# Patient Record
Sex: MALE | Race: WHITE | NOT HISPANIC OR LATINO | Employment: UNEMPLOYED | URBAN - METROPOLITAN AREA
[De-identification: names, ages, dates, MRNs, and addresses within clinical notes are randomized per-mention and may not be internally consistent; named-entity substitution may affect disease eponyms.]

---

## 2024-05-21 ENCOUNTER — APPOINTMENT (OUTPATIENT)
Dept: RADIOLOGY | Facility: MEDICAL CENTER | Age: 54
DRG: 219 | End: 2024-05-21
Attending: EMERGENCY MEDICINE
Payer: COMMERCIAL

## 2024-05-21 ENCOUNTER — APPOINTMENT (OUTPATIENT)
Dept: RADIOLOGY | Facility: MEDICAL CENTER | Age: 54
DRG: 219 | End: 2024-05-21
Attending: SURGERY
Payer: COMMERCIAL

## 2024-05-21 ENCOUNTER — HOSPITAL ENCOUNTER (INPATIENT)
Facility: MEDICAL CENTER | Age: 54
LOS: 5 days | DRG: 219 | End: 2024-05-26
Attending: EMERGENCY MEDICINE | Admitting: SURGERY
Payer: COMMERCIAL

## 2024-05-21 ENCOUNTER — ANESTHESIA EVENT (OUTPATIENT)
Dept: SURGERY | Facility: MEDICAL CENTER | Age: 54
DRG: 219 | End: 2024-05-21
Payer: COMMERCIAL

## 2024-05-21 ENCOUNTER — ANESTHESIA (OUTPATIENT)
Dept: SURGERY | Facility: MEDICAL CENTER | Age: 54
DRG: 219 | End: 2024-05-21
Payer: COMMERCIAL

## 2024-05-21 ENCOUNTER — APPOINTMENT (OUTPATIENT)
Dept: CARDIOLOGY | Facility: MEDICAL CENTER | Age: 54
DRG: 219 | End: 2024-05-21
Attending: SURGERY
Payer: COMMERCIAL

## 2024-05-21 DIAGNOSIS — E66.01 MORBID OBESITY DUE TO EXCESS CALORIES (HCC): ICD-10-CM

## 2024-05-21 DIAGNOSIS — I71.012 DISSECTION OF DESCENDING THORACIC AORTA (HCC): ICD-10-CM

## 2024-05-21 DIAGNOSIS — D50.8 IRON DEFICIENCY ANEMIA SECONDARY TO INADEQUATE DIETARY IRON INTAKE: ICD-10-CM

## 2024-05-21 DIAGNOSIS — I10 PRIMARY HYPERTENSION: ICD-10-CM

## 2024-05-21 DIAGNOSIS — E03.9 ACQUIRED HYPOTHYROIDISM: ICD-10-CM

## 2024-05-21 DIAGNOSIS — Z72.0 TOBACCO ABUSE: ICD-10-CM

## 2024-05-21 DIAGNOSIS — K59.01 SLOW TRANSIT CONSTIPATION: ICD-10-CM

## 2024-05-21 DIAGNOSIS — R12 HEART BURN: ICD-10-CM

## 2024-05-21 PROBLEM — J95.821 RESPIRATORY FAILURE FOLLOWING TRAUMA AND SURGERY (HCC): Status: ACTIVE | Noted: 2024-05-21

## 2024-05-21 PROBLEM — E87.5 HYPERKALEMIA: Status: ACTIVE | Noted: 2024-05-21

## 2024-05-21 PROBLEM — I71.00 AORTIC DISSECTION (HCC): Status: ACTIVE | Noted: 2024-05-21

## 2024-05-21 LAB
ABO + RH BLD: NORMAL
ABO GROUP BLD: NORMAL
ALBUMIN SERPL BCP-MCNC: 3.2 G/DL (ref 3.2–4.9)
ALBUMIN SERPL BCP-MCNC: 3.9 G/DL (ref 3.2–4.9)
ALBUMIN/GLOB SERPL: 1.1 G/DL
ALBUMIN/GLOB SERPL: 1.1 G/DL
ALP SERPL-CCNC: 70 U/L (ref 30–99)
ALP SERPL-CCNC: 82 U/L (ref 30–99)
ALT SERPL-CCNC: 48 U/L (ref 2–50)
ALT SERPL-CCNC: 60 U/L (ref 2–50)
ANION GAP SERPL CALC-SCNC: 11 MMOL/L (ref 7–16)
ANION GAP SERPL CALC-SCNC: 11 MMOL/L (ref 7–16)
ANION GAP SERPL CALC-SCNC: 16 MMOL/L (ref 7–16)
APTT PPP: 30.6 SEC (ref 24.7–36)
AST SERPL-CCNC: 43 U/L (ref 12–45)
AST SERPL-CCNC: 56 U/L (ref 12–45)
BASE EXCESS BLDA CALC-SCNC: -1 MMOL/L (ref -4–3)
BASE EXCESS BLDA CALC-SCNC: -2 MMOL/L (ref -4–3)
BASOPHILS # BLD AUTO: 0.8 % (ref 0–1.8)
BASOPHILS # BLD: 0.12 K/UL (ref 0–0.12)
BILIRUB SERPL-MCNC: 1.4 MG/DL (ref 0.1–1.5)
BILIRUB SERPL-MCNC: 1.4 MG/DL (ref 0.1–1.5)
BLD GP AB SCN SERPL QL: NORMAL
BODY TEMPERATURE: ABNORMAL DEGREES
BODY TEMPERATURE: ABNORMAL DEGREES
BREATHS SETTING VENT: 20
BREATHS SETTING VENT: 26
BUN SERPL-MCNC: 22 MG/DL (ref 8–22)
BUN SERPL-MCNC: 25 MG/DL (ref 8–22)
BUN SERPL-MCNC: 28 MG/DL (ref 8–22)
CALCIUM ALBUM COR SERPL-MCNC: 8.3 MG/DL (ref 8.5–10.5)
CALCIUM ALBUM COR SERPL-MCNC: 9.2 MG/DL (ref 8.5–10.5)
CALCIUM SERPL-MCNC: 7.7 MG/DL (ref 8.5–10.5)
CALCIUM SERPL-MCNC: 8.1 MG/DL (ref 8.5–10.5)
CALCIUM SERPL-MCNC: 9.1 MG/DL (ref 8.5–10.5)
CFT BLD TEG: 4.7 MIN (ref 4.6–9.1)
CFT P HPASE BLD TEG: 4.9 MIN (ref 4.3–8.3)
CHLORIDE SERPL-SCNC: 100 MMOL/L (ref 96–112)
CHLORIDE SERPL-SCNC: 101 MMOL/L (ref 96–112)
CHLORIDE SERPL-SCNC: 103 MMOL/L (ref 96–112)
CLOT ANGLE BLD TEG: 67.2 DEGREES (ref 63–78)
CO2 BLDA-SCNC: 25 MMOL/L (ref 20–33)
CO2 BLDA-SCNC: 29 MMOL/L (ref 20–33)
CO2 SERPL-SCNC: 19 MMOL/L (ref 20–33)
CO2 SERPL-SCNC: 20 MMOL/L (ref 20–33)
CO2 SERPL-SCNC: 20 MMOL/L (ref 20–33)
CREAT SERPL-MCNC: 1.16 MG/DL (ref 0.5–1.4)
CREAT SERPL-MCNC: 1.67 MG/DL (ref 0.5–1.4)
CREAT SERPL-MCNC: 1.74 MG/DL (ref 0.5–1.4)
CT.EXTRINSIC BLD ROTEM: 2.2 MIN (ref 0.8–2.1)
DELSYS IDSYS: ABNORMAL
DELSYS IDSYS: ABNORMAL
EKG IMPRESSION: NORMAL
END TIDAL CARBON DIOXIDE IECO2: 33 MMHG
END TIDAL CARBON DIOXIDE IECO2: 41 MMHG
EOSINOPHIL # BLD AUTO: 0.08 K/UL (ref 0–0.51)
EOSINOPHIL NFR BLD: 0.5 % (ref 0–6.9)
ERYTHROCYTE [DISTWIDTH] IN BLOOD BY AUTOMATED COUNT: 51.2 FL (ref 35.9–50)
ERYTHROCYTE [DISTWIDTH] IN BLOOD BY AUTOMATED COUNT: 51.7 FL (ref 35.9–50)
GFR SERPLBLD CREATININE-BSD FMLA CKD-EPI: 46 ML/MIN/1.73 M 2
GFR SERPLBLD CREATININE-BSD FMLA CKD-EPI: 49 ML/MIN/1.73 M 2
GFR SERPLBLD CREATININE-BSD FMLA CKD-EPI: 75 ML/MIN/1.73 M 2
GLOBULIN SER CALC-MCNC: 2.9 G/DL (ref 1.9–3.5)
GLOBULIN SER CALC-MCNC: 3.5 G/DL (ref 1.9–3.5)
GLUCOSE SERPL-MCNC: 108 MG/DL (ref 65–99)
GLUCOSE SERPL-MCNC: 120 MG/DL (ref 65–99)
GLUCOSE SERPL-MCNC: 130 MG/DL (ref 65–99)
HCO3 BLDA-SCNC: 24.1 MMOL/L (ref 17–25)
HCO3 BLDA-SCNC: 27.4 MMOL/L (ref 17–25)
HCT VFR BLD AUTO: 48 % (ref 42–52)
HCT VFR BLD AUTO: 53.7 % (ref 42–52)
HGB BLD-MCNC: 16.2 G/DL (ref 14–18)
HGB BLD-MCNC: 18.5 G/DL (ref 14–18)
HOROWITZ INDEX BLDA+IHG-RTO: 193 MM[HG]
HOROWITZ INDEX BLDA+IHG-RTO: 296 MM[HG]
IMM GRANULOCYTES # BLD AUTO: 0.1 K/UL (ref 0–0.11)
IMM GRANULOCYTES NFR BLD AUTO: 0.7 % (ref 0–0.9)
INR PPP: 1.18 (ref 0.87–1.13)
LIPASE SERPL-CCNC: 36 U/L (ref 11–82)
LYMPHOCYTES # BLD AUTO: 1.11 K/UL (ref 1–4.8)
LYMPHOCYTES NFR BLD: 7.3 % (ref 22–41)
MCF BLD TEG: 49.8 MM (ref 52–69)
MCF.PLATELET INHIB BLD ROTEM: 15 MM (ref 15–32)
MCH RBC QN AUTO: 33 PG (ref 27–33)
MCH RBC QN AUTO: 33.5 PG (ref 27–33)
MCHC RBC AUTO-ENTMCNC: 33.8 G/DL (ref 32.3–36.5)
MCHC RBC AUTO-ENTMCNC: 34.5 G/DL (ref 32.3–36.5)
MCV RBC AUTO: 97.3 FL (ref 81.4–97.8)
MCV RBC AUTO: 97.8 FL (ref 81.4–97.8)
MODE IMODE: ABNORMAL
MODE IMODE: ABNORMAL
MONOCYTES # BLD AUTO: 1.1 K/UL (ref 0–0.85)
MONOCYTES NFR BLD AUTO: 7.2 % (ref 0–13.4)
NEUTROPHILS # BLD AUTO: 12.74 K/UL (ref 1.82–7.42)
NEUTROPHILS NFR BLD: 83.5 % (ref 44–72)
NRBC # BLD AUTO: 0 K/UL
NRBC BLD-RTO: 0 /100 WBC (ref 0–0.2)
O2/TOTAL GAS SETTING VFR VENT: 100 %
O2/TOTAL GAS SETTING VFR VENT: 40 %
PA AA BLD-ACNC: 72.7 % (ref 0–11)
PA ADP BLD-ACNC: 37.1 % (ref 0–17)
PCO2 BLDA: 41.3 MMHG (ref 26–37)
PCO2 BLDA: 66.3 MMHG (ref 26–37)
PCO2 TEMP ADJ BLDA: 40.7 MMHG (ref 26–37)
PCO2 TEMP ADJ BLDA: 61.3 MMHG (ref 26–37)
PEEP END EXPIRATORY PRESSURE IPEEP: 8 CMH20
PEEP END EXPIRATORY PRESSURE IPEEP: 8 CMH20
PH BLDA: 7.22 [PH] (ref 7.4–7.5)
PH BLDA: 7.38 [PH] (ref 7.4–7.5)
PH TEMP ADJ BLDA: 7.25 [PH] (ref 7.4–7.5)
PH TEMP ADJ BLDA: 7.38 [PH] (ref 7.4–7.5)
PLATELET # BLD AUTO: 143 K/UL (ref 164–446)
PLATELET # BLD AUTO: 170 K/UL (ref 164–446)
PMV BLD AUTO: 10 FL (ref 9–12.9)
PMV BLD AUTO: 10.4 FL (ref 9–12.9)
PO2 BLDA: 296 MMHG (ref 64–87)
PO2 BLDA: 77 MMHG (ref 64–87)
PO2 TEMP ADJ BLDA: 287 MMHG (ref 64–87)
PO2 TEMP ADJ BLDA: 75 MMHG (ref 64–87)
POTASSIUM SERPL-SCNC: 4.9 MMOL/L (ref 3.6–5.5)
POTASSIUM SERPL-SCNC: 4.9 MMOL/L (ref 3.6–5.5)
POTASSIUM SERPL-SCNC: 6.3 MMOL/L (ref 3.6–5.5)
PROT SERPL-MCNC: 6.1 G/DL (ref 6–8.2)
PROT SERPL-MCNC: 7.4 G/DL (ref 6–8.2)
PROTHROMBIN TIME: 15.1 SEC (ref 12–14.6)
RBC # BLD AUTO: 4.91 M/UL (ref 4.7–6.1)
RBC # BLD AUTO: 5.52 M/UL (ref 4.7–6.1)
RH BLD: NORMAL
SAO2 % BLDA: 100 % (ref 93–99)
SAO2 % BLDA: 95 % (ref 93–99)
SODIUM SERPL-SCNC: 132 MMOL/L (ref 135–145)
SODIUM SERPL-SCNC: 133 MMOL/L (ref 135–145)
SODIUM SERPL-SCNC: 136 MMOL/L (ref 135–145)
SPECIMEN DRAWN FROM PATIENT: ABNORMAL
SPECIMEN DRAWN FROM PATIENT: ABNORMAL
TEG ALGORITHM TGALG: ABNORMAL
TIDAL VOLUME IVT: 440 ML
TIDAL VOLUME IVT: 440 ML
TRIGL SERPL-MCNC: 73 MG/DL (ref 0–149)
TROPONIN T SERPL-MCNC: 18 NG/L (ref 6–19)
WBC # BLD AUTO: 11.1 K/UL (ref 4.8–10.8)
WBC # BLD AUTO: 15.3 K/UL (ref 4.8–10.8)

## 2024-05-21 PROCEDURE — 700105 HCHG RX REV CODE 258: Performed by: SURGERY

## 2024-05-21 PROCEDURE — 770022 HCHG ROOM/CARE - ICU (200)

## 2024-05-21 PROCEDURE — 94799 UNLISTED PULMONARY SVC/PX: CPT

## 2024-05-21 PROCEDURE — 94003 VENT MGMT INPAT SUBQ DAY: CPT

## 2024-05-21 PROCEDURE — 35661 BPG FEMORAL-FEMORAL: CPT | Mod: 50 | Performed by: SURGERY

## 2024-05-21 PROCEDURE — C1889 IMPLANT/INSERT DEVICE, NOC: HCPCS | Performed by: SURGERY

## 2024-05-21 PROCEDURE — 700111 HCHG RX REV CODE 636 W/ 250 OVERRIDE (IP): Performed by: SURGERY

## 2024-05-21 PROCEDURE — 85027 COMPLETE CBC AUTOMATED: CPT

## 2024-05-21 PROCEDURE — 75630 X-RAY AORTA LEG ARTERIES: CPT | Mod: 26,AS,59 | Performed by: NURSE PRACTITIONER

## 2024-05-21 PROCEDURE — 110454 HCHG SHELL REV 250: Performed by: SURGERY

## 2024-05-21 PROCEDURE — 33880 EVASC RPR TA NDGFT COV LSA: CPT | Performed by: SURGERY

## 2024-05-21 PROCEDURE — 02VW3DZ RESTRICTION OF THORACIC AORTA, DESCENDING WITH INTRALUMINAL DEVICE, PERCUTANEOUS APPROACH: ICD-10-PCS | Performed by: SURGERY

## 2024-05-21 PROCEDURE — C1725 CATH, TRANSLUMIN NON-LASER: HCPCS | Performed by: SURGERY

## 2024-05-21 PROCEDURE — C1894 INTRO/SHEATH, NON-LASER: HCPCS | Performed by: SURGERY

## 2024-05-21 PROCEDURE — 85384 FIBRINOGEN ACTIVITY: CPT | Mod: 91

## 2024-05-21 PROCEDURE — 85025 COMPLETE CBC W/AUTO DIFF WBC: CPT

## 2024-05-21 PROCEDURE — 73701 CT LOWER EXTREMITY W/DYE: CPT

## 2024-05-21 PROCEDURE — 160031 HCHG SURGERY MINUTES - 1ST 30 MINS LEVEL 5: Performed by: SURGERY

## 2024-05-21 PROCEDURE — 85576 BLOOD PLATELET AGGREGATION: CPT | Mod: 91

## 2024-05-21 PROCEDURE — 75630 X-RAY AORTA LEG ARTERIES: CPT | Mod: 26,59 | Performed by: SURGERY

## 2024-05-21 PROCEDURE — 700101 HCHG RX REV CODE 250: Performed by: EMERGENCY MEDICINE

## 2024-05-21 PROCEDURE — 85347 COAGULATION TIME ACTIVATED: CPT

## 2024-05-21 PROCEDURE — 700101 HCHG RX REV CODE 250: Performed by: SURGERY

## 2024-05-21 PROCEDURE — 80053 COMPREHEN METABOLIC PANEL: CPT

## 2024-05-21 PROCEDURE — 37221 PR REVASCULARIZE ILIAC ARTERY,ANGIOPLASTY/ST*: CPT | Mod: AS,50 | Performed by: NURSE PRACTITIONER

## 2024-05-21 PROCEDURE — 96375 TX/PRO/DX INJ NEW DRUG ADDON: CPT

## 2024-05-21 PROCEDURE — 85730 THROMBOPLASTIN TIME PARTIAL: CPT

## 2024-05-21 PROCEDURE — 96366 THER/PROPH/DIAG IV INF ADDON: CPT

## 2024-05-21 PROCEDURE — C1769 GUIDE WIRE: HCPCS | Performed by: SURGERY

## 2024-05-21 PROCEDURE — 86900 BLOOD TYPING SEROLOGIC ABO: CPT

## 2024-05-21 PROCEDURE — C1768 GRAFT, VASCULAR: HCPCS | Performed by: SURGERY

## 2024-05-21 PROCEDURE — 99222 1ST HOSP IP/OBS MODERATE 55: CPT | Mod: 57 | Performed by: SURGERY

## 2024-05-21 PROCEDURE — 99291 CRITICAL CARE FIRST HOUR: CPT | Performed by: SURGERY

## 2024-05-21 PROCEDURE — 160048 HCHG OR STATISTICAL LEVEL 1-5: Performed by: SURGERY

## 2024-05-21 PROCEDURE — B24BZZ4 ULTRASONOGRAPHY OF HEART WITH AORTA, TRANSESOPHAGEAL: ICD-10-PCS | Performed by: SURGERY

## 2024-05-21 PROCEDURE — 94002 VENT MGMT INPAT INIT DAY: CPT

## 2024-05-21 PROCEDURE — 160042 HCHG SURGERY MINUTES - EA ADDL 1 MIN LEVEL 5: Performed by: SURGERY

## 2024-05-21 PROCEDURE — 93005 ELECTROCARDIOGRAM TRACING: CPT | Performed by: EMERGENCY MEDICINE

## 2024-05-21 PROCEDURE — 82803 BLOOD GASES ANY COMBINATION: CPT

## 2024-05-21 PROCEDURE — 041L0JH BYPASS LEFT FEMORAL ARTERY TO RIGHT FEMORAL ARTERY WITH SYNTHETIC SUBSTITUTE, OPEN APPROACH: ICD-10-PCS | Performed by: SURGERY

## 2024-05-21 PROCEDURE — 36415 COLL VENOUS BLD VENIPUNCTURE: CPT

## 2024-05-21 PROCEDURE — 85610 PROTHROMBIN TIME: CPT

## 2024-05-21 PROCEDURE — 84478 ASSAY OF TRIGLYCERIDES: CPT

## 2024-05-21 PROCEDURE — 700105 HCHG RX REV CODE 258: Performed by: EMERGENCY MEDICINE

## 2024-05-21 PROCEDURE — 71275 CT ANGIOGRAPHY CHEST: CPT

## 2024-05-21 PROCEDURE — 80048 BASIC METABOLIC PNL TOTAL CA: CPT

## 2024-05-21 PROCEDURE — C1876 STENT, NON-COA/NON-COV W/DEL: HCPCS | Performed by: SURGERY

## 2024-05-21 PROCEDURE — 37221 PR REVASCULARIZE ILIAC ARTERY,ANGIOPLASTY/ST*: CPT | Mod: 50 | Performed by: SURGERY

## 2024-05-21 PROCEDURE — C1751 CATH, INF, PER/CENT/MIDLINE: HCPCS | Performed by: SURGERY

## 2024-05-21 PROCEDURE — 83690 ASSAY OF LIPASE: CPT

## 2024-05-21 PROCEDURE — 160009 HCHG ANES TIME/MIN: Performed by: SURGERY

## 2024-05-21 PROCEDURE — 75635 CT ANGIO ABDOMINAL ARTERIES: CPT

## 2024-05-21 PROCEDURE — 99291 CRITICAL CARE FIRST HOUR: CPT

## 2024-05-21 PROCEDURE — 700117 HCHG RX CONTRAST REV CODE 255: Performed by: EMERGENCY MEDICINE

## 2024-05-21 PROCEDURE — A9270 NON-COVERED ITEM OR SERVICE: HCPCS | Performed by: SURGERY

## 2024-05-21 PROCEDURE — 700101 HCHG RX REV CODE 250: Performed by: NURSE PRACTITIONER

## 2024-05-21 PROCEDURE — 35661 BPG FEMORAL-FEMORAL: CPT | Mod: AS,50 | Performed by: NURSE PRACTITIONER

## 2024-05-21 PROCEDURE — C1887 CATHETER, GUIDING: HCPCS | Performed by: SURGERY

## 2024-05-21 PROCEDURE — 96376 TX/PRO/DX INJ SAME DRUG ADON: CPT

## 2024-05-21 PROCEDURE — 37799 UNLISTED PX VASCULAR SURGERY: CPT

## 2024-05-21 PROCEDURE — 33880 EVASC RPR TA NDGFT COV LSA: CPT | Mod: AS | Performed by: NURSE PRACTITIONER

## 2024-05-21 PROCEDURE — 047H3DZ DILATION OF RIGHT EXTERNAL ILIAC ARTERY WITH INTRALUMINAL DEVICE, PERCUTANEOUS APPROACH: ICD-10-PCS | Performed by: SURGERY

## 2024-05-21 PROCEDURE — 84484 ASSAY OF TROPONIN QUANT: CPT

## 2024-05-21 PROCEDURE — 700111 HCHG RX REV CODE 636 W/ 250 OVERRIDE (IP): Performed by: EMERGENCY MEDICINE

## 2024-05-21 PROCEDURE — 93325 DOPPLER ECHO COLOR FLOW MAPG: CPT

## 2024-05-21 PROCEDURE — 700117 HCHG RX CONTRAST REV CODE 255: Performed by: SURGERY

## 2024-05-21 PROCEDURE — 96365 THER/PROPH/DIAG IV INF INIT: CPT

## 2024-05-21 PROCEDURE — 86850 RBC ANTIBODY SCREEN: CPT

## 2024-05-21 PROCEDURE — 700102 HCHG RX REV CODE 250 W/ 637 OVERRIDE(OP): Performed by: SURGERY

## 2024-05-21 PROCEDURE — 71045 X-RAY EXAM CHEST 1 VIEW: CPT

## 2024-05-21 PROCEDURE — 047D3DZ DILATION OF LEFT COMMON ILIAC ARTERY WITH INTRALUMINAL DEVICE, PERCUTANEOUS APPROACH: ICD-10-PCS | Performed by: SURGERY

## 2024-05-21 PROCEDURE — 304538 HCHG NG TUBE

## 2024-05-21 PROCEDURE — 94640 AIRWAY INHALATION TREATMENT: CPT

## 2024-05-21 PROCEDURE — 86901 BLOOD TYPING SEROLOGIC RH(D): CPT

## 2024-05-21 DEVICE — IMPLANTABLE DEVICE: Type: IMPLANTABLE DEVICE | Site: GROIN | Status: FUNCTIONAL

## 2024-05-21 DEVICE — GRAFT HEMA BIFUR MDV 14X7: Type: IMPLANTABLE DEVICE | Site: GROIN | Status: FUNCTIONAL

## 2024-05-21 RX ORDER — SODIUM CHLORIDE, SODIUM GLUCONATE, SODIUM ACETATE, POTASSIUM CHLORIDE AND MAGNESIUM CHLORIDE 526; 502; 368; 37; 30 MG/100ML; MG/100ML; MG/100ML; MG/100ML; MG/100ML
INJECTION, SOLUTION INTRAVENOUS
Status: DISCONTINUED | OUTPATIENT
Start: 2024-05-21 | End: 2024-05-21 | Stop reason: SURG

## 2024-05-21 RX ORDER — OXYCODONE HCL 5 MG/5 ML
10 SOLUTION, ORAL ORAL
Status: DISCONTINUED | OUTPATIENT
Start: 2024-05-21 | End: 2024-05-21

## 2024-05-21 RX ORDER — OXYCODONE HYDROCHLORIDE 5 MG/1
5 TABLET ORAL
Status: DISCONTINUED | OUTPATIENT
Start: 2024-05-21 | End: 2024-05-25

## 2024-05-21 RX ORDER — HYDRALAZINE HYDROCHLORIDE 20 MG/ML
10 INJECTION INTRAMUSCULAR; INTRAVENOUS EVERY 6 HOURS PRN
Status: DISCONTINUED | OUTPATIENT
Start: 2024-05-21 | End: 2024-05-23

## 2024-05-21 RX ORDER — SCOLOPAMINE TRANSDERMAL SYSTEM 1 MG/1
1 PATCH, EXTENDED RELEASE TRANSDERMAL
Status: DISCONTINUED | OUTPATIENT
Start: 2024-05-21 | End: 2024-05-24

## 2024-05-21 RX ORDER — HYDRALAZINE HYDROCHLORIDE 20 MG/ML
10 INJECTION INTRAMUSCULAR; INTRAVENOUS
Status: DISCONTINUED | OUTPATIENT
Start: 2024-05-21 | End: 2024-05-21

## 2024-05-21 RX ORDER — ONDANSETRON 2 MG/ML
4 INJECTION INTRAMUSCULAR; INTRAVENOUS
Status: DISCONTINUED | OUTPATIENT
Start: 2024-05-21 | End: 2024-05-21

## 2024-05-21 RX ORDER — DEXAMETHASONE SODIUM PHOSPHATE 4 MG/ML
4 INJECTION, SOLUTION INTRA-ARTICULAR; INTRALESIONAL; INTRAMUSCULAR; INTRAVENOUS; SOFT TISSUE
Status: DISCONTINUED | OUTPATIENT
Start: 2024-05-21 | End: 2024-05-24

## 2024-05-21 RX ORDER — ONDANSETRON 2 MG/ML
4 INJECTION INTRAMUSCULAR; INTRAVENOUS EVERY 4 HOURS PRN
Status: DISCONTINUED | OUTPATIENT
Start: 2024-05-21 | End: 2024-05-26 | Stop reason: HOSPADM

## 2024-05-21 RX ORDER — LABETALOL HYDROCHLORIDE 5 MG/ML
10 INJECTION, SOLUTION INTRAVENOUS EVERY 6 HOURS PRN
Status: DISCONTINUED | OUTPATIENT
Start: 2024-05-21 | End: 2024-05-22

## 2024-05-21 RX ORDER — HALOPERIDOL 5 MG/ML
1 INJECTION INTRAMUSCULAR EVERY 6 HOURS PRN
Status: DISCONTINUED | OUTPATIENT
Start: 2024-05-21 | End: 2024-05-24

## 2024-05-21 RX ORDER — OXYCODONE HCL 5 MG/5 ML
5 SOLUTION, ORAL ORAL
Status: DISCONTINUED | OUTPATIENT
Start: 2024-05-21 | End: 2024-05-21

## 2024-05-21 RX ORDER — HYDRALAZINE HYDROCHLORIDE 20 MG/ML
20 INJECTION INTRAMUSCULAR; INTRAVENOUS EVERY 6 HOURS PRN
Status: DISCONTINUED | OUTPATIENT
Start: 2024-05-21 | End: 2024-05-21

## 2024-05-21 RX ORDER — OXYCODONE HYDROCHLORIDE 5 MG/1
5 TABLET ORAL
Status: DISCONTINUED | OUTPATIENT
Start: 2024-05-21 | End: 2024-05-21

## 2024-05-21 RX ORDER — FAMOTIDINE 20 MG/1
20 TABLET, FILM COATED ORAL 2 TIMES DAILY
Status: DISCONTINUED | OUTPATIENT
Start: 2024-05-21 | End: 2024-05-22

## 2024-05-21 RX ORDER — DEXTROSE MONOHYDRATE, SODIUM CHLORIDE, AND POTASSIUM CHLORIDE 50; 1.49; 4.5 G/1000ML; G/1000ML; G/1000ML
INJECTION, SOLUTION INTRAVENOUS CONTINUOUS
Status: DISPENSED | OUTPATIENT
Start: 2024-05-21 | End: 2024-05-21

## 2024-05-21 RX ORDER — CEFAZOLIN SODIUM 1 G/3ML
INJECTION, POWDER, FOR SOLUTION INTRAMUSCULAR; INTRAVENOUS PRN
Status: DISCONTINUED | OUTPATIENT
Start: 2024-05-21 | End: 2024-05-21 | Stop reason: SURG

## 2024-05-21 RX ORDER — HYDROMORPHONE HYDROCHLORIDE 1 MG/ML
0.1 INJECTION, SOLUTION INTRAMUSCULAR; INTRAVENOUS; SUBCUTANEOUS
Status: DISCONTINUED | OUTPATIENT
Start: 2024-05-21 | End: 2024-05-21

## 2024-05-21 RX ORDER — ESMOLOL HYDROCHLORIDE 20 MG/ML
0-200 INJECTION, SOLUTION INTRAVENOUS CONTINUOUS
Status: DISCONTINUED | OUTPATIENT
Start: 2024-05-21 | End: 2024-05-21

## 2024-05-21 RX ORDER — AMLODIPINE BESYLATE 10 MG/1
10 TABLET ORAL
Status: DISCONTINUED | OUTPATIENT
Start: 2024-05-21 | End: 2024-05-25

## 2024-05-21 RX ORDER — PROTAMINE SULFATE 10 MG/ML
INJECTION, SOLUTION INTRAVENOUS PRN
Status: DISCONTINUED | OUTPATIENT
Start: 2024-05-21 | End: 2024-05-21 | Stop reason: SURG

## 2024-05-21 RX ORDER — NICOTINE 21 MG/24HR
21 PATCH, TRANSDERMAL 24 HOURS TRANSDERMAL
Status: DISCONTINUED | OUTPATIENT
Start: 2024-05-21 | End: 2024-05-26 | Stop reason: HOSPADM

## 2024-05-21 RX ORDER — DEXTROSE MONOHYDRATE 25 G/50ML
25 INJECTION, SOLUTION INTRAVENOUS ONCE
Status: COMPLETED | OUTPATIENT
Start: 2024-05-21 | End: 2024-05-21

## 2024-05-21 RX ORDER — SODIUM CHLORIDE, SODIUM LACTATE, POTASSIUM CHLORIDE, CALCIUM CHLORIDE 600; 310; 30; 20 MG/100ML; MG/100ML; MG/100ML; MG/100ML
INJECTION, SOLUTION INTRAVENOUS CONTINUOUS
Status: DISCONTINUED | OUTPATIENT
Start: 2024-05-21 | End: 2024-05-21

## 2024-05-21 RX ORDER — HYDROMORPHONE HYDROCHLORIDE 1 MG/ML
1 INJECTION, SOLUTION INTRAMUSCULAR; INTRAVENOUS; SUBCUTANEOUS
Status: DISCONTINUED | OUTPATIENT
Start: 2024-05-21 | End: 2024-05-24

## 2024-05-21 RX ORDER — HYDROMORPHONE HYDROCHLORIDE 1 MG/ML
0.4 INJECTION, SOLUTION INTRAMUSCULAR; INTRAVENOUS; SUBCUTANEOUS
Status: DISCONTINUED | OUTPATIENT
Start: 2024-05-21 | End: 2024-05-21

## 2024-05-21 RX ORDER — HYDROMORPHONE HYDROCHLORIDE 1 MG/ML
0.5 INJECTION, SOLUTION INTRAMUSCULAR; INTRAVENOUS; SUBCUTANEOUS
Status: DISCONTINUED | OUTPATIENT
Start: 2024-05-21 | End: 2024-05-21

## 2024-05-21 RX ORDER — CALCIUM CHLORIDE 100 MG/ML
1 INJECTION INTRAVENOUS; INTRAVENTRICULAR ONCE
Status: COMPLETED | OUTPATIENT
Start: 2024-05-21 | End: 2024-05-21

## 2024-05-21 RX ORDER — HYDROMORPHONE HYDROCHLORIDE 1 MG/ML
1 INJECTION, SOLUTION INTRAMUSCULAR; INTRAVENOUS; SUBCUTANEOUS
Status: DISCONTINUED | OUTPATIENT
Start: 2024-05-21 | End: 2024-05-21

## 2024-05-21 RX ORDER — MEPERIDINE HYDROCHLORIDE 25 MG/ML
6.25 INJECTION INTRAMUSCULAR; INTRAVENOUS; SUBCUTANEOUS
Status: DISCONTINUED | OUTPATIENT
Start: 2024-05-21 | End: 2024-05-21

## 2024-05-21 RX ORDER — DIPHENHYDRAMINE HYDROCHLORIDE 50 MG/ML
25 INJECTION INTRAMUSCULAR; INTRAVENOUS EVERY 6 HOURS PRN
Status: DISCONTINUED | OUTPATIENT
Start: 2024-05-21 | End: 2024-05-26 | Stop reason: HOSPADM

## 2024-05-21 RX ORDER — DIPHENHYDRAMINE HYDROCHLORIDE 50 MG/ML
12.5 INJECTION INTRAMUSCULAR; INTRAVENOUS
Status: DISCONTINUED | OUTPATIENT
Start: 2024-05-21 | End: 2024-05-21

## 2024-05-21 RX ORDER — IODIXANOL 270 MG/ML
INJECTION, SOLUTION INTRAVASCULAR
Status: DISCONTINUED | OUTPATIENT
Start: 2024-05-21 | End: 2024-05-21 | Stop reason: HOSPADM

## 2024-05-21 RX ORDER — LABETALOL HYDROCHLORIDE 5 MG/ML
10 INJECTION, SOLUTION INTRAVENOUS ONCE
Status: COMPLETED | OUTPATIENT
Start: 2024-05-21 | End: 2024-05-21

## 2024-05-21 RX ORDER — HEPARIN SODIUM,PORCINE 1000/ML
VIAL (ML) INJECTION PRN
Status: DISCONTINUED | OUTPATIENT
Start: 2024-05-21 | End: 2024-05-21 | Stop reason: SURG

## 2024-05-21 RX ORDER — OXYCODONE HYDROCHLORIDE 10 MG/1
10 TABLET ORAL
Status: DISCONTINUED | OUTPATIENT
Start: 2024-05-21 | End: 2024-05-21

## 2024-05-21 RX ORDER — ESMOLOL HYDROCHLORIDE 20 MG/ML
0-300 INJECTION, SOLUTION INTRAVENOUS CONTINUOUS
Status: DISCONTINUED | OUTPATIENT
Start: 2024-05-21 | End: 2024-05-24

## 2024-05-21 RX ORDER — HALOPERIDOL 5 MG/ML
1 INJECTION INTRAMUSCULAR
Status: DISCONTINUED | OUTPATIENT
Start: 2024-05-21 | End: 2024-05-21

## 2024-05-21 RX ORDER — HYDROMORPHONE HYDROCHLORIDE 1 MG/ML
0.5 INJECTION, SOLUTION INTRAMUSCULAR; INTRAVENOUS; SUBCUTANEOUS
Status: DISCONTINUED | OUTPATIENT
Start: 2024-05-21 | End: 2024-05-24

## 2024-05-21 RX ORDER — SODIUM CHLORIDE 9 MG/ML
INJECTION, SOLUTION INTRAVENOUS CONTINUOUS
Status: DISCONTINUED | OUTPATIENT
Start: 2024-05-21 | End: 2024-05-23

## 2024-05-21 RX ORDER — LABETALOL HYDROCHLORIDE 5 MG/ML
10 INJECTION, SOLUTION INTRAVENOUS
Status: DISCONTINUED | OUTPATIENT
Start: 2024-05-21 | End: 2024-05-21

## 2024-05-21 RX ORDER — OXYCODONE HYDROCHLORIDE 10 MG/1
10 TABLET ORAL
Status: DISCONTINUED | OUTPATIENT
Start: 2024-05-21 | End: 2024-05-25

## 2024-05-21 RX ORDER — HYDROMORPHONE HYDROCHLORIDE 1 MG/ML
1 INJECTION, SOLUTION INTRAMUSCULAR; INTRAVENOUS; SUBCUTANEOUS ONCE
Status: COMPLETED | OUTPATIENT
Start: 2024-05-21 | End: 2024-05-21

## 2024-05-21 RX ORDER — CARVEDILOL 6.25 MG/1
6.25 TABLET ORAL 2 TIMES DAILY WITH MEALS
Status: DISCONTINUED | OUTPATIENT
Start: 2024-05-21 | End: 2024-05-22

## 2024-05-21 RX ORDER — HYDROMORPHONE HYDROCHLORIDE 1 MG/ML
0.2 INJECTION, SOLUTION INTRAMUSCULAR; INTRAVENOUS; SUBCUTANEOUS
Status: DISCONTINUED | OUTPATIENT
Start: 2024-05-21 | End: 2024-05-21

## 2024-05-21 RX ADMIN — HYDROMORPHONE HYDROCHLORIDE 1 MG: 1 INJECTION, SOLUTION INTRAMUSCULAR; INTRAVENOUS; SUBCUTANEOUS at 17:16

## 2024-05-21 RX ADMIN — SODIUM CHLORIDE 5 MG/HR: 9 INJECTION, SOLUTION INTRAVENOUS at 09:31

## 2024-05-21 RX ADMIN — ROCURONIUM BROMIDE 100 MG: 10 INJECTION, SOLUTION INTRAVENOUS at 11:44

## 2024-05-21 RX ADMIN — NICOTINE TRANSDERMAL SYSTEM 21 MG: 21 PATCH, EXTENDED RELEASE TRANSDERMAL at 16:45

## 2024-05-21 RX ADMIN — PROPOFOL 200 MG: 10 INJECTION, EMULSION INTRAVENOUS at 11:44

## 2024-05-21 RX ADMIN — ESMOLOL HYDROCHLORIDE IN SODIUM CHLORIDE 100 MCG/KG/MIN: 20 INJECTION INTRAVENOUS at 22:26

## 2024-05-21 RX ADMIN — PROTAMINE SULFATE 30 MG: 10 INJECTION, SOLUTION INTRAVENOUS at 13:43

## 2024-05-21 RX ADMIN — HYDROMORPHONE HYDROCHLORIDE 1 MG: 1 INJECTION, SOLUTION INTRAMUSCULAR; INTRAVENOUS; SUBCUTANEOUS at 09:20

## 2024-05-21 RX ADMIN — CARVEDILOL 6.25 MG: 6.25 TABLET, FILM COATED ORAL at 16:45

## 2024-05-21 RX ADMIN — CEFAZOLIN 2 G: 1 INJECTION, POWDER, FOR SOLUTION INTRAMUSCULAR; INTRAVENOUS at 11:59

## 2024-05-21 RX ADMIN — FENTANYL CITRATE 100 MCG: 50 INJECTION, SOLUTION INTRAMUSCULAR; INTRAVENOUS at 11:44

## 2024-05-21 RX ADMIN — SODIUM BICARBONATE 100 MEQ: 84 INJECTION INTRAVENOUS at 17:06

## 2024-05-21 RX ADMIN — ESMOLOL HYDROCHLORIDE IN SODIUM CHLORIDE 50 MCG/KG/MIN: 20 INJECTION INTRAVENOUS at 14:27

## 2024-05-21 RX ADMIN — ROCURONIUM BROMIDE 10 MG: 10 INJECTION, SOLUTION INTRAVENOUS at 13:38

## 2024-05-21 RX ADMIN — ALBUTEROL SULFATE 2.5 MG: 2.5 SOLUTION RESPIRATORY (INHALATION) at 16:38

## 2024-05-21 RX ADMIN — DEXTROSE MONOHYDRATE 25 G: 25 INJECTION, SOLUTION INTRAVENOUS at 17:06

## 2024-05-21 RX ADMIN — IOHEXOL 145 ML: 350 INJECTION, SOLUTION INTRAVENOUS at 09:45

## 2024-05-21 RX ADMIN — HYDROMORPHONE HYDROCHLORIDE 1 MG: 1 INJECTION, SOLUTION INTRAMUSCULAR; INTRAVENOUS; SUBCUTANEOUS at 19:14

## 2024-05-21 RX ADMIN — CALCIUM CHLORIDE 1 G: 100 INJECTION, SOLUTION INTRAVENOUS at 17:19

## 2024-05-21 RX ADMIN — SODIUM CHLORIDE: 9 INJECTION, SOLUTION INTRAVENOUS at 17:06

## 2024-05-21 RX ADMIN — AMLODIPINE BESYLATE 10 MG: 10 TABLET ORAL at 16:45

## 2024-05-21 RX ADMIN — ESMOLOL HYDROCHLORIDE IN SODIUM CHLORIDE 50 MCG/KG/MIN: 20 INJECTION INTRAVENOUS at 09:08

## 2024-05-21 RX ADMIN — PROPOFOL 80 MCG/KG/MIN: 10 INJECTION, EMULSION INTRAVENOUS at 21:07

## 2024-05-21 RX ADMIN — SODIUM CHLORIDE 5 MG/HR: 9 INJECTION, SOLUTION INTRAVENOUS at 18:51

## 2024-05-21 RX ADMIN — ESMOLOL HYDROCHLORIDE IN SODIUM CHLORIDE 100 MCG/KG/MIN: 20 INJECTION INTRAVENOUS at 19:57

## 2024-05-21 RX ADMIN — LABETALOL HYDROCHLORIDE 10 MG: 5 INJECTION, SOLUTION INTRAVENOUS at 08:51

## 2024-05-21 RX ADMIN — HYDROMORPHONE HYDROCHLORIDE 1 MG: 1 INJECTION, SOLUTION INTRAMUSCULAR; INTRAVENOUS; SUBCUTANEOUS at 10:27

## 2024-05-21 RX ADMIN — PROPOFOL 60 MCG/KG/MIN: 10 INJECTION, EMULSION INTRAVENOUS at 14:30

## 2024-05-21 RX ADMIN — ALBUTEROL SULFATE 2.5 MG: 2.5 SOLUTION RESPIRATORY (INHALATION) at 18:53

## 2024-05-21 RX ADMIN — ESMOLOL HYDROCHLORIDE IN SODIUM CHLORIDE 300 MCG/KG/MIN: 20 INJECTION INTRAVENOUS at 10:10

## 2024-05-21 RX ADMIN — INSULIN HUMAN 10 UNITS: 100 INJECTION, SOLUTION PARENTERAL at 17:08

## 2024-05-21 RX ADMIN — HEPARIN SODIUM 10000 UNITS: 1000 INJECTION, SOLUTION INTRAVENOUS; SUBCUTANEOUS at 12:23

## 2024-05-21 RX ADMIN — SODIUM CHLORIDE, SODIUM GLUCONATE, SODIUM ACETATE, POTASSIUM CHLORIDE AND MAGNESIUM CHLORIDE: 526; 502; 368; 37; 30 INJECTION, SOLUTION INTRAVENOUS at 11:39

## 2024-05-21 RX ADMIN — FAMOTIDINE 20 MG: 10 INJECTION, SOLUTION INTRAVENOUS at 17:06

## 2024-05-21 RX ADMIN — HYDRALAZINE HYDROCHLORIDE 10 MG: 20 INJECTION, SOLUTION INTRAMUSCULAR; INTRAVENOUS at 17:36

## 2024-05-21 RX ADMIN — PROPOFOL 30 MCG/KG/MIN: 10 INJECTION, EMULSION INTRAVENOUS at 14:00

## 2024-05-21 RX ADMIN — ALBUTEROL SULFATE 2.5 MG: 2.5 SOLUTION RESPIRATORY (INHALATION) at 21:53

## 2024-05-21 RX ADMIN — DEXTROSE MONOHYDRATE, SODIUM CHLORIDE, AND POTASSIUM CHLORIDE: 50; 4.5; 1.49 INJECTION, SOLUTION INTRAVENOUS at 15:00

## 2024-05-21 RX ADMIN — PROPOFOL 80 MCG/KG/MIN: 10 INJECTION, EMULSION INTRAVENOUS at 23:51

## 2024-05-21 RX ADMIN — PROPOFOL 80 MCG/KG/MIN: 10 INJECTION, EMULSION INTRAVENOUS at 18:29

## 2024-05-21 RX ADMIN — PROPOFOL 70 MCG/KG/MIN: 10 INJECTION, EMULSION INTRAVENOUS at 14:56

## 2024-05-21 ASSESSMENT — PAIN DESCRIPTION - PAIN TYPE: TYPE: ACUTE PAIN

## 2024-05-21 ASSESSMENT — FIBROSIS 4 INDEX: FIB4 SCORE: 2.21

## 2024-05-21 NOTE — ANESTHESIA TIME REPORT
Anesthesia Start and Stop Event Times       Date Time Event    5/21/2024 1139 Anesthesia Start     1143 Ready for Procedure     1405 Anesthesia Stop          Responsible Staff  05/21/24      Name Role Begin End    Elias Rodriguez M.D. Anesth 1139 1405          Overtime Reason:  no overtime (within assigned shift)    Comments:

## 2024-05-21 NOTE — PROGRESS NOTES
"    INTERVAL EVENTS AND INTERVENTIONS: Patient is a 53-year-old man with a type B aortic dissection.  He was taken to the operating room with the vascular service for an aortic stent graft and a femorofemoral bypass.  He is now reestablished circulation down to his legs.  Patient is obviously an aggressive cigarette smoker.  He is also morbidly obese.  He returns from the operating room intubated on the ventilator.  He is little hypercarbic and we are making appropriate adjustments.  Labs are pending.  He is on 2 drips for hypertension.  We are initiating oral antihypertensives and weaning these as possible.  Goal is to keep systolic blood pressure less than 130.    The patient is critically ill with acute respiratory failure and aortic dissection .  The patient was seen and examined on rounds and discussed with the multidisciplinary critical care team and consulting physicians. Critically evaluated laboratory tests, culture data, medications, imaging, and other diagnostic tests.    The patient has acute impairment of one or more vital organ systems and a high probability of imminent or life-threatening deterioration in condition. Provided high complexity decision making to assess, manipulate, and support vital system functions to treat vital organ system failure and/or to prevent further life-threatening deterioration of the patient's condition. Requires continued ICU management and hospital admission.    Critical care interventions include: integration of multiple data points and associated complex medical decision making, active ventilator management, management of critical electrolyte abnormalities, and management of thrombotic surveillance and risk mitigation.    PHYSICAL EXAMINATION:      Vital Signs: /66   Pulse (!) 54   Temp 36.7 °C (98 °F) (Temporal)   Resp (!) 23   Ht 1.778 m (5' 10\")   Wt (!) 148 kg (325 lb 13.4 oz)   SpO2 98%   Physical Exam  Vitals and nursing note reviewed. Exam conducted " with a chaperone present.   Constitutional:       Appearance: He is obese.   HENT:      Head: Normocephalic and atraumatic.      Right Ear: External ear normal.      Left Ear: External ear normal.      Mouth/Throat:      Comments: Endotracheal tube in place  Eyes:      Pupils: Pupils are equal, round, and reactive to light.   Cardiovascular:      Rate and Rhythm: Regular rhythm. Bradycardia present.      Comments: Pulses can be dopplered in feet  Pulmonary:      Effort: Pulmonary effort is normal.      Comments: Supported on the ventilator  Abdominal:      Palpations: Abdomen is soft.   Genitourinary:     Comments: Coker to gravity drainage  Musculoskeletal:         General: No deformity or signs of injury.   Skin:     General: Skin is dry.      Capillary Refill: Capillary refill takes 2 to 3 seconds.   Neurological:      General: No focal deficit present.       LABORATORY VALUES AND IMAGING REVIEWED  Hemoglobin 18.5 upon arrival.  Initial potassium on arrival was 4.9.  Additional labs are pending.      ASSESSMENT AND PLAN:   Status post aortic stent graft and fem-fem bypass.  Continue ventilator support postoperatively.  Initiate enteral blood pressure medication.  Continue antihypertensive drips in order to keep systolic blood pressure less than 130.  Tomorrow morning we will work on weaning to extubate.  Placing an orogastric tube in order to get controlled meds at the moment.  Monitor urine output closely.      CRITICAL CARE TIME: My full attention was spent providing medically necessary critical care to the patient, exclusive of time spent on any procedures, for 50 minutes, with details documented in my note.       ____________________________________     Celeste Lorenz M.D.    DD: 5/21/2024  3:35 PM

## 2024-05-21 NOTE — ANESTHESIA PROCEDURE NOTES
Airway    Date/Time: 5/21/2024 11:45 AM    Performed by: Elias Rodriguez M.D.  Authorized by: Elias Rodriguez M.D.    Location:  OR  Urgency:  Elective  Difficult Airway: No    Indications for Airway Management:  Anesthesia      Spontaneous Ventilation: absent    Sedation Level:  Deep  Preoxygenated: Yes    Patient Position:  Sniffing  Mask Difficulty Assessment:  0 - not attempted  Final Airway Type:  Endotracheal airway  Final Endotracheal Airway:  ETT  Cuffed: Yes    Technique Used for Successful ETT Placement:  Direct laryngoscopy    Insertion Site:  Oral  Blade Type:  Soy  Laryngoscope Blade/Videolaryngoscope Blade Size:  3  ETT Size (mm):  8.0  Measured from:  Teeth  ETT to Teeth (cm):  23  Placement Verified by: auscultation and capnometry    Cormack-Lehane Classification:  Grade I - full view of glottis  Number of Attempts at Approach:  1

## 2024-05-21 NOTE — ASSESSMENT & PLAN NOTE
Type B dissection.  5/21 Emergent endovascular repair of descending thoracic aortic type B dissection with stent graft, angioplasty and self-expanding stent placement right external iliac artery and stenting of left common iliac artery.  Gagandeep Kay MD [facility].

## 2024-05-21 NOTE — CONSULTS
VASCULAR SURGERY CONSULTATION      DATE OF CONSULTATION: 5/21/2024     REFERRING PHYSICIAN:     CONSULTING PHYSICIAN: Gagandeep Arnett M.D.    REASON FOR CONSULTATION: Evaluate patient with acute type B aortic dissection with right lower extremity associated ischemia    HISTORY OF PRESENT ILLNESS: The patient is a 53 y.o. gentleman who was in his usual state of health until this morning when he developed acute onset of severe back pain and right leg pain.  Patient was brought in for evaluation.  CT scan demonstrates a complicated type B aortic dissection with a complicated dissection flap in the descending thoracic aorta.  There appears to be an occlusion of the celiac as well as right external iliac artery and there is some also an issue with the left iliac as well.  Patient is awake and alert reporting back pain the patient is maxed out on esmolol and calcium channel backup drip.  His current blood pressure is 104.  Patient is a large man.  Patient has motor function in both lower extremities.    PAST MEDICAL HISTORY:   Patient reports a history of hypertension that he does not comply with his medical regiment    PAST SURGICAL HISTORY: patient denies any surgical history     ALLERGIES: No Known Allergies     CURRENT MEDICATIONS:   Home Medications       Reviewed by Gustavo Bae (Pharmacy Tech) on 05/21/24 at 0945  Med List Status: Complete     Medication Last Dose Status        Patient Jean Paul Taking any Medications                         Audit from Redirected Encounters    **Home medications have not yet been reviewed for this encounter**         FAMILY HISTORY: No family history on file.    History reviewed. No pertinent family history.       SOCIAL HISTORY:   Social History     Tobacco Use    Smoking status: Not on file    Smokeless tobacco: Not on file   Substance and Sexual Activity    Alcohol use: Not on file    Drug use: Not on file    Sexual activity: Not on file       ROS   Patient reports back pain  as stated above with associated lower extremity discomfort  All other systems were reviewed and are negative (AMA/CMS criteria)                Physical Exam    Patient is awake and alert  Morbidly obese  Lungs clear  Heart regular rate and rhythm  Abdomen obese  Absent femoral popliteal and pedal pulses but the patient is a very large man  Motor function present both lower extremities    LABORATORY VALUES:   Recent Labs     05/21/24  0845   WBC 15.3*   RBC 5.52   HEMOGLOBIN 18.5*   HEMATOCRIT 53.7*   MCV 97.3   MCH 33.5*   MCHC 34.5   RDW 51.2*   PLATELETCT 170   MPV 10.0     Recent Labs     05/21/24  0845   SODIUM 136   POTASSIUM 4.9   CHLORIDE 100   CO2 20   GLUCOSE 120*   BUN 22   CREATININE 1.16   CALCIUM 9.1     Recent Labs     05/21/24  0845   ASTSGOT 56*   ALTSGPT 60*   TBILIRUBIN 1.4   ALKPHOSPHAT 82   GLOBULIN 3.5   INR 1.18*     Recent Labs     05/21/24  0845   APTT 30.6   INR 1.18*        IMAGING:   CT-CTA AORTA-RO WITH & W/O-POST PROCESS   Final Result      1.  [place] type B aortic dissection      2.  Importantly, there is thrombus filling the celiac axis and there is distal flow that is likely reconstitution.      3.  The renal arteries are patent.      4.  There is thrombosis/occlusion of the right common iliac artery and right external iliac artery extending to the femoral artery. The visualized femoral artery is patent.      5.  Findings were discussed with TROY BALL on 5/21/2024 10:38 AM.      CT-CTA COMPLETE THORACOABDOMINAL AORTA    (Results Pending)   IR-THORACIC AORTOGRAM    (Results Pending)       IMPRESSION AND PLAN:     Acute complicated type B aortic dissection  Right lower extremity ischemia  Occluded celiac artery  Severe hypertension    I discussed in detail the pathophysiology and natural history of acute type B aortic dissections we discussed complicated versus uncomplicated dissections.  Patient has a complicated dissection and warrants emergent intervention.    Will proceed to  the hybrid operating suite for emergent endovascular repair of thoracic aortic dissection and any necessary procedures to revascularize and perfused the patient's extremities.    Patient demonstrates a clear understanding and agrees to proceed  ____________________________________   Gagandeep Arnett M.D.          DD: 5/21/2024   DT: 11:43 AM

## 2024-05-21 NOTE — ASSESSMENT & PLAN NOTE
Esmolol and nicardipine drips after surgery.  5/23 Esmolol discontinued.   5/24 SBP parameter <140 mmHg maintained with oral antihypertensives.

## 2024-05-21 NOTE — OP REPORT
[facility] OPERATIVE NOTE    05/21/24      PRE-OPERATIVE DIAGNOSIS -     Acute complicated type B thoracoabdominal aortic dissection  Right lower extremity ischemia  Malignant hypertension    POST-OPERATIVE DIAGNOSIS -     Complicated acute type B thoracoabdominal aortic dissection  Right iliac artery occlusion  Left iliac artery dynamic stenosis  Malignant hypertension    PROCEDURE PERFORMED -     Emergent endovascular repair of descending thoracic aortic type B dissection  With placement of a 40 mm x 15 cm thoracic aortic stent graft  2.  Exposure bilateral common femoral arteries  3. Angioplasty and self-expanding stent placement right external iliac artery  4. Angioplasty and stenting of left common iliac artery using 9 mm x 25 mm balloon expandable stent  5. Catheter placement ascending aorta  6. Left to right femoral to femoral artery bypass using 7 mm dacryon graft  7. Arch aortogram  8.  Abdominal and iliac artery angiogram      SURGEON - Gagandeep Arnett M.D.    ASSISTANT - BYRON Hernandez    TYPE OF ANESTHESIA -  General endotracheal intubation with transesophageal echo    ANESTHESIOLOGIST  - Anesthesiologist: Elias Rodriguez M.D.     SPECIMENS -none    INDICATIONS - 53 y.o. gentleman who presented with acute back pain and right leg pain.  The patient was diagnosed with an acute complicated type B thoracoabdominal aortic dissection with right iliac artery occlusion.  Patient is being taken to the hybrid endovascular suite for definitive care.    PROCEDURE IN DETAIL -     Patient was taken emergently to the hybrid endovascular suite at [facility] and placed in the supine position.  The patient's abdomen groins and thighs were prepped and draped in sterile fashion.  An incision was made in oblique fashion overlying the right inguinal ligament.  Incision was carried down to the subcutaneous tissue and the right common femoral artery was dissected from the surrounding tissue and isolated with  Vesseloops along with the profunda femoris and proximal superficial femoral artery.  Interestingly there was a hematoma noted around the artery.  A mirror image incision was made on the contralateral side contralateral common femoral profunda femoris and proximal superficial femoral arteries were dissected from the surrounding tissue and isolated and of note there was hematoma within the femoral sheath as well on the side.  There is a palpable but diminished femoral pulse on the left side and absent pulse on the right side.  Next a thinwall access needle was inserted into the left common femoral artery and a 6 [place] sheath was placed using a Seldinger technique.  A Glidewire was then advanced through the aorta and captured by the transesophageal echoprobe picked up the wire and it was noted to be and remain in true lumen throughout its course into the ascending aorta.  Once true lumen position of the wire was confirmed with Paulo transesophageal echo and visually wire exchange took place for a Lunderquist wire.  10,000 units of heparin was then administered.  Over the stiff Lunderquist wire a 22 [place] sheath was positioned in the infrarenal aorta.  Next a marker catheter was used to perform an arch angiogram which demonstrated the dissection.  The dissection appeared to be in the region of the left subclavian artery origin.  If not slightly before.  Next a 40 mm x 15 cm thoracic aortic stent graft was advanced through the 22 [place] sheath and was delivered to the area.  It was deployed at the level of the subclavian artery with a portion of it covering the subclavian artery.  After deployment of the endograft a completion angiogram demonstrated persistent flow into the false lumen however I did opted not to be more aggressive within the arch risking retrograde dissection into a type a dissection which I was wanted to avoid at all costs.  I then noted that a pulse did return within the right femoral artery after  deployment of the stent graft.  Unfortunately this did not propagate into the profunda or superficial femoral artery suggesting that this was pulsatile flow within the false lumen.  I did inserted a wire into the superficial femoral artery and advanced that retrograde into the aorta.  Performed a retrograde angiogram which demonstrated occlusion of the vessel I did place a 7 mm self-expanding stent however I was unsure if I was in the true or false lumen at this point and did not feel comfortable with the inflow.  Based on this I did elected to go ahead and perform a femoral to femoral artery bypass.  The 22 [place] sheath was removed and the vessel was a clamp proximally and distally after retrieval of all wires.  A 7 mm dacryon graft was tunneled from 1 side to the other and was properly spatulated.  End to side anastomosis was completed between the dacryon graft and the femoral artery using 5-0 Prolene suture.  I then cut the graft to length and performed a longitudinal arteriotomy on the right side.  And the side anastomosis was completed between the dacryon graft and the femoral artery using a 5-0 Prolene suture.  Flow was established.  At this point I elected to perform a retrograde angiogram on the left side look for any dynamic or issues related to the inflow.  I advanced a retrograde 6 [place] sheath and performed a retrograde pelvic angiogram which demonstrated what appeared to be a dynamic stenosis of the common femoral artery.  I then placed a 9 mm x 25 mm balloon expandable stent the left common iliac artery perform a retrograde angiogram everything looked good.  All sheaths catheters wires were retrieved.  That puncture site was closed using a 5-0 Prolene.  2-0 Vicryl running sutures were used to the subcutaneous tissue and skin staples for the skin incision sterile dressings were applied and the patient was taken intubated to the intensive care unit  _______________________  Gagandeep Arnett M.D.

## 2024-05-21 NOTE — ED TRIAGE NOTES
"Chief Complaint   Patient presents with    Back Pain     Mid back pain associated with RLE pain / numbness sudden onset 03:00am today    Leg Pain     Decreased cap refill BLE     Pt BIB [facility] for above complaints, VSS on RA, GCS 15, pt in obvious discomfort.     Code aorta called on pt on arrival, taken to CT scan immediately.    BP (!) 184/88   Pulse 72   Temp 36.7 °C (98 °F) (Temporal)   Resp (!) 24   Ht 1.778 m (5' 10\")   Wt (!) 145 kg (320 lb)   SpO2 94%   BMI 45.92 kg/m²     "

## 2024-05-21 NOTE — ANESTHESIA PROCEDURE NOTES
Central Venous Line    Performed by: Elias Rodriguez M.D.  Authorized by: Elias Rodriguez M.D.    Start Time:  5/21/2024 11:50 AM  End Time:  5/21/2024 11:55 AM  Patient Location:  OR  Indication: central venous access and hemodynamic monitoring        provider hand hygiene performed prior to central venous catheter insertion, all 5 sterile barriers used (gloves, gown, cap, mask, large sterile drape) during central venous catheter insertion and skin prep agent completely dried prior to procedure    Patient Position:  Trendelenburg  Laterality:  Right  Site:  Internal jugular  Prep:  Chlorhexidine  Catheter Size:  7 Fr  Catheter Length (cm):  20  Number of Lumens:  Triple lumen  target vein identified, needle advanced into vein and blood aspirated and guidewire advanced into vein    Seldinger Technique?: Yes    Ultrasound-Guided: ultrasound-guided  Image captured, interpreted and electronically stored.  Sterile Gel and Probe Cover Used for Ultrasound?: Yes    Intravenous Verification: verified by ultrasound, venous blood return and chest x-ray pending    all ports aspirated, all ports flushed easily, guidewire was removed intact, biopatch was applied, line was sutured in place and dressing was applied    Events: patient tolerated procedure well with no complications    PA Catheter Placed?: No

## 2024-05-21 NOTE — ANESTHESIA PROCEDURE NOTES
WILFREDO    Date/Time: 5/21/2024 11:56 AM    Performed by: Elias Rodriguez M.D.  Authorized by: Elias Rodriguez M.D.    Start Time:5/21/2024 11:56 AM  Preanesthetic Checklist: patient identified, IV checked, site marked, risks and benefits discussed, surgical consent, monitors and equipment checked, pre-op evaluation and timeout performed    Indication for WILFRDEO: diagnostic   Patient Location: OR  Intubated: Yes  Bite Block: Yes  Heart Visualized: Yes  Insertion: atraumatic    **See FULL WILFREDO report in patient's chart via CV [facility]**

## 2024-05-21 NOTE — ED PROVIDER NOTES
ED Provider Note    CHIEF COMPLAINT  Chief Complaint   Patient presents with    Back Pain     Mid back pain associated with RLE pain / numbness sudden onset 03:00am today    Leg Pain     Decreased cap refill BLE       EXTERNAL RECORDS REVIEWED  None available    HPI/ROS  LIMITATION TO HISTORY   Patient received ketamine prior to arrival poor historian  OUTSIDE HISTORIAN(S):  Critical care EMS provided history    Rene Kelley is a 53 y.o. male who presents for evaluation of a constellation of symptoms including abrupt onset back pain, nausea vomiting transient chest pain and now severe pain to the right leg.  [place] [facility]  EMS was activated and due to the patient's body habitus they could not fly him.  EMS had significant concern for possible vascular catastrophe.  He was given intramuscular morphine as well as intramuscular ketamine.  Once they establish an IV they administered some fentanyl.  Patient has an extensive history of smoking.  He does have hypertension.  He has no knowledge of any peripheral artery disease or known coronary artery disease.  He reports 10 out of 10 pain.  He has decreased sensation subjectively to the right lower extremity.  EMS could not palpate pulses either in the femoral triangle or the dorsalis pedal of the right foot.  He denies any numbness tingling weakness to the arms or face.  No speech abnormalities no headache    PAST MEDICAL HISTORY   Hypertension, extensive smoking history    SURGICAL HISTORY  patient denies any surgical history    FAMILY HISTORY  No family history on file.    SOCIAL HISTORY  Social History     Tobacco Use    Smoking status: Not on file    Smokeless tobacco: Not on file   Substance and Sexual Activity    Alcohol use: Not on file    Drug use: Not on file    Sexual activity: Not on file       CURRENT MEDICATIONS  Home Medications       Reviewed by Gustavo Bae (Pharmacy Tech) on 05/21/24 at 0945  Med  "List Status: Complete     Medication Last Dose Status        Patient Jean Paul Taking any Medications                         Audit from Redirected Encounters    **Home medications have not yet been reviewed for this encounter**         ALLERGIES  No Known Allergies    PHYSICAL EXAM  VITAL SIGNS: BP 91/50   Pulse (!) 56   Temp 36.7 °C (98 °F) (Temporal)   Resp 18   Ht 1.778 m (5' 10\")   Wt (!) 145 kg (320 lb)   SpO2 95%   BMI 45.92 kg/m²    Pulse ox interpretation: I interpret this pulse ox as normal.  Constitutional: Alert and oriented x 3, pale ill-appearing appears critically ill  HEENT: Atraumatic normocephalic, pupils are equal round reactive to light extraocular movements are intact. The nares is clear, external ears are normal, mouth shows moist mucous membranes normal dentition for age  Neck: Supple, no JVD no tracheal deviation  Cardiovascular: Regular rate and rhythm no murmur rub or gallop 2+ pulses peripherally x4  Thorax & Lungs: No respiratory distress, no wheezes rales or rhonchi, No chest tenderness.   GI: Soft nontender nondistended positive bowel sounds, no peritoneal signs  Diffuse back pain without any midline thoracolumbar bony tenderness  Skin: Warm dry no acute rash or lesion  Musculoskeletal: Right lower extremity is mottled cannot palpate a femoral or dorsalis pedal pulse delayed capillary refill and it is cool to the touch   neurologic: Cranial nerves III through XII are grossly intact no sensory deficit no cerebellar dysfunction   Psychiatric: Appropriate affect for situation at this time          EKG/LABS  Results for orders placed or performed during the hospital encounter of 05/21/24   CBC WITH DIFFERENTIAL   Result Value Ref Range    WBC 15.3 (H) 4.8 - 10.8 K/uL    RBC 5.52 4.70 - 6.10 M/uL    Hemoglobin 18.5 (H) 14.0 - 18.0 g/dL    Hematocrit 53.7 (H) 42.0 - 52.0 %    MCV 97.3 81.4 - 97.8 fL    MCH 33.5 (H) 27.0 - 33.0 pg    MCHC 34.5 32.3 - 36.5 g/dL    RDW 51.2 (H) 35.9 - 50.0 fL "    Platelet Count 170 164 - 446 K/uL    MPV 10.0 9.0 - 12.9 fL    Neutrophils-Polys 83.50 (H) 44.00 - 72.00 %    Lymphocytes 7.30 (L) 22.00 - 41.00 %    Monocytes 7.20 0.00 - 13.40 %    Eosinophils 0.50 0.00 - 6.90 %    Basophils 0.80 0.00 - 1.80 %    Immature Granulocytes 0.70 0.00 - 0.90 %    Nucleated RBC 0.00 0.00 - 0.20 /100 WBC    Neutrophils (Absolute) 12.74 (H) 1.82 - 7.42 K/uL    Lymphs (Absolute) 1.11 1.00 - 4.80 K/uL    Monos (Absolute) 1.10 (H) 0.00 - 0.85 K/uL    Eos (Absolute) 0.08 0.00 - 0.51 K/uL    Baso (Absolute) 0.12 0.00 - 0.12 K/uL    Immature Granulocytes (abs) 0.10 0.00 - 0.11 K/uL    NRBC (Absolute) 0.00 K/uL   Comp Metabolic Panel   Result Value Ref Range    Sodium 136 135 - 145 mmol/L    Potassium 4.9 3.6 - 5.5 mmol/L    Chloride 100 96 - 112 mmol/L    Co2 20 20 - 33 mmol/L    Anion Gap 16.0 7.0 - 16.0    Glucose 120 (H) 65 - 99 mg/dL    Bun 22 8 - 22 mg/dL    Creatinine 1.16 0.50 - 1.40 mg/dL    Calcium 9.1 8.5 - 10.5 mg/dL    Correct Calcium 9.2 8.5 - 10.5 mg/dL    AST(SGOT) 56 (H) 12 - 45 U/L    ALT(SGPT) 60 (H) 2 - 50 U/L    Alkaline Phosphatase 82 30 - 99 U/L    Total Bilirubin 1.4 0.1 - 1.5 mg/dL    Albumin 3.9 3.2 - 4.9 g/dL    Total Protein 7.4 6.0 - 8.2 g/dL    Globulin 3.5 1.9 - 3.5 g/dL    A-G Ratio 1.1 g/dL   LIPASE   Result Value Ref Range    Lipase 36 11 - 82 U/L   TROPONIN   Result Value Ref Range    Troponin T 18 6 - 19 ng/L   Prothrombin Time   Result Value Ref Range    PT 15.1 (H) 12.0 - 14.6 sec    INR 1.18 (H) 0.87 - 1.13   APTT   Result Value Ref Range    APTT 30.6 24.7 - 36.0 sec   PLATELET MAPPING WITH BASIC TEG   Result Value Ref Range    Reaction Time Initial-R 4.7 4.6 - 9.1 min    React Time Initial Hep 4.9 4.3 - 8.3 min    Clot Kinetics-K 2.2 (H) 0.8 - 2.1 min    Clot Angle-Angle 67.2 63.0 - 78.0 degrees    Maximum Clot Strength-MA 49.8 (L) 52.0 - 69.0 mm    TEG Functional Fibrinogen(MA) 15.0 15.0 - 32.0 mm    % Inhibition ADP 37.1 (H) 0.0 - 17.0 %    % Inhibition  AA 72.7 (H) 0.0 - 11.0 %    TEG Algorithm Link Algorithm    ESTIMATED GFR   Result Value Ref Range    GFR (CKD-EPI) 75 >60 mL/min/1.73 m 2   COD - Adult (Type and Screen)   Result Value Ref Range    ABO Grouping Only A     Rh Grouping Only POS     Antibody Screen-Cod NEG    ABO Rh Confirm   Result Value Ref Range    ABO Rh Confirm A POS    EKG   Result Value Ref Range    Report       [facility] Emergency Dept.    Test Date:  2024  Pt Name:    JAYA DUCKWORTH                 Department: ER  MRN:        [id number]                      Room:       [facility]  Gender:     Male                         Technician: [id number]  :        1970                   Requested By:TROY BALL  Order #:    [id number]                    Reading MD:    Measurements  Intervals                                Axis  Rate:       81                           P:          19  HI:         136                          QRS:        -5  QRSD:       114                          T:          57  QT:         430  QTc:        500    Interpretive Statements  Sinus rhythm  Atrial premature complex  Borderline intraventricular conduction delay  Abnormal R-wave progression, early transition  Borderline prolonged QT interval  No previous ECG available for comparison       12Lead EKG interpretation by me rate 81 sinus rhythm PAC noted no acute ST segment elevation or depression no biological T wave version no evidence of arrhythmia or heart block or ischemia  RADIOLOGY/PROCEDURES   I have independently interpreted the diagnostic imaging associated with this visit and am waiting the final reading from the radiologist.   My preliminary interpretation is as follows: Acute descending type B aortic dissection with extension down through the iliacs and femoral artery    Radiologist interpretation:  CT-LOWER EXTREMITY BILATERAL WITH CONTRAST   Final Result      1.  Aortic dissection extending into the right common iliac and right external iliac  arteries with associated thrombus      2.  Reconstitution at the level of the common femoral and superficial femoral arteries      3.  At the level of the mid thigh there is lack of detectable flow within the superficial femoral artery      4.  Lack of detectable flow within the popliteal artery or the arteries of the right calf      CT-CTA AORTA-RO WITH & W/O-POST PROCESS   Final Result      1.  [place] type B aortic dissection      2.  Importantly, there is thrombus filling the celiac axis and there is distal flow that is likely reconstitution.      3.  The renal arteries are patent.      4.  There is thrombosis/occlusion of the right common iliac artery and right external iliac artery extending to the femoral artery. The visualized femoral artery is patent.      5.  Findings were discussed with TROY BALL on 5/21/2024 10:38 AM.      IR-THORACIC AORTOGRAM    (Results Pending)       COURSE & MEDICAL DECISION MAKING    ASSESSMENT, COURSE AND PLAN  Care Narrative:     This is a critically ill patient.  He arrived directly from the scene for onset of back pain nausea vomiting and during transport with critical care EMS developed a pale cool exquisitely painful right lower extremity.  When the patient was immediately placed in her first resuscitation room and was quite clear that I felt he had an acute vascular emergency.  Code aorta was immediately initiated.  2 large-bore IVs were established and the patient was given IV labetalol.  I accompanied the patient down to CT scan which confirmed a type B [place] dissection tracking all the way down into the iliacs and right femoral artery.  Emergent consultation with vascular surgery was obtained with Dr. Nettles.  2 large-bore peripheral IVs were established and he was started on both esmolol continuous infusion as well as nicardipine infusion.  He was hypertensive and tachycardic on arrival but after aggressive blood pressure and heart rate control we got his heart  rate down to around 60 and blood pressure down to 110/50.  Dr. Nettles emergently took the patient to the operating room for revascularization.  The patient has an acutely life-threatening condition that could very well cause death and/or permanent disability.  Patient was also given parenteral pain and nausea medication.          ADDITIONAL PROBLEMS MANAGED      DISPOSITION AND DISCUSSIONS  I have discussed management of the patient with the following physicians and TAO's: Discussed with vascular surgeon    Discussion of management with other QHP or appropriate source(s): Discussed and coordinated with ER pharmacist    Escalation of care considered, and ultimately not performed: None    Barriers to care at this time, including but not limited to: No PCP.     Decision tools and prescription drugs considered including, but not limited to: None.    FINAL DIAGNOSIS  Acute descending aortic dissection with extension into the femoral artery with critical limb ischemia    CRITICAL CARE  The very real possibilty of a deterioration of this patient's condition required the highest level of my preparedness for sudden, emergent intervention.  I provided critical care services, which included medication orders, frequent reevaluations of the patient's condition and response to treatment, ordering and reviewing test results, and discussing the case with various consultants.  The critical care time associated with the care of the patient was 50 minutes. Review chart for interventions. This time is exclusive of any other billable procedures.          Electronically signed by: Kevin Smith M.D., 5/21/2024 9:24 AM

## 2024-05-21 NOTE — CARE PLAN
The patient is Unstable - High likelihood or risk of patient condition declining or worsening    Shift Goals  Clinical Goals: SBP<130, HR<60, neurovascular checks, monitor I&Os  Patient Goals: unable to assess  Family Goals: not present      Problem: Skin Integrity  Goal: Skin integrity is maintained or improved  Outcome: Progressing     Problem: Safety - Medical Restraint  Goal: Remains free of injury from restraints (Restraint for Interference with Medical Device)  Outcome: Progressing

## 2024-05-21 NOTE — ANESTHESIA PREPROCEDURE EVALUATION
" Case: [id number] Date/Time: 05/21/24 6306    Procedures:       AORTIC DISSECTION REPAIR (Groin)      ECHOCARDIOGRAM, TRANSESOPHAGEAL, INTRAOPERATIVE (Throat)    Anesthesia type: General    Location: [place] [address] / SURGERY [facility]    Surgeons: Gagandeep Arnett M.D.            Relevant Problems   No relevant active problems     Acute AAA.    BP 91/50   Pulse (!) 56   Temp 36.7 °C (98 °F) (Temporal)   Resp 18   Ht 1.778 m (5' 10\")   Wt (!) 145 kg (320 lb)   SpO2 95%   BMI 45.92 kg/m²     Physical Exam    Airway   Mallampati: II  TM distance: >3 FB  Neck ROM: full       Cardiovascular - normal exam  Rhythm: regular  Rate: normal  (-) murmur     Dental - normal exam           Pulmonary - normal exam  Breath sounds clear to auscultation     Abdominal    Neurological - normal exam                   Anesthesia Plan    ASA 5- EMERGENT   ASA physical status 5 criteria: ruptured abdominal/thoracic aneurysm    Plan - general       Airway plan will be ETT  WILFREDO Planned        Induction: intravenous    Postoperative Plan: Postoperative administration of opioids is intended.    Pertinent diagnostic labs and testing reviewed    Informed Consent:    Anesthetic plan and risks discussed with patient.    Use of blood products discussed with: patient whom consented to blood products.           "

## 2024-05-21 NOTE — OR NURSING
Upon admission to OR, pulse heard via doppler on left lower extremity, no pulse found on right lower extremity.

## 2024-05-21 NOTE — ED NOTES
Med Rec completed per patient and home pharmacy ([facility])   Allergies reviewed  No ORAL antibiotics in last 30 days    Patient is not taking anticoagulants     Patient states that he takes a blood pressure pill (unknown name) and that he last took it a few days ago   [facility]   [facility] and [facility] have not [facility] any medications for patient in the last year

## 2024-05-21 NOTE — PROGRESS NOTES
Pt arrived to T924 from OR with tech, RN and anesthesia.    Pt unable to consent to looking through belongings. Belongings not checked.    4 Eyes Skin Assessment Completed by Phyllis COTA, and BEATA Wilson.    Head Redness - general redness/flushed face, large mole/skin tag on foreead  Ears WDL  Nose WDL  Mouth WDL  Neck WDL  Breast/Chest WDL  Shoulder Blades Redness and Blanching  Spine Redness and Blanching  (R) Arm/Elbow/Hand Redness and Blanching  (L) Arm/Elbow/Hand Redness and Blanching  Abdomen WDL  Groin Redness, Blanching, Excoriation, and Incision - placed interdry  Scrotum/Coccyx/Buttocks WDL  (R) Leg Scab knee  (L) Leg Redness, Blanching, Scab, and Abrasion to inner knee  (R) Heel/Foot/Toe Redness, Blanching, and Boggy, flaky calloused  (L) Heel/Foot/Toe Redness, Blanching, and Boggy, flaky calloused      Devices In Places ECG, Blood Pressure Cuff, Pulse Ox, Coker, Arterial Line, SCD's, and ET Tube      Interventions In Place Sacral Mepilex, Heel Float Boots, TAP System, Pillows, Q2 Turns, Low Air Loss Mattress, and Heels Loaded W/Pillows    Possible Skin Injury No    Pictures Uploaded Into Epic N/A  Wound Consult Placed N/A  RN Wound Prevention Protocol Ordered Yes

## 2024-05-21 NOTE — ED NOTES
Pt resting in bed, VSS on 3L NC, GCS 15, NAD, pt aware POC to await further results and treat HTN

## 2024-05-21 NOTE — ANESTHESIA PROCEDURE NOTES
Arterial Line    Performed by: Elias Rodriguez M.D.  Authorized by: Elias Rodriguez M.D.    Start Time:  5/21/2024 11:48 AM  End Time:  5/21/2024 11:49 AM  Localization: surface landmarks    Patient Location:  OR  Indication: continuous blood pressure monitoring        Catheter Size:  20 G  Seldinger Technique?: Yes    Site:  Radial artery  Line Secured:  Antimicrobial disc, tape and transparent dressing  Events: patient tolerated procedure well with no complications

## 2024-05-22 ENCOUNTER — APPOINTMENT (OUTPATIENT)
Dept: RADIOLOGY | Facility: MEDICAL CENTER | Age: 54
DRG: 219 | End: 2024-05-22
Attending: SURGERY
Payer: COMMERCIAL

## 2024-05-22 LAB
ALBUMIN SERPL BCP-MCNC: 2.9 G/DL (ref 3.2–4.9)
ALBUMIN/GLOB SERPL: 1.1 G/DL
ALP SERPL-CCNC: 64 U/L (ref 30–99)
ALT SERPL-CCNC: 45 U/L (ref 2–50)
AMPHET UR QL SCN: POSITIVE
ANION GAP SERPL CALC-SCNC: 10 MMOL/L (ref 7–16)
AST SERPL-CCNC: 46 U/L (ref 12–45)
BARBITURATES UR QL SCN: NEGATIVE
BASE EXCESS BLDA CALC-SCNC: -2 MMOL/L (ref -4–3)
BENZODIAZ UR QL SCN: NEGATIVE
BILIRUB SERPL-MCNC: 1.2 MG/DL (ref 0.1–1.5)
BODY TEMPERATURE: ABNORMAL DEGREES
BREATHS SETTING VENT: 26
BUN SERPL-MCNC: 31 MG/DL (ref 8–22)
BZE UR QL SCN: NEGATIVE
CALCIUM ALBUM COR SERPL-MCNC: 8.9 MG/DL (ref 8.5–10.5)
CALCIUM SERPL-MCNC: 8 MG/DL (ref 8.5–10.5)
CANNABINOIDS UR QL SCN: NEGATIVE
CHLORIDE SERPL-SCNC: 103 MMOL/L (ref 96–112)
CO2 BLDA-SCNC: 25 MMOL/L (ref 20–33)
CO2 SERPL-SCNC: 22 MMOL/L (ref 20–33)
CREAT SERPL-MCNC: 2.3 MG/DL (ref 0.5–1.4)
DELSYS IDSYS: ABNORMAL
ERYTHROCYTE [DISTWIDTH] IN BLOOD BY AUTOMATED COUNT: 55 FL (ref 35.9–50)
FENTANYL UR QL: POSITIVE
GFR SERPLBLD CREATININE-BSD FMLA CKD-EPI: 33 ML/MIN/1.73 M 2
GLOBULIN SER CALC-MCNC: 2.6 G/DL (ref 1.9–3.5)
GLUCOSE SERPL-MCNC: 111 MG/DL (ref 65–99)
HCO3 BLDA-SCNC: 23.5 MMOL/L (ref 17–25)
HCT VFR BLD AUTO: 46.6 % (ref 42–52)
HGB BLD-MCNC: 15 G/DL (ref 14–18)
HOROWITZ INDEX BLDA+IHG-RTO: 180 MM[HG]
LACTATE BLD-SCNC: 0.9 MMOL/L (ref 0.5–2)
MAGNESIUM SERPL-MCNC: 2 MG/DL (ref 1.5–2.5)
MCH RBC QN AUTO: 32.8 PG (ref 27–33)
MCHC RBC AUTO-ENTMCNC: 32.2 G/DL (ref 32.3–36.5)
MCV RBC AUTO: 101.7 FL (ref 81.4–97.8)
METHADONE UR QL SCN: NEGATIVE
MODE IMODE: ABNORMAL
O2/TOTAL GAS SETTING VFR VENT: 40 %
OPIATES UR QL SCN: POSITIVE
OXYCODONE UR QL SCN: NEGATIVE
PCO2 BLDA: 42.7 MMHG (ref 26–37)
PCO2 TEMP ADJ BLDA: 42.9 MMHG (ref 26–37)
PCP UR QL SCN: NEGATIVE
PEEP END EXPIRATORY PRESSURE IPEEP: 8 CMH20
PH BLDA: 7.35 [PH] (ref 7.4–7.5)
PH TEMP ADJ BLDA: 7.35 [PH] (ref 7.4–7.5)
PHOSPHATE SERPL-MCNC: 4.7 MG/DL (ref 2.5–4.5)
PLATELET # BLD AUTO: 113 K/UL (ref 164–446)
PMV BLD AUTO: 10.1 FL (ref 9–12.9)
PO2 BLDA: 72 MMHG (ref 64–87)
PO2 TEMP ADJ BLDA: 72 MMHG (ref 64–87)
POTASSIUM SERPL-SCNC: 5.3 MMOL/L (ref 3.6–5.5)
PROPOXYPH UR QL SCN: NEGATIVE
PROT SERPL-MCNC: 5.5 G/DL (ref 6–8.2)
RBC # BLD AUTO: 4.58 M/UL (ref 4.7–6.1)
SAO2 % BLDA: 93 % (ref 93–99)
SODIUM SERPL-SCNC: 135 MMOL/L (ref 135–145)
SPECIMEN DRAWN FROM PATIENT: ABNORMAL
TIDAL VOLUME IVT: 440 ML
WBC # BLD AUTO: 13.4 K/UL (ref 4.8–10.8)

## 2024-05-22 PROCEDURE — 700111 HCHG RX REV CODE 636 W/ 250 OVERRIDE (IP): Performed by: SURGERY

## 2024-05-22 PROCEDURE — 94640 AIRWAY INHALATION TREATMENT: CPT

## 2024-05-22 PROCEDURE — 83735 ASSAY OF MAGNESIUM: CPT

## 2024-05-22 PROCEDURE — 94150 VITAL CAPACITY TEST: CPT

## 2024-05-22 PROCEDURE — 94669 MECHANICAL CHEST WALL OSCILL: CPT

## 2024-05-22 PROCEDURE — 83605 ASSAY OF LACTIC ACID: CPT

## 2024-05-22 PROCEDURE — 770022 HCHG ROOM/CARE - ICU (200)

## 2024-05-22 PROCEDURE — 80053 COMPREHEN METABOLIC PANEL: CPT

## 2024-05-22 PROCEDURE — A9270 NON-COVERED ITEM OR SERVICE: HCPCS | Performed by: SURGERY

## 2024-05-22 PROCEDURE — 99406 BEHAV CHNG SMOKING 3-10 MIN: CPT

## 2024-05-22 PROCEDURE — 71045 X-RAY EXAM CHEST 1 VIEW: CPT

## 2024-05-22 PROCEDURE — 700102 HCHG RX REV CODE 250 W/ 637 OVERRIDE(OP): Performed by: SURGERY

## 2024-05-22 PROCEDURE — 82803 BLOOD GASES ANY COMBINATION: CPT

## 2024-05-22 PROCEDURE — 80307 DRUG TEST PRSMV CHEM ANLYZR: CPT

## 2024-05-22 PROCEDURE — 94003 VENT MGMT INPAT SUBQ DAY: CPT

## 2024-05-22 PROCEDURE — 94799 UNLISTED PULMONARY SVC/PX: CPT

## 2024-05-22 PROCEDURE — 85027 COMPLETE CBC AUTOMATED: CPT

## 2024-05-22 PROCEDURE — 94664 DEMO&/EVAL PT USE INHALER: CPT

## 2024-05-22 PROCEDURE — 700105 HCHG RX REV CODE 258: Performed by: SURGERY

## 2024-05-22 PROCEDURE — 700111 HCHG RX REV CODE 636 W/ 250 OVERRIDE (IP): Performed by: EMERGENCY MEDICINE

## 2024-05-22 PROCEDURE — 37799 UNLISTED PX VASCULAR SURGERY: CPT

## 2024-05-22 PROCEDURE — 84100 ASSAY OF PHOSPHORUS: CPT

## 2024-05-22 PROCEDURE — 700101 HCHG RX REV CODE 250: Performed by: SURGERY

## 2024-05-22 RX ORDER — IPRATROPIUM BROMIDE AND ALBUTEROL SULFATE 2.5; .5 MG/3ML; MG/3ML
3 SOLUTION RESPIRATORY (INHALATION)
Status: DISCONTINUED | OUTPATIENT
Start: 2024-05-22 | End: 2024-05-23

## 2024-05-22 RX ORDER — LABETALOL HYDROCHLORIDE 5 MG/ML
10 INJECTION, SOLUTION INTRAVENOUS EVERY 4 HOURS PRN
Status: DISCONTINUED | OUTPATIENT
Start: 2024-05-22 | End: 2024-05-23

## 2024-05-22 RX ORDER — CARVEDILOL 12.5 MG/1
12.5 TABLET ORAL 2 TIMES DAILY WITH MEALS
Status: DISCONTINUED | OUTPATIENT
Start: 2024-05-22 | End: 2024-05-25

## 2024-05-22 RX ORDER — FAMOTIDINE 20 MG/1
20 TABLET, FILM COATED ORAL DAILY
Status: DISCONTINUED | OUTPATIENT
Start: 2024-05-23 | End: 2024-05-25

## 2024-05-22 RX ORDER — ENOXAPARIN SODIUM 100 MG/ML
40 INJECTION SUBCUTANEOUS EVERY 12 HOURS
Status: DISCONTINUED | OUTPATIENT
Start: 2024-05-22 | End: 2024-05-24

## 2024-05-22 RX ORDER — IPRATROPIUM BROMIDE AND ALBUTEROL SULFATE 2.5; .5 MG/3ML; MG/3ML
3 SOLUTION RESPIRATORY (INHALATION)
Status: DISCONTINUED | OUTPATIENT
Start: 2024-05-22 | End: 2024-05-23 | Stop reason: DRUGHIGH

## 2024-05-22 RX ADMIN — ESMOLOL HYDROCHLORIDE IN SODIUM CHLORIDE 50 MCG/KG/MIN: 20 INJECTION INTRAVENOUS at 19:37

## 2024-05-22 RX ADMIN — ALBUTEROL SULFATE 2.5 MG: 2.5 SOLUTION RESPIRATORY (INHALATION) at 02:15

## 2024-05-22 RX ADMIN — ALBUTEROL SULFATE 2.5 MG: 2.5 SOLUTION RESPIRATORY (INHALATION) at 07:10

## 2024-05-22 RX ADMIN — SODIUM CHLORIDE: 9 INJECTION, SOLUTION INTRAVENOUS at 00:46

## 2024-05-22 RX ADMIN — OXYCODONE HYDROCHLORIDE 10 MG: 10 TABLET ORAL at 17:25

## 2024-05-22 RX ADMIN — CARVEDILOL 6.25 MG: 6.25 TABLET, FILM COATED ORAL at 07:13

## 2024-05-22 RX ADMIN — IPRATROPIUM BROMIDE AND ALBUTEROL SULFATE 3 ML: 2.5; .5 SOLUTION RESPIRATORY (INHALATION) at 13:23

## 2024-05-22 RX ADMIN — CARVEDILOL 12.5 MG: 12.5 TABLET, FILM COATED ORAL at 17:16

## 2024-05-22 RX ADMIN — OXYCODONE HYDROCHLORIDE 10 MG: 10 TABLET ORAL at 21:14

## 2024-05-22 RX ADMIN — Medication 1 APPLICATOR: at 05:18

## 2024-05-22 RX ADMIN — ENOXAPARIN SODIUM 40 MG: 100 INJECTION SUBCUTANEOUS at 17:16

## 2024-05-22 RX ADMIN — FAMOTIDINE 20 MG: 10 INJECTION, SOLUTION INTRAVENOUS at 05:19

## 2024-05-22 RX ADMIN — HYDROMORPHONE HYDROCHLORIDE 1 MG: 1 INJECTION, SOLUTION INTRAMUSCULAR; INTRAVENOUS; SUBCUTANEOUS at 09:12

## 2024-05-22 RX ADMIN — HYDROMORPHONE HYDROCHLORIDE 1 MG: 1 INJECTION, SOLUTION INTRAMUSCULAR; INTRAVENOUS; SUBCUTANEOUS at 06:01

## 2024-05-22 RX ADMIN — IPRATROPIUM BROMIDE AND ALBUTEROL SULFATE 3 ML: 2.5; .5 SOLUTION RESPIRATORY (INHALATION) at 18:35

## 2024-05-22 RX ADMIN — ESMOLOL HYDROCHLORIDE IN SODIUM CHLORIDE 50 MCG/KG/MIN: 20 INJECTION INTRAVENOUS at 14:19

## 2024-05-22 RX ADMIN — AMLODIPINE BESYLATE 10 MG: 10 TABLET ORAL at 07:13

## 2024-05-22 RX ADMIN — LABETALOL HYDROCHLORIDE 10 MG: 5 INJECTION INTRAVENOUS at 12:15

## 2024-05-22 RX ADMIN — ENOXAPARIN SODIUM 40 MG: 100 INJECTION SUBCUTANEOUS at 11:07

## 2024-05-22 RX ADMIN — ESMOLOL HYDROCHLORIDE IN SODIUM CHLORIDE 100 MCG/KG/MIN: 20 INJECTION INTRAVENOUS at 22:27

## 2024-05-22 RX ADMIN — HYDROMORPHONE HYDROCHLORIDE 1 MG: 1 INJECTION, SOLUTION INTRAMUSCULAR; INTRAVENOUS; SUBCUTANEOUS at 04:31

## 2024-05-22 RX ADMIN — OXYCODONE HYDROCHLORIDE 10 MG: 10 TABLET ORAL at 07:23

## 2024-05-22 RX ADMIN — ESMOLOL HYDROCHLORIDE IN SODIUM CHLORIDE 50 MCG/KG/MIN: 20 INJECTION INTRAVENOUS at 05:18

## 2024-05-22 RX ADMIN — ESMOLOL HYDROCHLORIDE IN SODIUM CHLORIDE 50 MCG/KG/MIN: 20 INJECTION INTRAVENOUS at 00:47

## 2024-05-22 RX ADMIN — OXYCODONE HYDROCHLORIDE 10 MG: 10 TABLET ORAL at 13:49

## 2024-05-22 RX ADMIN — ALBUTEROL SULFATE 2.5 MG: 2.5 SOLUTION RESPIRATORY (INHALATION) at 10:13

## 2024-05-22 RX ADMIN — HYDROMORPHONE HYDROCHLORIDE 1 MG: 1 INJECTION, SOLUTION INTRAMUSCULAR; INTRAVENOUS; SUBCUTANEOUS at 01:47

## 2024-05-22 RX ADMIN — Medication 1 APPLICATOR: at 17:16

## 2024-05-22 RX ADMIN — PROPOFOL 80 MCG/KG/MIN: 10 INJECTION, EMULSION INTRAVENOUS at 05:19

## 2024-05-22 RX ADMIN — PROPOFOL 80 MCG/KG/MIN: 10 INJECTION, EMULSION INTRAVENOUS at 02:36

## 2024-05-22 RX ADMIN — NICOTINE TRANSDERMAL SYSTEM 21 MG: 21 PATCH, EXTENDED RELEASE TRANSDERMAL at 05:18

## 2024-05-22 ASSESSMENT — PAIN DESCRIPTION - PAIN TYPE
TYPE: ACUTE PAIN

## 2024-05-22 ASSESSMENT — PULMONARY FUNCTION TESTS: FVC: 1.8

## 2024-05-22 ASSESSMENT — FIBROSIS 4 INDEX: FIB4 SCORE: 3.16

## 2024-05-22 NOTE — PROGRESS NOTES
"    INTERVAL EVENTS AND INTERVENTIONS: Extubated this morning after rounds.  Patient is awake and alert.  We are trying to figure out his home medication list so that we can resume his home antihypertensive in addition to the medications we started here.  His esmolol drip is on standby.  White blood cell count 13,000.  His hyperkalemia has improved.  His renal function did take a hit with all the contrast he got as well as the control of his blood pressure.  We are going to continue watching him closely or monitoring urine output in the ICU.  Advancing diet as tolerated.  Starting Lovenox.  Increase mobility.     The patient remains critically ill with acute respiratory failure, acute kidney injury, and aortic dissection .  The patient was seen and examined on rounds and discussed with the multidisciplinary critical care team and consulting physicians. Critically evaluated laboratory tests, culture data, medications, imaging, and other diagnostic tests.    The patient has acute impairment of one or more vital organ systems and a high probability of imminent or life-threatening deterioration in condition. Provided high complexity decision making to assess, manipulate, and support vital system functions to treat vital organ system failure and/or to prevent further life-threatening deterioration of the patient's condition. Requires continued ICU management and hospital admission.    Critical care interventions include: integration of multiple data points and associated complex medical decision making, active ventilator management, parenteral management of hypertension, management of acute kidney injury, management of critical electrolyte abnormalities, and management of thrombotic surveillance and risk mitigation.    PHYSICAL EXAMINATION:      Vital Signs: BP 98/66   Pulse 69   Temp 36.7 °C (98 °F) (Temporal)   Resp 19   Ht 1.778 m (5' 10\")   Wt (!) 149 kg (329 lb 9.4 oz)   SpO2 97%   Physical Exam  Vitals and " nursing note reviewed. Exam conducted with a chaperone present.   HENT:      Head: Normocephalic and atraumatic.      Right Ear: External ear normal.      Left Ear: External ear normal.      Nose: Nose normal.      Mouth/Throat:      Mouth: Mucous membranes are dry.      Pharynx: Oropharynx is clear.   Eyes:      Pupils: Pupils are equal, round, and reactive to light.   Cardiovascular:      Rate and Rhythm: Normal rate and regular rhythm.      Comments: Doppler pulses  Pulmonary:      Effort: Pulmonary effort is normal.      Breath sounds: Wheezing present.   Abdominal:      General: There is distension.      Palpations: Abdomen is soft.      Tenderness: There is no abdominal tenderness.   Genitourinary:     Comments: Coker to gravity drainage  Musculoskeletal:         General: Normal range of motion.   Skin:     General: Skin is warm and dry.      Capillary Refill: Capillary refill takes 2 to 3 seconds.   Neurological:      General: No focal deficit present.      Mental Status: He is alert and oriented to person, place, and time.   Psychiatric:         Mood and Affect: Mood normal.     LABORATORY VALUES AND IMAGING REVIEWED  White blood cell 13.4  Hemoglobin 15  Platelets 113  Potassium 5.3, BUN 31, creatinine 2.3    ASSESSMENT AND PLAN:   Patient extubated this morning.  He is on nasal cannula.  Needs aggressive pulmonary toilet.  Increase mobility.  Hyperkalemia improved.  Started diet.  Initiate Lovenox.  Ongoing IV fluids with accurate ins and outs monitoring creatinine closely.  Initiate home medication for hypertension with supplemental therapy as needed.      CRITICAL CARE TIME: My full attention was spent providing medically necessary critical care to the patient, exclusive of time spent on any procedures, for 45 minutes, with details documented in my note.       ____________________________________     Celeste Lorenz M.D.    DD: 5/22/2024  8:29 AM

## 2024-05-22 NOTE — CARE PLAN
Problem: Ventilation Defect:  Goal: Ability to achieve and maintain unassisted ventilation or tolerate decreased levels of ventilator support  Outcome: Progressing     Ventilator Daily Summary    Vent Day # 1   Airway: 8.0 @23    Ventilator settings: 26/440/8/40%  Weaning trials: no   Respiratory Procedures:  Albuterol Q4HRS    Plan: Continue current ventilator settings and wean mechanical ventilation as tolerated per physician orders.

## 2024-05-22 NOTE — RESPIRATORY CARE
Extubation    Cuff leak noted yes  Stridor present no     FiO2%: 40 % (05/22/24 0800)  O2 (LPM): 2 (05/22/24 0900)     Patient toleration well  RCP Complete? yes  Events/Summary/Plan: Extubation (05/22/24 0935)

## 2024-05-22 NOTE — CARE PLAN
Problem: Bronchoconstriction  Goal: Improve in air movement and diminished wheezing  Description: Target End Date:  2 to 3 days    1.  Implement inhaled treatments  2.  Evaluate and manage medication effects  Outcome: Progressing     Problem: Bronchopulmonary Hygiene  Goal: Increase mobilization of retained secretions  Description: Target End Date:  2 to 3 days    1.  Perform bronchopulmonary therapy as indicated by assessment  2.  Perform airway suctioning  3.  Perform actions to maintain patient airway  Outcome: Progressing   Duoneb Q4  Flutter QID  Noc cpap

## 2024-05-22 NOTE — CARE PLAN
The patient is Watcher - Medium risk of patient condition declining or worsening    Shift Goals  Clinical Goals: SBP<130, HR<60, neurovascular checks  Patient Goals: unable to assess  Family Goals: not present    Progress made toward(s) clinical / shift goals:      Problem: Skin Integrity  Goal: Skin integrity is maintained or improved  Outcome: Progressing     Problem: Fall Risk  Goal: Patient will remain free from falls  Outcome: Progressing     Problem: Safety - Medical Restraint  Goal: Remains free of injury from restraints (Restraint for Interference with Medical Device)  Outcome: Progressing     Problem: Pain - Standard  Goal: Alleviation of pain or a reduction in pain to the patient’s comfort goal  Outcome: Progressing       Patient is not progressing towards the following goals:    Problem: Knowledge Deficit - Standard  Goal: Patient and family/care givers will demonstrate understanding of plan of care, disease process/condition, diagnostic tests and medications  Outcome: Not Progressing     Problem: Safety - Medical Restraint  Goal: Free from restraint(s) (Restraint for Interference with Medical Device)  Outcome: Not Progressing

## 2024-05-22 NOTE — DIETARY
NUTRITION SERVICES: BMI - Pt with BMI >40 (=Body mass index is 47.29 kg/m².), Class III obesity. Weight loss counseling not appropriate in acute care setting. RECOMMEND - If appropriate at DC please refer to outpatient nutrition services for weight management.

## 2024-05-22 NOTE — CARE PLAN
Problem: Ventilation Defect:  Goal: Ability to achieve and maintain unassisted ventilation or tolerate decreased levels of ventilator support  Outcome: Progressing     Vent Day 1   8.0 @ 23  APVCMV: 26, 440,8, 40%  Albuterol Q4

## 2024-05-22 NOTE — PROGRESS NOTES
Vascular surgery    Postop day 1 status post emergent endovascular repair descending thoracic aortic dissection with placement of thoracic stent graft  Left to right femoral to femoral artery bypass  Iliac stent placement    Patient is endotracheally intubated  Becomes quite agitated with out sedation  Abdomen is soft  Urine output 500 cc last night  Bilateral lower extremities are warm and well-perfused    Incisions are clean dry and intact    Labs reviewed    Creatinine slightly elevated at 2.3    Impression -  Thoracic stent graft placement for acute type B dissection complicated  False lumen continued to feel proximally as it is unlikely that the stent graft cover the inflow tear  Inflow tear was likely in the distal arch  Stent graft did probably increase luminal gain of true lumen  Patient is lower extremities are well-perfused  Follow renal function    Appreciate surgical critical care excellent help    Vascular surgery following    Gagandeep Arnett MD

## 2024-05-22 NOTE — FLOWSHEET NOTE
05/22/24 0857   Weaning Parameters   RR (bpm) 12   $ FVC / Vital Capacity (liters)  1.8   NIF (cm H2O)  -26   Rapid Shallow Breathing Index (RR/VT) 18   Spontaneous VE 7.7   Spontaneous      Pt follows commands, has a cuff leak.

## 2024-05-22 NOTE — RESPIRATORY CARE
"COPD EDUCATION by COPD CLINICAL EDUCATOR  5/22/2024  at  2:01 PM by Anitra Villalta RRT     Patient interviewed by education team.  Patient declined or is unable to participate in the full program.  Therefore, a short intervention has been conducted.  A comprehensive packet including information about COPD, types of treatments to manage their disease and safe home Oxygen usage was provided and reviewed with patient at the bedside.  We reviewed his COPD medications and confirmed he takes Wixela and Spiriva, and has an Albuterol rescue inhaler. Patient given a spacer with instruction.      Smoking Cessation Intervention and education completed, 3 minutes spent on smoking cessation education with patient.  Provided smoking cessation packet with \"Tips to Quit\" and brochure for \"Free Smoking Cessation Classes\".       COPD Screen  COPD Risk Screening  Do you have a history of COPD?: Yes  Do you have a Pulmonologist?: Yes    COPD Assessment  COPD Clinical Specialists ONLY  COPD Education Initiated: Yes--Short Intervention (Given COPD booklet and requested COPD medication orders from attending for inpatient.)  Is this a COPD exacerbation patient?: No  DME Company: none prior  DME Equipment Type: none prior  [facility]: [facility]  [facility]: [facility]  $ Demo/Eval of SVN's, MDI's and Aerosols: Yes (spacer)  (OP) Pulmonary Function Testing:  (none on file)  Interdisciplinary Rounds: Attendance at Rounds (30 Min)    PFT Results    No results found for: \"PFT\"    Meds to Beds  [facility] provides bedside medication delivery for all eligible patients at discharge.  Would you like to opt out of this program for any reason?: Yes - Opt Out  Reason the patient would like to opt out of Bedside Medication Delivery at Discharge?: Patient prefers another pharmacy     MY COPD ACTION PLAN     It is recommended that patients and physicians /healthcare providers complete this action plan together. This plan should be discussed at each " "physician visit and updated as needed.    The green, yellow and red zones show groups of symptoms of COPD. This list of symptoms is not comprehensive, and you may experience other symptoms. In the \"Actions\" column, your healthcare provider has recommended actions for you to take based on your symptoms.    Patient Name: Rene Kelley   YOB: 1970   Last Updated on:     Green Zone:  I am doing well today Actions     Usual activitiy and exercise level   Take daily medications     Usual amounts of cough and phlegm/mucus   Use oxygen as prescribed     Sleep well at night   Continue regular exercise/diet plan     Appetite is good   At all times avoid cigarette smoke, inhaled irritants     Daily Medications (these medications are taken every day):   Fluticasone and Salmeterol (Wixela)  Tiotropium Bromide Monohydrate (Spiriva) 1 Puff  2 Puffs Twice daily  Once daily     Additional Information:  Rinse mouth after taking Wixela.    Yellow Zone:  I am having a bad day or a COPD flare Actions     More breathless than usual   Continue daily medications     I have less energy for my daily activities   Use quick relief inhaler as ordered     Increased or thicker phlegm/mucus   Use oxygen as prescribed     Using quick relief inhaler/nebulizer more often   Get plenty of rest     Swelling of ankles more than usual   Use pursed lip breathing     More coughing than usual   At all times avoid cigarette smoke, inhaled irritants     I feel like I have a \"chest cold\"     Poor sleep and my symptoms woke me up     My appetite is not good     My medicine is not helping      Call provider immediately if symptoms don’t improve     Continue daily medications, add rescue medications:   Albuterol 2 Puffs         Medications to be used during a flare up, (as Discussed with Provider):           Additional Information:  Use as directed, use with a spacer.    Red Zone:  I need urgent medical care Actions     Severe shortness of " breath even at rest   Call 911 or seek medical care immediately     Not able to do any activity because of breathing      Fever or shaking chills      Feeling confused or very drowsy       Chest pains      Coughing up blood

## 2024-05-22 NOTE — CARE PLAN
The patient is Watcher - Medium risk of patient condition declining or worsening    Shift Goals  Clinical Goals: SBP<130, HR<60, neurovascular checks, monitor I&Os  Patient Goals: unable to assess  Family Goals: not present      Problem: Knowledge Deficit - Standard  Goal: Patient and family/care givers will demonstrate understanding of plan of care, disease process/condition, diagnostic tests and medications  Outcome: Progressing     Problem: Skin Integrity  Goal: Skin integrity is maintained or improved  Outcome: Progressing     Problem: Safety - Medical Restraint  Goal: Remains free of injury from restraints (Restraint for Interference with Medical Device)  Outcome: Progressing     Problem: Pain - Standard  Goal: Alleviation of pain or a reduction in pain to the patient’s comfort goal  Outcome: Progressing

## 2024-05-23 ENCOUNTER — APPOINTMENT (OUTPATIENT)
Dept: RADIOLOGY | Facility: MEDICAL CENTER | Age: 54
DRG: 219 | End: 2024-05-23
Attending: SURGERY
Payer: COMMERCIAL

## 2024-05-23 LAB
ALBUMIN SERPL BCP-MCNC: 2.9 G/DL (ref 3.2–4.9)
ALBUMIN/GLOB SERPL: 1.1 G/DL
ALP SERPL-CCNC: 58 U/L (ref 30–99)
ALT SERPL-CCNC: 47 U/L (ref 2–50)
ANION GAP SERPL CALC-SCNC: 10 MMOL/L (ref 7–16)
AST SERPL-CCNC: 49 U/L (ref 12–45)
BILIRUB SERPL-MCNC: 2.3 MG/DL (ref 0.1–1.5)
BUN SERPL-MCNC: 37 MG/DL (ref 8–22)
CALCIUM ALBUM COR SERPL-MCNC: 9.1 MG/DL (ref 8.5–10.5)
CALCIUM SERPL-MCNC: 8.2 MG/DL (ref 8.5–10.5)
CHLORIDE SERPL-SCNC: 101 MMOL/L (ref 96–112)
CO2 SERPL-SCNC: 20 MMOL/L (ref 20–33)
CREAT SERPL-MCNC: 1.93 MG/DL (ref 0.5–1.4)
ERYTHROCYTE [DISTWIDTH] IN BLOOD BY AUTOMATED COUNT: 54.2 FL (ref 35.9–50)
GFR SERPLBLD CREATININE-BSD FMLA CKD-EPI: 41 ML/MIN/1.73 M 2
GLOBULIN SER CALC-MCNC: 2.6 G/DL (ref 1.9–3.5)
GLUCOSE SERPL-MCNC: 94 MG/DL (ref 65–99)
HCT VFR BLD AUTO: 41.3 % (ref 42–52)
HGB BLD-MCNC: 13.8 G/DL (ref 14–18)
LV EJECT FRACT  99904: 55
MAGNESIUM SERPL-MCNC: 1.8 MG/DL (ref 1.5–2.5)
MCH RBC QN AUTO: 33.5 PG (ref 27–33)
MCHC RBC AUTO-ENTMCNC: 33.4 G/DL (ref 32.3–36.5)
MCV RBC AUTO: 100.2 FL (ref 81.4–97.8)
PHOSPHATE SERPL-MCNC: 4 MG/DL (ref 2.5–4.5)
PLATELET # BLD AUTO: 100 K/UL (ref 164–446)
PLATELETS.RETICULATED NFR BLD AUTO: 4.5 % (ref 0.6–13.1)
PMV BLD AUTO: 10 FL (ref 9–12.9)
POTASSIUM SERPL-SCNC: 5.4 MMOL/L (ref 3.6–5.5)
PROT SERPL-MCNC: 5.5 G/DL (ref 6–8.2)
RBC # BLD AUTO: 4.12 M/UL (ref 4.7–6.1)
SODIUM SERPL-SCNC: 131 MMOL/L (ref 135–145)
WBC # BLD AUTO: 13.5 K/UL (ref 4.8–10.8)

## 2024-05-23 PROCEDURE — 71045 X-RAY EXAM CHEST 1 VIEW: CPT

## 2024-05-23 PROCEDURE — A9270 NON-COVERED ITEM OR SERVICE: HCPCS | Performed by: SURGERY

## 2024-05-23 PROCEDURE — 97163 PT EVAL HIGH COMPLEX 45 MIN: CPT

## 2024-05-23 PROCEDURE — 700111 HCHG RX REV CODE 636 W/ 250 OVERRIDE (IP): Mod: JZ | Performed by: SURGERY

## 2024-05-23 PROCEDURE — 700102 HCHG RX REV CODE 250 W/ 637 OVERRIDE(OP): Performed by: SURGERY

## 2024-05-23 PROCEDURE — 97167 OT EVAL HIGH COMPLEX 60 MIN: CPT

## 2024-05-23 PROCEDURE — 94669 MECHANICAL CHEST WALL OSCILL: CPT

## 2024-05-23 PROCEDURE — 85055 RETICULATED PLATELET ASSAY: CPT

## 2024-05-23 PROCEDURE — 80053 COMPREHEN METABOLIC PANEL: CPT

## 2024-05-23 PROCEDURE — 84100 ASSAY OF PHOSPHORUS: CPT

## 2024-05-23 PROCEDURE — 99233 SBSQ HOSP IP/OBS HIGH 50: CPT | Performed by: SURGERY

## 2024-05-23 PROCEDURE — 700105 HCHG RX REV CODE 258: Performed by: SURGERY

## 2024-05-23 PROCEDURE — 94640 AIRWAY INHALATION TREATMENT: CPT

## 2024-05-23 PROCEDURE — 700111 HCHG RX REV CODE 636 W/ 250 OVERRIDE (IP): Mod: JG | Performed by: SURGERY

## 2024-05-23 PROCEDURE — 700101 HCHG RX REV CODE 250: Performed by: SURGERY

## 2024-05-23 PROCEDURE — 85027 COMPLETE CBC AUTOMATED: CPT

## 2024-05-23 PROCEDURE — 770022 HCHG ROOM/CARE - ICU (200)

## 2024-05-23 PROCEDURE — 83735 ASSAY OF MAGNESIUM: CPT

## 2024-05-23 RX ORDER — LABETALOL HYDROCHLORIDE 5 MG/ML
20 INJECTION, SOLUTION INTRAVENOUS EVERY 4 HOURS PRN
Status: DISCONTINUED | OUTPATIENT
Start: 2024-05-23 | End: 2024-05-26 | Stop reason: HOSPADM

## 2024-05-23 RX ORDER — AMOXICILLIN 250 MG
2 CAPSULE ORAL EVERY EVENING
Status: DISCONTINUED | OUTPATIENT
Start: 2024-05-23 | End: 2024-05-25

## 2024-05-23 RX ORDER — GABAPENTIN 100 MG/1
100 CAPSULE ORAL 3 TIMES DAILY
Status: DISCONTINUED | OUTPATIENT
Start: 2024-05-23 | End: 2024-05-25

## 2024-05-23 RX ORDER — FUROSEMIDE 10 MG/ML
40 INJECTION INTRAMUSCULAR; INTRAVENOUS ONCE
Status: COMPLETED | OUTPATIENT
Start: 2024-05-23 | End: 2024-05-23

## 2024-05-23 RX ORDER — LABETALOL HYDROCHLORIDE 5 MG/ML
10 INJECTION, SOLUTION INTRAVENOUS EVERY 4 HOURS PRN
Status: DISCONTINUED | OUTPATIENT
Start: 2024-05-23 | End: 2024-05-23

## 2024-05-23 RX ORDER — HYDRALAZINE HYDROCHLORIDE 20 MG/ML
20 INJECTION INTRAMUSCULAR; INTRAVENOUS EVERY 4 HOURS PRN
Status: DISCONTINUED | OUTPATIENT
Start: 2024-05-23 | End: 2024-05-26 | Stop reason: HOSPADM

## 2024-05-23 RX ORDER — POLYETHYLENE GLYCOL 3350 17 G/17G
1 POWDER, FOR SOLUTION ORAL
Status: DISCONTINUED | OUTPATIENT
Start: 2024-05-23 | End: 2024-05-25

## 2024-05-23 RX ORDER — IPRATROPIUM BROMIDE AND ALBUTEROL SULFATE 2.5; .5 MG/3ML; MG/3ML
3 SOLUTION RESPIRATORY (INHALATION)
Status: DISCONTINUED | OUTPATIENT
Start: 2024-05-23 | End: 2024-05-25

## 2024-05-23 RX ORDER — IPRATROPIUM BROMIDE AND ALBUTEROL SULFATE 2.5; .5 MG/3ML; MG/3ML
3 SOLUTION RESPIRATORY (INHALATION)
Status: DISCONTINUED | OUTPATIENT
Start: 2024-05-23 | End: 2024-05-26 | Stop reason: HOSPADM

## 2024-05-23 RX ORDER — MAGNESIUM SULFATE HEPTAHYDRATE 40 MG/ML
2 INJECTION, SOLUTION INTRAVENOUS ONCE
Status: COMPLETED | OUTPATIENT
Start: 2024-05-23 | End: 2024-05-23

## 2024-05-23 RX ADMIN — IPRATROPIUM BROMIDE AND ALBUTEROL SULFATE 3 ML: 2.5; .5 SOLUTION RESPIRATORY (INHALATION) at 14:07

## 2024-05-23 RX ADMIN — GABAPENTIN 100 MG: 100 CAPSULE ORAL at 18:20

## 2024-05-23 RX ADMIN — ESMOLOL HYDROCHLORIDE IN SODIUM CHLORIDE 100 MCG/KG/MIN: 20 INJECTION INTRAVENOUS at 09:24

## 2024-05-23 RX ADMIN — ENOXAPARIN SODIUM 40 MG: 100 INJECTION SUBCUTANEOUS at 18:20

## 2024-05-23 RX ADMIN — IPRATROPIUM BROMIDE AND ALBUTEROL SULFATE 3 ML: 2.5; .5 SOLUTION RESPIRATORY (INHALATION) at 06:40

## 2024-05-23 RX ADMIN — ESMOLOL HYDROCHLORIDE IN SODIUM CHLORIDE 100 MCG/KG/MIN: 20 INJECTION INTRAVENOUS at 02:40

## 2024-05-23 RX ADMIN — CARVEDILOL 12.5 MG: 12.5 TABLET, FILM COATED ORAL at 18:20

## 2024-05-23 RX ADMIN — SODIUM CHLORIDE: 9 INJECTION, SOLUTION INTRAVENOUS at 00:03

## 2024-05-23 RX ADMIN — SENNOSIDES AND DOCUSATE SODIUM 2 TABLET: 50; 8.6 TABLET ORAL at 18:20

## 2024-05-23 RX ADMIN — OXYCODONE HYDROCHLORIDE 10 MG: 10 TABLET ORAL at 02:37

## 2024-05-23 RX ADMIN — MAGNESIUM SULFATE HEPTAHYDRATE 2 G: 2 INJECTION, SOLUTION INTRAVENOUS at 09:29

## 2024-05-23 RX ADMIN — Medication 1 APPLICATOR: at 18:20

## 2024-05-23 RX ADMIN — ESMOLOL HYDROCHLORIDE IN SODIUM CHLORIDE 100 MCG/KG/MIN: 20 INJECTION INTRAVENOUS at 04:48

## 2024-05-23 RX ADMIN — IPRATROPIUM BROMIDE AND ALBUTEROL SULFATE 3 ML: 2.5; .5 SOLUTION RESPIRATORY (INHALATION) at 10:10

## 2024-05-23 RX ADMIN — FAMOTIDINE 20 MG: 20 TABLET, FILM COATED ORAL at 05:12

## 2024-05-23 RX ADMIN — OXYCODONE HYDROCHLORIDE 10 MG: 10 TABLET ORAL at 07:46

## 2024-05-23 RX ADMIN — MAGNESIUM HYDROXIDE 30 ML: 1200 LIQUID ORAL at 18:20

## 2024-05-23 RX ADMIN — Medication 1 APPLICATOR: at 05:12

## 2024-05-23 RX ADMIN — IPRATROPIUM BROMIDE AND ALBUTEROL SULFATE 3 ML: 2.5; .5 SOLUTION RESPIRATORY (INHALATION) at 03:09

## 2024-05-23 RX ADMIN — FUROSEMIDE 40 MG: 10 INJECTION INTRAMUSCULAR; INTRAVENOUS at 10:06

## 2024-05-23 RX ADMIN — AMLODIPINE BESYLATE 10 MG: 10 TABLET ORAL at 05:12

## 2024-05-23 RX ADMIN — ENOXAPARIN SODIUM 40 MG: 100 INJECTION SUBCUTANEOUS at 05:12

## 2024-05-23 RX ADMIN — ESMOLOL HYDROCHLORIDE IN SODIUM CHLORIDE 100 MCG/KG/MIN: 20 INJECTION INTRAVENOUS at 00:33

## 2024-05-23 RX ADMIN — CARVEDILOL 12.5 MG: 12.5 TABLET, FILM COATED ORAL at 07:21

## 2024-05-23 RX ADMIN — IPRATROPIUM BROMIDE AND ALBUTEROL SULFATE 3 ML: 2.5; .5 SOLUTION RESPIRATORY (INHALATION) at 20:35

## 2024-05-23 RX ADMIN — OXYCODONE HYDROCHLORIDE 10 MG: 10 TABLET ORAL at 12:42

## 2024-05-23 RX ADMIN — ESMOLOL HYDROCHLORIDE IN SODIUM CHLORIDE 100 MCG/KG/MIN: 20 INJECTION INTRAVENOUS at 07:22

## 2024-05-23 RX ADMIN — GABAPENTIN 100 MG: 100 CAPSULE ORAL at 12:43

## 2024-05-23 ASSESSMENT — COGNITIVE AND FUNCTIONAL STATUS - GENERAL
WALKING IN HOSPITAL ROOM: A LOT
CLIMB 3 TO 5 STEPS WITH RAILING: TOTAL
SUGGESTED CMS G CODE MODIFIER MOBILITY: CL
MOBILITY SCORE: 11
TURNING FROM BACK TO SIDE WHILE IN FLAT BAD: A LOT
DAILY ACTIVITIY SCORE: 11
EATING MEALS: A LITTLE
DRESSING REGULAR LOWER BODY CLOTHING: TOTAL
PERSONAL GROOMING: A LITTLE
SUGGESTED CMS G CODE MODIFIER DAILY ACTIVITY: CL
HELP NEEDED FOR BATHING: TOTAL
DRESSING REGULAR UPPER BODY CLOTHING: A LOT
TOILETING: TOTAL
MOVING TO AND FROM BED TO CHAIR: A LOT
MOVING FROM LYING ON BACK TO SITTING ON SIDE OF FLAT BED: A LOT
STANDING UP FROM CHAIR USING ARMS: A LOT

## 2024-05-23 ASSESSMENT — PAIN DESCRIPTION - PAIN TYPE
TYPE: ACUTE PAIN

## 2024-05-23 ASSESSMENT — ACTIVITIES OF DAILY LIVING (ADL): TOILETING: INDEPENDENT

## 2024-05-23 ASSESSMENT — GAIT ASSESSMENTS
DISTANCE (FEET): 2
ASSISTIVE DEVICE: FRONT WHEEL WALKER
GAIT LEVEL OF ASSIST: MODERATE ASSIST

## 2024-05-23 ASSESSMENT — FIBROSIS 4 INDEX: FIB4 SCORE: 3.16

## 2024-05-23 NOTE — CARE PLAN
The patient is Watcher - Medium risk of patient condition declining or worsening    Shift Goals  Clinical Goals: SBP <130, Hemodynamic stability, Neurovascular checks.  Patient Goals: Rest, comfort  Family Goals: STEVE    Progress made toward(s) clinical / shift goals:      Problem: Knowledge Deficit - Standard  Goal: Patient and family/care givers will demonstrate understanding of plan of care, disease process/condition, diagnostic tests and medications  Outcome: Progressing     Problem: Skin Integrity  Goal: Skin integrity is maintained or improved  Outcome: Progressing     Problem: Fall Risk  Goal: Patient will remain free from falls  Outcome: Progressing     Problem: Safety - Medical Restraint  Goal: Free from restraint(s) (Restraint for Interference with Medical Device)  Outcome: Progressing     Problem: Pain - Standard  Goal: Alleviation of pain or a reduction in pain to the patient’s comfort goal  Outcome: Progressing

## 2024-05-23 NOTE — PROGRESS NOTES
"    INTERVAL EVENTS AND INTERVENTIONS: No acute events overnight.  Patient remained extubated.  He still remains on esmolol infusion.  We are working on getting him off of all vasoactive medications IV and transitioning just to p.o. medications.  He is also getting Lasix today as his chest x-ray appears somewhat wet.  Adding bowel regimen.  Work with physical therapy.  Replace magnesium..    The patient remains critically ill with  aortic dissection .  The patient was seen and examined on rounds and discussed with the multidisciplinary critical care team and consulting physicians. Critically evaluated laboratory tests, culture data, medications, imaging, and other diagnostic tests.    The patient has acute impairment of one or more vital organ systems and a high probability of imminent or life-threatening deterioration in condition. Provided high complexity decision making to assess, manipulate, and support vital system functions to treat vital organ system failure and/or to prevent further life-threatening deterioration of the patient's condition. Requires continued ICU management and hospital admission.    Critical care interventions include: integration of multiple data points and associated complex medical decision making and parenteral management of hypertension.    PHYSICAL EXAMINATION:      Vital Signs: /66   Pulse 70   Temp 36.7 °C (98 °F) (Temporal)   Resp (!) 31   Ht 1.778 m (5' 10\")   Wt (!) 150 kg (330 lb 0.5 oz)   SpO2 99%   Physical Exam  Vitals and nursing note reviewed. Exam conducted with a chaperone present.   HENT:      Head: Normocephalic and atraumatic.      Right Ear: External ear normal.      Left Ear: External ear normal.      Nose: Nose normal.      Mouth/Throat:      Mouth: Mucous membranes are dry.      Pharynx: Oropharynx is clear.   Eyes:      Pupils: Pupils are equal, round, and reactive to light.   Cardiovascular:      Rate and Rhythm: Normal rate and regular rhythm.      " Comments: Doppler pulses  Pulmonary:      Effort: Pulmonary effort is normal.      Breath sounds: Wheezing present.   Abdominal:      General: There is distension.      Palpations: Abdomen is soft.      Tenderness: There is no abdominal tenderness.   Genitourinary:     Comments: Coker to gravity drainage  Musculoskeletal:         General: Normal range of motion.   Skin:     General: Skin is warm and dry.      Capillary Refill: Capillary refill takes 2 to 3 seconds.   Neurological:      General: No focal deficit present.      Mental Status: He is alert and oriented to person, place, and time.   Psychiatric:         Mood and Affect: Mood normal.    LABORATORY VALUES AND IMAGING REVIEWED  White blood cell count 13.5  Platelets 100,000  Sodium 131, BUN 37, creatinine down to 1.93  Urine drug screen positive for amphetamines.    ASSESSMENT AND PLAN:   Status post aortic stent graft and femoral-femoral bypass for aortic dissection.  Patient appears a bit fluid overloaded today and has severe COPD.  We are giving him Lasix today.  Initiating a bowel regimen. Ongoing pulse monitoring. Increase mobility.       CRITICAL CARE TIME: My full attention was spent providing medically necessary critical care to the patient, exclusive of time spent on any procedures, for 35 minutes, with details documented in my note.       ____________________________________     Celeste Lorenz M.D.    DD: 5/23/2024  2:27 PM

## 2024-05-23 NOTE — PROGRESS NOTES
Dr. Arnett at bedside spoke with pt extensively about his surgery/plan of care.     Per Dr. Arnett, no heart rate parameters at this time. Maintain SBP less than 135.

## 2024-05-23 NOTE — THERAPY
Physical Therapy   Initial Evaluation     Patient Name: Rene Kelley  Age:  53 y.o., Sex:  male  Medical Record #: [id number]  Today's Date: 5/23/2024    Assessment  Patient is 53 y.o. male who presented with acute onset of severe back pain & R leg pain, found to have a complicated type B aortic dissection with a complicated dissection flap in descending thoracic aorta.  Pt now POD #2 emergent endovascular repair and left to fight femoral to femoral bypass.  PMH includes methamphetamine in the setting of severe essential HTN left untreated.    Pt presented for PT evaluation with impaired balance, activity tolerance, safety awareness, generalized weakness, and increased pain.  Pt required grossly mod A for functional mobility as detailed below with FWW.  Pt primarily limited by increased pain & fatigue.  Pt is below his baseline level of function, recommend post acute placement.      Plan    Physical Therapy Initial Treatment Plan   Treatment Plan : Bed Mobility, Equipment, Gait Training, Neuro Re-Education / Balance, Self Care / Home Evaluation, Stair Training, Therapeutic Activities, Therapeutic Exercise, Family / Caregiver Training  Treatment Frequency: 4 Times per Week  Duration: Until Therapy Goals Met    DC Equipment Recommendations Unable to determine at this time  Discharge Recommendations:Recommend post-acute placement for additional physical therapy services prior to discharge home     Objective     05/23/24 0913   Pain 0 - 10 Group   Therapist Pain Assessment Post Activity Pain Same as Prior to Activity;Nurse Notified;0   Prior Living Situation   Prior Services Home-Independent   Housing / Facility Mobile Home   Steps Into Home 4   Rail Both Rail (Steps into Home)   Equipment Owned None   Lives with - Patient's Self Care Capacity Unrelated Adult   Comments Pt reported he lives with roommates   Prior Level of Functional Mobility   Bed Mobility Independent   Transfer Status Independent   Ambulation  Independent   Ambulation Distance community distances   Assistive Devices Used None   Stairs Independent   Cognition    Cognition / Consciousness X   Level of Consciousness Alert   Comments Pleasant & cooperative, limited by pain   Active ROM Lower Body    Active ROM Lower Body  X   Comments B hip flexion limited by pain & body habitus; otherwise WFL   Strength Lower Body   Lower Body Strength  WDL   Comments WFL, generalized weakness   Sensation Lower Body   Lower Extremity Sensation   WDL   Comments Denied N/T   Balance Assessment   Sitting Balance (Static) Fair   Sitting Balance (Dynamic) Fair -   Standing Balance (Static) Poor +   Standing Balance (Dynamic) Poor   Weight Shift Sitting Poor   Weight Shift Standing Poor   Comments limited by pain   Bed Mobility    Comments NT, up in chair pre & post session   Gait Analysis   Gait Level Of Assist Moderate Assist   Assistive Device Front Wheel Walker   Distance (Feet) 2   # of Times Distance was Traveled 1   Deviation (wide MATTHIEU, shuffled steps)   Weight Bearing Status No restrictions   Functional Mobility   Sit to Stand Moderate Assist   Mobility STS, forward steps x 2   Comments chair follow   Activity Tolerance   Sitting in Chair Pre & post session   Standing <5 min   Comments limited by pain   Short Term Goals    Short Term Goal # 1 Pt will perform supine <> sit without bed features with SPV in 6 visits to progress bed mobility   Short Term Goal # 2 Pt will perform STS with FWW and SPV in 6 visits to progress OOB mobility   Short Term Goal # 3 Pt will perform stand pivot transfers with FWW and SPV in 6 visits to progress functional OOB mobility   Short Term Goal # 4 Pt will ambulate 150 ft with FWW and SPV in 6 visits to progress functional gait   Short Term Goal # 5 Pt will ascend & descend 4 steps with B rail & SPV in 6 visits to access home     Patient benefited from team-evaluation with Occupational Therapist for the following reason(s):  Patient required 2  person assistance for safety and to provide effective interventions. Each discipline assisted patient with appropriate and separate goals. Due to the medical complexity, the skill of both practitioners is needed to monitor vitals, patient status, and adjust the intervention to fit the patient's needs and goals.

## 2024-05-23 NOTE — PROGRESS NOTES
Vascular surgery    Postop day 2 status post emergent endovascular repair of descending thoracic aortic type B dissection with placement of thoracic stent graft  Patient also underwent iliac stenting and femoral to femoral artery bypass as the type B dissection was complicated.    Patient has been extubated and is sitting in a chair  Remains on IV antihypertensive medication to control his severe hypertension.    Drug toxicology returned positive for methamphetamines   The use of methamphetamines in the setting of severe essential hypertension left untreated likely contributed to the aortic dissection.    The patient's renal insufficiency improving  Tolerating diet    Overall the patient's clinical status is improving  Will relax blood pressure parameters to 140  Increase activity level  Continue to wean IV antihypertensive medications as we transition to orals    Had a long discussion with the patient today with respect to the pathophysiology and natural history of aortic dissection.  We discussed specifically his methamphetamine use and his poor prognosis moving forward if he does not comply with medical management and abstain from illicit drugs.    Vascular surgery following  Appreciate surgical critical care assistance    Gagandeep Arnett MD

## 2024-05-23 NOTE — THERAPY
Occupational Therapy   Initial Evaluation     Patient Name: Rene Kelley  Age:  53 y.o., Sex:  male  Medical Record #: [id number]  Today's Date: 5/23/2024     Precautions  Precautions: Fall Risk  Comments: SBP <130    Assessment  Patient is 53 y.o. male admitted with back pain and RLE pain, with decreased capillary refill found to have a type B aortic dissection with RLE ischemia. He is now s/p emergent repair of aortic dissection, stent, B iliac artery angio/stent, L>R femoral bypass.  Hx of meth use and HTN. Additional factors influencing patient status / progress: weakness, fatigue, impaired balance, pain.      Plan    Occupational Therapy Initial Treatment Plan   Treatment Interventions: Self Care / Activities of Daily Living, Adaptive Equipment, Neuro Re-Education / Balance, Therapeutic Exercises, Therapeutic Activity  Treatment Frequency: 4 Times per Week  Duration: Until Therapy Goals Met    DC Equipment Recommendations: Unable to determine at this time  Discharge Recommendations: Recommend post-acute placement for additional occupational therapy services prior to discharge home      Objective       05/23/24 0916   Prior Living Situation   Prior Services Home-Independent   Housing / Facility Mobile Home   Steps Into Home 4   Bathroom Set up Walk In Shower   Equipment Owned None   Lives with - Patient's Self Care Capacity Unrelated Adult   Comments Pt lives with a roommate but they live separate lives and do not split household chores.   Prior Level of ADL Function   Self Feeding Independent   Grooming / Hygiene Independent   Bathing Independent   Dressing Independent   Toileting Independent   Prior Level of IADL Function   Medication Management Independent   Laundry Independent   Kitchen Mobility Independent   Finances Independent   Home Management Independent   Shopping Independent   Prior Level Of Mobility Independent Without Device in Community;Independent Without Device in Home   Driving /  Transportation Walks   Occupation (Pre-Hospital Vocational) Not Employed   Precautions   Precautions Fall Risk   Comments SBP <130   Pain 0 - 10 Group   Therapist Pain Assessment Nurse Notified;During Activity  (mod c/o B groin pain, R worse than L)   Cognition    Cognition / Consciousness WDL   Level of Consciousness Alert   Comments soft spoken, pleasant and cooperative. Needed some encouragement to do self care activities   Active ROM Upper Body   Active ROM Upper Body  X   Comments reports thoracic pain when attempting to raise B UE   Balance Assessment   Sitting Balance (Static) Fair   Sitting Balance (Dynamic) Fair   Standing Balance (Static) Poor +   Standing Balance (Dynamic) Poor +   Weight Shift Sitting Fair   Weight Shift Standing Fair   Comments w/ FWW, relies on BUE to off load pain from groin incisions   Bed Mobility    Scooting Contact Guard Assist   Comments in chair pre/post   ADL Assessment   Grooming Supervision;Seated  (oral care, reports difficulty/pain/fatigue due to thoracic pain when raising hand to mouth)   Lower Body Dressing Total Assist   Toileting Total Assist   How much help from another person does the patient currently need...   Putting on and taking off regular lower body clothing? 1   Bathing (including washing, rinsing, and drying)? 1   Toileting, which includes using a toilet, bedpan, or urinal? 1   Putting on and taking off regular upper body clothing? 2   Taking care of personal grooming such as brushing teeth? 3   Eating meals? 3   6 Clicks Daily Activity Score 11   Functional Mobility   Sit to Stand Moderate Assist   Bed, Chair, Wheelchair Transfer Moderate Assist   Mobility CHair>STS>steps forward>Chair   Comments w/ FWW, relies heavily on BUE, fatigues quickly   Short Term Goals   Short Term Goal # 1 pt will demo ADL txfs w/ supv   Short Term Goal # 2 pt will dress LB w/ supv and AE prn   Short Term Goal # 3 pt will demo toileting w/ supv   Short Term Goal # 4 pt will  tolerate grooming at the sink w/ supv       Pt seen for OT eval in coordination with PT due to the need for two skilled therapists due to limited mobility and high pain

## 2024-05-23 NOTE — CARE PLAN
Problem: Knowledge Deficit - Standard  Goal: Patient and family/care givers will demonstrate understanding of plan of care, disease process/condition, diagnostic tests and medications  Outcome: Progressing     Problem: Pain - Standard  Goal: Alleviation of pain or a reduction in pain to the patient’s comfort goal  Outcome: Progressing   The patient is Watcher - Medium risk of patient condition declining or worsening    Shift Goals  Clinical Goals: stable vital signs/wean drips  Patient Goals: rest  Family Goals: n/a    Progress made toward(s) clinical / shift goals:  weaning vasoactive drips    Patient is not progressing towards the following goals: bowel movement

## 2024-05-23 NOTE — CARE PLAN
Problem: Bronchoconstriction  Goal: Improve in air movement and diminished wheezing  Description: Target End Date:  2 to 3 days    1.  Implement inhaled treatments  2.  Evaluate and manage medication effects  Outcome: Progressing     Problem: Bronchopulmonary Hygiene  Goal: Increase mobilization of retained secretions  Description: Target End Date:  2 to 3 days    1.  Perform bronchopulmonary therapy as indicated by assessment  2.  Perform airway suctioning  3.  Perform actions to maintain patient airway  Outcome: Progressing   Duoneb Q4  Flutter QID  Noc cpap: refuses

## 2024-05-24 ENCOUNTER — APPOINTMENT (OUTPATIENT)
Dept: RADIOLOGY | Facility: MEDICAL CENTER | Age: 54
DRG: 219 | End: 2024-05-24
Attending: SURGERY
Payer: COMMERCIAL

## 2024-05-24 ENCOUNTER — APPOINTMENT (OUTPATIENT)
Dept: RADIOLOGY | Facility: MEDICAL CENTER | Age: 54
DRG: 219 | End: 2024-05-24
Attending: HOSPITALIST
Payer: COMMERCIAL

## 2024-05-24 PROBLEM — Z78.9 NO CONTRAINDICATION TO DEEP VEIN THROMBOSIS (DVT) PROPHYLAXIS: Status: ACTIVE | Noted: 2024-05-24

## 2024-05-24 PROBLEM — I71.00 DISSECTION OF AORTA (HCC): Status: ACTIVE | Noted: 2024-05-24

## 2024-05-24 PROBLEM — E66.01 MORBID OBESITY DUE TO EXCESS CALORIES (HCC): Status: ACTIVE | Noted: 2024-05-24

## 2024-05-24 PROBLEM — F19.10 SUBSTANCE ABUSE (HCC): Status: ACTIVE | Noted: 2024-05-24

## 2024-05-24 PROBLEM — N17.9 AKI (ACUTE KIDNEY INJURY) (HCC): Status: ACTIVE | Noted: 2024-05-24

## 2024-05-24 PROBLEM — D69.6 THROMBOCYTOPENIA (HCC): Status: ACTIVE | Noted: 2024-05-24

## 2024-05-24 PROBLEM — J95.821 RESPIRATORY FAILURE FOLLOWING TRAUMA AND SURGERY (HCC): Status: RESOLVED | Noted: 2024-05-21 | Resolved: 2024-05-24

## 2024-05-24 PROBLEM — E87.5 HYPERKALEMIA: Status: RESOLVED | Noted: 2024-05-21 | Resolved: 2024-05-24

## 2024-05-24 LAB
ALBUMIN SERPL BCP-MCNC: 2.8 G/DL (ref 3.2–4.9)
ALBUMIN/GLOB SERPL: 0.9 G/DL
ALP SERPL-CCNC: 66 U/L (ref 30–99)
ALT SERPL-CCNC: 35 U/L (ref 2–50)
ANION GAP SERPL CALC-SCNC: 11 MMOL/L (ref 7–16)
APPEARANCE UR: CLEAR
AST SERPL-CCNC: 32 U/L (ref 12–45)
BACTERIA #/AREA URNS HPF: NEGATIVE /HPF
BILIRUB SERPL-MCNC: 1.5 MG/DL (ref 0.1–1.5)
BILIRUB UR QL STRIP.AUTO: NEGATIVE
BUN SERPL-MCNC: 43 MG/DL (ref 8–22)
CALCIUM ALBUM COR SERPL-MCNC: 9.6 MG/DL (ref 8.5–10.5)
CALCIUM SERPL-MCNC: 8.6 MG/DL (ref 8.5–10.5)
CHLORIDE SERPL-SCNC: 98 MMOL/L (ref 96–112)
CO2 SERPL-SCNC: 22 MMOL/L (ref 20–33)
COLOR UR: ABNORMAL
CREAT SERPL-MCNC: 1.71 MG/DL (ref 0.5–1.4)
EPI CELLS #/AREA URNS HPF: NEGATIVE /HPF
ERYTHROCYTE [DISTWIDTH] IN BLOOD BY AUTOMATED COUNT: 53.1 FL (ref 35.9–50)
GFR SERPLBLD CREATININE-BSD FMLA CKD-EPI: 47 ML/MIN/1.73 M 2
GLOBULIN SER CALC-MCNC: 3 G/DL (ref 1.9–3.5)
GLUCOSE SERPL-MCNC: 88 MG/DL (ref 65–99)
GLUCOSE UR STRIP.AUTO-MCNC: NEGATIVE MG/DL
HCT VFR BLD AUTO: 41.4 % (ref 42–52)
HGB BLD-MCNC: 13.7 G/DL (ref 14–18)
HYALINE CASTS #/AREA URNS LPF: ABNORMAL /LPF
KETONES UR STRIP.AUTO-MCNC: NEGATIVE MG/DL
LEUKOCYTE ESTERASE UR QL STRIP.AUTO: ABNORMAL
MAGNESIUM SERPL-MCNC: 2.4 MG/DL (ref 1.5–2.5)
MCH RBC QN AUTO: 33.1 PG (ref 27–33)
MCHC RBC AUTO-ENTMCNC: 33.1 G/DL (ref 32.3–36.5)
MCV RBC AUTO: 100 FL (ref 81.4–97.8)
MICRO URNS: ABNORMAL
NITRITE UR QL STRIP.AUTO: NEGATIVE
PH UR STRIP.AUTO: 5.5 [PH] (ref 5–8)
PHOSPHATE SERPL-MCNC: 3.7 MG/DL (ref 2.5–4.5)
PLATELET # BLD AUTO: 120 K/UL (ref 164–446)
PMV BLD AUTO: 10.7 FL (ref 9–12.9)
POTASSIUM SERPL-SCNC: 4.9 MMOL/L (ref 3.6–5.5)
PROT SERPL-MCNC: 5.8 G/DL (ref 6–8.2)
PROT UR QL STRIP: NEGATIVE MG/DL
RBC # BLD AUTO: 4.14 M/UL (ref 4.7–6.1)
RBC # URNS HPF: ABNORMAL /HPF
RBC UR QL AUTO: ABNORMAL
SODIUM SERPL-SCNC: 131 MMOL/L (ref 135–145)
SP GR UR STRIP.AUTO: 1.02
UROBILINOGEN UR STRIP.AUTO-MCNC: 0.2 MG/DL
WBC # BLD AUTO: 10.9 K/UL (ref 4.8–10.8)
WBC #/AREA URNS HPF: ABNORMAL /HPF

## 2024-05-24 PROCEDURE — 94669 MECHANICAL CHEST WALL OSCILL: CPT

## 2024-05-24 PROCEDURE — 84100 ASSAY OF PHOSPHORUS: CPT

## 2024-05-24 PROCEDURE — 700111 HCHG RX REV CODE 636 W/ 250 OVERRIDE (IP): Mod: JZ | Performed by: SURGERY

## 2024-05-24 PROCEDURE — 83735 ASSAY OF MAGNESIUM: CPT

## 2024-05-24 PROCEDURE — 770020 HCHG ROOM/CARE - TELE (206)

## 2024-05-24 PROCEDURE — 99232 SBSQ HOSP IP/OBS MODERATE 35: CPT | Performed by: PHYSICIAN ASSISTANT

## 2024-05-24 PROCEDURE — 71045 X-RAY EXAM CHEST 1 VIEW: CPT

## 2024-05-24 PROCEDURE — A9270 NON-COVERED ITEM OR SERVICE: HCPCS | Performed by: SURGERY

## 2024-05-24 PROCEDURE — A9270 NON-COVERED ITEM OR SERVICE: HCPCS | Performed by: HOSPITALIST

## 2024-05-24 PROCEDURE — 700102 HCHG RX REV CODE 250 W/ 637 OVERRIDE(OP): Performed by: SURGERY

## 2024-05-24 PROCEDURE — 700102 HCHG RX REV CODE 250 W/ 637 OVERRIDE(OP): Performed by: HOSPITALIST

## 2024-05-24 PROCEDURE — 85027 COMPLETE CBC AUTOMATED: CPT

## 2024-05-24 PROCEDURE — 81001 URINALYSIS AUTO W/SCOPE: CPT

## 2024-05-24 PROCEDURE — 76775 US EXAM ABDO BACK WALL LIM: CPT

## 2024-05-24 PROCEDURE — 700101 HCHG RX REV CODE 250: Performed by: SURGERY

## 2024-05-24 PROCEDURE — 99222 1ST HOSP IP/OBS MODERATE 55: CPT | Performed by: HOSPITALIST

## 2024-05-24 PROCEDURE — 80053 COMPREHEN METABOLIC PANEL: CPT

## 2024-05-24 PROCEDURE — 94640 AIRWAY INHALATION TREATMENT: CPT

## 2024-05-24 RX ORDER — ENOXAPARIN SODIUM 100 MG/ML
60 INJECTION SUBCUTANEOUS EVERY 12 HOURS
Status: DISCONTINUED | OUTPATIENT
Start: 2024-05-24 | End: 2024-05-24

## 2024-05-24 RX ORDER — ENOXAPARIN SODIUM 100 MG/ML
40 INJECTION SUBCUTANEOUS EVERY 12 HOURS
Status: DISCONTINUED | OUTPATIENT
Start: 2024-05-24 | End: 2024-05-26 | Stop reason: HOSPADM

## 2024-05-24 RX ORDER — CYCLOBENZAPRINE HCL 10 MG
10 TABLET ORAL 3 TIMES DAILY PRN
Status: DISCONTINUED | OUTPATIENT
Start: 2024-05-24 | End: 2024-05-25

## 2024-05-24 RX ADMIN — IPRATROPIUM BROMIDE AND ALBUTEROL SULFATE 3 ML: 2.5; .5 SOLUTION RESPIRATORY (INHALATION) at 06:52

## 2024-05-24 RX ADMIN — IPRATROPIUM BROMIDE AND ALBUTEROL SULFATE 3 ML: 2.5; .5 SOLUTION RESPIRATORY (INHALATION) at 14:34

## 2024-05-24 RX ADMIN — IPRATROPIUM BROMIDE AND ALBUTEROL SULFATE 3 ML: 2.5; .5 SOLUTION RESPIRATORY (INHALATION) at 19:22

## 2024-05-24 RX ADMIN — SENNOSIDES AND DOCUSATE SODIUM 2 TABLET: 50; 8.6 TABLET ORAL at 17:15

## 2024-05-24 RX ADMIN — GABAPENTIN 100 MG: 100 CAPSULE ORAL at 17:15

## 2024-05-24 RX ADMIN — AMLODIPINE BESYLATE 10 MG: 10 TABLET ORAL at 05:03

## 2024-05-24 RX ADMIN — CARVEDILOL 12.5 MG: 12.5 TABLET, FILM COATED ORAL at 08:32

## 2024-05-24 RX ADMIN — CYCLOBENZAPRINE 10 MG: 10 TABLET, FILM COATED ORAL at 15:41

## 2024-05-24 RX ADMIN — Medication 1 APPLICATOR: at 05:04

## 2024-05-24 RX ADMIN — GABAPENTIN 100 MG: 100 CAPSULE ORAL at 12:31

## 2024-05-24 RX ADMIN — ENOXAPARIN SODIUM 40 MG: 100 INJECTION SUBCUTANEOUS at 05:03

## 2024-05-24 RX ADMIN — CARVEDILOL 12.5 MG: 12.5 TABLET, FILM COATED ORAL at 17:16

## 2024-05-24 RX ADMIN — ENOXAPARIN SODIUM 40 MG: 100 INJECTION SUBCUTANEOUS at 17:15

## 2024-05-24 RX ADMIN — FAMOTIDINE 20 MG: 20 TABLET, FILM COATED ORAL at 05:03

## 2024-05-24 RX ADMIN — GABAPENTIN 100 MG: 100 CAPSULE ORAL at 05:03

## 2024-05-24 RX ADMIN — IPRATROPIUM BROMIDE AND ALBUTEROL SULFATE 3 ML: 2.5; .5 SOLUTION RESPIRATORY (INHALATION) at 10:07

## 2024-05-24 RX ADMIN — OXYCODONE HYDROCHLORIDE 10 MG: 10 TABLET ORAL at 17:16

## 2024-05-24 RX ADMIN — Medication 1 APPLICATOR: at 17:15

## 2024-05-24 SDOH — ECONOMIC STABILITY: TRANSPORTATION INSECURITY
IN THE PAST 12 MONTHS, HAS LACK OF RELIABLE TRANSPORTATION KEPT YOU FROM MEDICAL APPOINTMENTS, MEETINGS, WORK OR FROM GETTING THINGS NEEDED FOR DAILY LIVING?: YES

## 2024-05-24 SDOH — ECONOMIC STABILITY: TRANSPORTATION INSECURITY
IN THE PAST 12 MONTHS, HAS THE LACK OF TRANSPORTATION KEPT YOU FROM MEDICAL APPOINTMENTS OR FROM GETTING MEDICATIONS?: YES

## 2024-05-24 ASSESSMENT — COGNITIVE AND FUNCTIONAL STATUS - GENERAL
SUGGESTED CMS G CODE MODIFIER MOBILITY: CL
MOVING FROM LYING ON BACK TO SITTING ON SIDE OF FLAT BED: A LOT
DRESSING REGULAR UPPER BODY CLOTHING: A LITTLE
MOBILITY SCORE: 12
TOILETING: A LOT
PERSONAL GROOMING: A LITTLE
SUGGESTED CMS G CODE MODIFIER DAILY ACTIVITY: CK
DRESSING REGULAR LOWER BODY CLOTHING: A LOT
MOVING TO AND FROM BED TO CHAIR: A LOT
HELP NEEDED FOR BATHING: A LOT
DAILY ACTIVITIY SCORE: 16
WALKING IN HOSPITAL ROOM: A LOT
CLIMB 3 TO 5 STEPS WITH RAILING: TOTAL
STANDING UP FROM CHAIR USING ARMS: A LOT
TURNING FROM BACK TO SIDE WHILE IN FLAT BAD: A LITTLE

## 2024-05-24 ASSESSMENT — ENCOUNTER SYMPTOMS
NERVOUS/ANXIOUS: 1
FEVER: 0
BACK PAIN: 1
PALPITATIONS: 0
MYALGIAS: 1
WEAKNESS: 1
ROS GI COMMENTS: BM PTA
CHILLS: 0
SHORTNESS OF BREATH: 0
EYES NEGATIVE: 1
RESPIRATORY NEGATIVE: 1
CONSTIPATION: 1

## 2024-05-24 ASSESSMENT — SOCIAL DETERMINANTS OF HEALTH (SDOH)
WITHIN THE LAST YEAR, HAVE YOU BEEN KICKED, HIT, SLAPPED, OR OTHERWISE PHYSICALLY HURT BY YOUR PARTNER OR EX-PARTNER?: NO
IN THE PAST 12 MONTHS, HAS THE ELECTRIC, GAS, OIL, OR WATER COMPANY THREATENED TO SHUT OFF SERVICE IN YOUR HOME?: NO
WITHIN THE PAST 12 MONTHS, THE FOOD YOU BOUGHT JUST DIDN'T LAST AND YOU DIDN'T HAVE MONEY TO GET MORE: NEVER TRUE
WITHIN THE LAST YEAR, HAVE YOU BEEN AFRAID OF YOUR PARTNER OR EX-PARTNER?: NO
WITHIN THE LAST YEAR, HAVE TO BEEN RAPED OR FORCED TO HAVE ANY KIND OF SEXUAL ACTIVITY BY YOUR PARTNER OR EX-PARTNER?: NO
WITHIN THE PAST 12 MONTHS, YOU WORRIED THAT YOUR FOOD WOULD RUN OUT BEFORE YOU GOT THE MONEY TO BUY MORE: NEVER TRUE
WITHIN THE LAST YEAR, HAVE YOU BEEN HUMILIATED OR EMOTIONALLY ABUSED IN OTHER WAYS BY YOUR PARTNER OR EX-PARTNER?: NO

## 2024-05-24 ASSESSMENT — FIBROSIS 4 INDEX
FIB4 SCORE: 2.34
FIB4 SCORE: 2.34

## 2024-05-24 ASSESSMENT — LIFESTYLE VARIABLES
ON A TYPICAL DAY WHEN YOU DRINK ALCOHOL HOW MANY DRINKS DO YOU HAVE: 2
HAVE PEOPLE ANNOYED YOU BY CRITICIZING YOUR DRINKING: NO
DOES PATIENT WANT TO STOP DRINKING: NO
EVER HAD A DRINK FIRST THING IN THE MORNING TO STEADY YOUR NERVES TO GET RID OF A HANGOVER: NO
EVER FELT BAD OR GUILTY ABOUT YOUR DRINKING: NO
CONSUMPTION TOTAL: POSITIVE
TOTAL SCORE: 0
SUBSTANCE_ABUSE: 1
HAVE YOU EVER FELT YOU SHOULD CUT DOWN ON YOUR DRINKING: NO
TOTAL SCORE: 0
HOW MANY TIMES IN THE PAST YEAR HAVE YOU HAD 5 OR MORE DRINKS IN A DAY: 300
AVERAGE NUMBER OF DAYS PER WEEK YOU HAVE A DRINK CONTAINING ALCOHOL: 6
ALCOHOL_USE: YES
TOTAL SCORE: 0

## 2024-05-24 ASSESSMENT — PATIENT HEALTH QUESTIONNAIRE - PHQ9
2. FEELING DOWN, DEPRESSED, IRRITABLE, OR HOPELESS: NOT AT ALL
1. LITTLE INTEREST OR PLEASURE IN DOING THINGS: NOT AT ALL
SUM OF ALL RESPONSES TO PHQ9 QUESTIONS 1 AND 2: 0

## 2024-05-24 ASSESSMENT — PAIN DESCRIPTION - PAIN TYPE
TYPE: ACUTE PAIN

## 2024-05-24 NOTE — ASSESSMENT & PLAN NOTE
5/25/2024  Including tobacco, methamphetamines  Strongly suggested to have lifelong cessation   PT Re-Evaluation     Today's date: 2024  Patient name: Nina Joseph  : 1979  MRN: [id number]  Referring provider: Greg Watt DO  Dx:   Encounter Diagnosis     ICD-10-CM    1. Chronic shoulder pain, unspecified laterality  M25.519     G89.29             Start Time: 1500  Stop Time: 1555  Total time in clinic (min): 55 minutes    Assessment  Assessment details: The patient is a 44 y.o. female presenting to Physical Therapy today with chronic R sided shoulder, neck, and elbow pain. Upon completion of the re-evaluation the patient is continuing to demonstrate with limitations in cervical mobility, R shoulder mobility, R shoulder strength, R elbow mobility, R elbow strength, scapulothoracic control, and pain. She is still having difficulty with functional activities such as lifting, overhead activity, pushing, pulling, and carrying due to symptomatology. She would continue to benefit from skilled Physical Therapy services to address functional limitations to return to prior level of function.   Impairments: abnormal muscle firing, abnormal muscle tone, abnormal or restricted ROM, abnormal movement, activity intolerance, impaired physical strength, lacks appropriate home exercise program, pain with function and poor posture     Symptom irritability: highUnderstanding of Dx/Px/POC: good   Prognosis: fair    Goals  ST.) Patient will initiate HEP Independently MET  2.) Patient will demonstrate improved R elbow strength evidenced by a 1-2 MMT grade increase Progressing   3.) Patient will demonstrate improved R shoulder strength evidenced by a 1-2 MMT grade increase Progressing   4.) Patient will demonstrate improved cervical mobility evidenced by </= minimal restriction in all planes of motion Progressing  5.) Patient will demonstrate a </= 7/10 pain level at its worse with activity Progressing    LT.) Patient will be d/c to an HEP independently Progressing  2.) Patient will improve functional  abilities evidenced by FOTO scores Progressing  3.) Patient will demonstrate a </= 4/10 pain level at its worse with activity Progressing  4.) Patient will demonstrate improved ability to lift, push, pull, and carry Progressing  5.) Return to PLOF Progressing    Plan  Plan details: Plan of care was reviewed and discussed thoroughly with the patient on their current condition. Patient was instructed on a HEP with written instructions. Patient is in agreement with PT recommendations and will attend Physical Therapy 2x/week for the next 4 weeks to address current deficits.    Patient would benefit from: skilled physical therapy  Referral necessary: No  Planned modality interventions: cryotherapy and thermotherapy: hydrocollator packs  Planned therapy interventions: flexibility, functional ROM exercises, graded activity, graded exercise, graded motor, home exercise program, manual therapy, joint mobilization, massage, neuromuscular re-education, patient education, postural training, strengthening, stretching, therapeutic activities, therapeutic exercise and therapeutic training  Frequency: 2x week  Duration in weeks: 4  Plan of Care beginning date: 1/23/2024  Plan of Care expiration date: 2/20/2024  Treatment plan discussed with: patient    Subjective Evaluation    History of Present Illness  Mechanism of injury: The patient reports today with chronic R sided shoulder, elbow, and neck pain. She notes intermittent numbness and tingling into the R arm. She states having a R elbow fracture when she was a child that did not heal correctly and is unable to bend/extend her elbow fully. She reports clicking/popping at times in the R shoulder when she moves the arm. She reports having a series of injections in the neck and R shoulder with no relief of symptoms. The patient states she is currently having difficulty with activities such as sleeping, lifting, pushing, pulling, and carrying due to pain.     Update 1-23-24: The  patient reports today with pain in the R shoulder. She notes since the start of Physical Therapy treatment she has been able to tolerate pain symptoms more than before. She states still having difficulty with sleeping, lifting, pushing, pulling, and carrying due to pain. She notes being scheduled for an MRI on 24 for the R shoulder.          Recurrent probem    Quality of life: fair    Patient Goals  Patient goals for therapy: decreased pain, increased motion, increased strength, independence with ADLs/IADLs and return to sport/leisure activities    Pain  Current pain ratin  At best pain ratin  At worst pain ratin  Location: R neck, shoulder, and elbow with radiating numbness and tingling into the hand.  Quality: burning, dull ache, sharp, throbbing and discomfort  Alleviating factors: Nothing.  Aggravating factors: lifting and overhead activity (pushing, pulling, carrying, lifting)      Diagnostic Tests  X-ray: abnormal  Treatments  Previous treatment: physical therapy and injection treatment  Current treatment: physical therapy    Objective     Concurrent Complaints  Positive for night pain and disturbed sleep. Negative for dizziness, faints, headaches, nausea/motion sickness and tinnitus    Postural Observations  Seated posture: poor  Standing posture: poor    Additional Postural Observation Details  Patient presents with a forward head, thoracic kyphosis, and rounded shoulder posture.     Palpation     Right   Hypertonic in the biceps, suboccipitals, subscapularis, supraspinatus and upper trapezius.   Tenderness of the biceps, suboccipitals, subscapularis, supraspinatus and upper trapezius.     Tenderness   Cervical Spine   No tenderness in the spinous process, left transverse process and right transverse process.     Neurological Testing     Sensation   Cervical/Thoracic   Left   Intact: light touch    Right   Hyposensation: light touch    Reflexes   Left   Deltoid (C5): normal  (2+)  Brachioradialis (C6): normal (2+)    Right   Deltoid (C5): normal (2+)  Brachioradialis (C6): normal (2+)    Active Range of Motion   Cervical/Thoracic Spine       Cervical    Flexion:  Restriction level: minimal  Extension:  with pain Restriction level: moderate  Left lateral flexion:  with pain Restriction level: moderate  Right lateral flexion:  with pain Restriction level moderate  Left rotation:  with pain Restriction level: moderate  Right rotation:  with pain Restriction level: moderate    Right Shoulder   Flexion: 55 degrees with pain  Abduction: 45 degrees with pain  External rotation 0°: 10 degrees   Internal rotation 0°: WFL    Additional Active Range of Motion Details  Patient demonstrates improvement in R shoulder mobility in all planes of motion     Passive Range of Motion     Right Shoulder   Flexion: 155 degrees with pain  Abduction: 140 degrees with pain  External rotation 0°: WFL  External rotation 90°: 25 degrees   Internal rotation 0°: WFL  Internal rotation 90°: 25 degrees     Scapular Mobility     Right Shoulder   Scapular mobility: poor  Scapular Mobility with Shoulder to 90° FF   Upward rotation: inadequate  Downward rotation: inadequate    Scapular Mobility beyond 90° FF   Upward rotation: inadequate  Downward rotation: inadequate    Strength/Myotome Testing   Cervical Spine     Left   Normal strength    Right Shoulder     Planes of Motion   Flexion: 2-   Extension: 2-   Abduction: 2-   External rotation at 0°: 2-   Internal rotation at 0°: 2-     Left Elbow   Normal strength    Right Elbow   Flexion: 2+  Extension: 2+    Additional Strength Details  Patient demonstrates improvements in R shoulder strength in all planes of motion.  Patient demonstrates improvements in R elbow strength     Tests   Cervical   Negative VBI.     Left   Negative Spurling's Test A.     Right   Negative Spurling's Test A.     Right Shoulder   Positive drop arm, empty can, full can, Hawkin's, Neer's and  "Speed's.   Neuro Exam:     Headaches   Patient reports headaches: No.            Precautions: Cervical Radiculopathy, Hx of R elbow fracture, CPS     Manuals 1/17 1/23 12/27 1/3 1/5 1/8 1/10 1/15   R Shoulder PROM  BM BM  CM BM BM  BM BM BM   R Elbow PROM  BM BM  CM BM BM  BM BM BM   STM R shoulder  CM      BM BM              Neuro Re-Ed           High Pull L2 2x10 L3 2x10     L2 2x10  L2 2x10  L2 2x10    Mid Pull L2 2x10 L3 2x10     L2 2x10  L2 2x10  L2 2x10                                                Ther Ex           Table Slides Flexion 10\"x10 10\"x10  10\" Holds x10  10\" Holds x10  10\" Holds x10  10\" Holds x10  10\" Holds x10 10\" Holds x10    Table Slides Scaption 10\"x10  10\"x10  10\" Holds x10 10\" Holds x10  10\" Holds x10  10\" Holds x10  10\" Holds x10  10\" Holds x10    Supine Wand Flexion  2x10 NT    2x10  2x10    Wall Slides X10  Flx AAROM NT       X8 Flx AAROM   Child Pose w/ PB           Cervical Ext w/ towel   X10          Cervical Rot w/ Towel            Standing Wand Abduction  2x10 2x10     2x10  2x10  2x10    Shoulder Shrugs  2# 2x10 2# 2x10   X20  X20  1# 2x10  1# 2x10  2# 2x10    AAROM Pulleys  3x10 3x10   2x10  2x10  3x10  3x10  3x10   CC DRE           Machine Retractions            HEP Instruction           Ther Activity                                 Gait Training                                 Modalities           Ice    10' Post TE Decline       MHP 10' pre-exercise  10' pre-exercise     10' Pre-exercise 10' Pre-exercise  10' Pre-exercise             "

## 2024-05-24 NOTE — CARE PLAN
"  Problem: Knowledge Deficit - Standard  Goal: Patient and family/care givers will demonstrate understanding of plan of care, disease process/condition, diagnostic tests and medications  Outcome: Progressing     Problem: Skin Integrity  Goal: Skin integrity is maintained or improved  Outcome: Progressing     Problem: Fall Risk  Goal: Patient will remain free from falls  Outcome: Progressing     Problem: Pain - Standard  Goal: Alleviation of pain or a reduction in pain to the patient’s comfort goal  Outcome: Progressing   The patient is Watcher - Medium risk of patient condition declining or worsening    Shift Goals  Clinical Goals: SBP< 135  Patient Goals: comfort, go home  Family Goals: STEVE no family present    Progress made toward(s) clinical / shift goals:   Patient transferred from TICU, pain was managed with prn medication, discussed plan of care and patient verbalized understanding, ordered Bariatric bed for patient's comfort and needs, call light within reach bed alarm kept on,/76   Pulse 60   Temp 36.5 °C (97.7 °F) (Temporal)   Resp 18   Ht 1.778 m (5' 10\")   Wt (!) 156 kg (344 lb 2.2 oz)   SpO2 95%       Patient is not progressing towards the following goals:      "

## 2024-05-24 NOTE — CARE PLAN
The patient is Watcher - Medium risk of patient condition declining or worsening    Shift Goals  Clinical Goals: Hemodynamic stability, SBP less than 135.  Patient Goals: Rest, comfort.  Family Goals: STEVE    Progress made toward(s) clinical / shift goals:      Problem: Knowledge Deficit - Standard  Goal: Patient and family/care givers will demonstrate understanding of plan of care, disease process/condition, diagnostic tests and medications  Outcome: Progressing     Problem: Skin Integrity  Goal: Skin integrity is maintained or improved  Outcome: Progressing     Problem: Fall Risk  Goal: Patient will remain free from falls  Outcome: Progressing     Problem: Pain - Standard  Goal: Alleviation of pain or a reduction in pain to the patient’s comfort goal  Outcome: Progressing

## 2024-05-24 NOTE — CARE PLAN
The patient is Stable - Low risk of patient condition declining or worsening    Shift Goals  Clinical Goals: SBP< 135  Patient Goals: comfort, go home  Family Goals: STEVE no family present    Progress made toward(s) clinical / shift goals:  SBP< 135     Patient is not progressing towards the following goals: n/a      Problem: Safety - Medical Restraint  Goal: Remains free of injury from restraints (Restraint for Interference with Medical Device)  Outcome: Met  Goal: Free from restraint(s) (Restraint for Interference with Medical Device)  Outcome: Met     Problem: Pain - Standard  Goal: Alleviation of pain or a reduction in pain to the patient’s comfort goal  Outcome: Progressing

## 2024-05-24 NOTE — CARE PLAN
Problem: Bronchoconstriction  Goal: Improve in air movement and diminished wheezing  Description: Target End Date:  2 to 3 days    1.  Implement inhaled treatments  2.  Evaluate and manage medication effects  Outcome: Progressing       Respiratory Update    QID DUO QID Flutter    Pt tolerating current treatments well with no adverse reactions.

## 2024-05-24 NOTE — PROGRESS NOTES
VASCULAR SURGERY PROGRESS NOTE    Awake, no complaints.  Off IV blood pressure medication.  Tolerating diet.  Denies leg pain.  AF, 30, 131/71.    General: Overweight male in nonapparent distress.  Abdomen, soft, nondistended, nontender, protuberant.  Lower extremities: Groin incisions intact.  Feet are warm, well-perfused.    Labs: Reviewed.    Assessment: Status post stent graft repair of Otilio type B aortic dissection, left iliacl stenting, and left to right femoral-femoral bypass.    Plan:  Doing well from vascular standpoint.  Okay to be transferred to floor to hospitalist service to manage his medical issues.  Vascular service will continue to follow.    Discussed with patient.  All questions were answered.    Discussed with RN.    Appreciate surgical critical critical care service's management of this patient.

## 2024-05-24 NOTE — PROGRESS NOTES
Trauma / Surgical Daily Progress Note    Date of Service  5/24/2024    Chief Complaint  53 y.o. male admitted 5/21/2024 for aortic dissection.     POD 3 emergent endovascular repair of descending thoracic aortic type B dissection, left iliacl stenting, and left to right femoral-femoral bypass.     Interval Events  Meeting BP parameters of SBP <140 with oral antihypertensives.   Has been off Esmolol for > 24 hours.   Up to chair at bedside.     - Advance bowel protocol.   - Medically cleared for transfer to [facility].   - Appreciate medicine service assumption of primary care.   - Vascular service following.     Review of Systems  Review of Systems   Constitutional:  Negative for chills and fever.   Respiratory:  Negative for shortness of breath.    Cardiovascular:  Negative for chest pain, palpitations and leg swelling.   Gastrointestinal:         BM PTA        Vital Signs  Temp:  [36.5 °C (97.7 °F)] 36.5 °C (97.7 °F)  Pulse:  [62-71] 70  Resp:  [10-31] 20  BP: (100-132)/(54-76) 130/76  SpO2:  [95 %-100 %] 96 %    Physical Exam  Physical Exam  Vitals and nursing note reviewed.   Constitutional:       General: He is not in acute distress.     Appearance: He is not ill-appearing.   HENT:      Head: Normocephalic and atraumatic.      Mouth/Throat:      Mouth: Mucous membranes are moist.   Eyes:      Extraocular Movements: Extraocular movements intact.   Cardiovascular:      Rate and Rhythm: Normal rate.   Pulmonary:      Effort: Pulmonary effort is normal. No respiratory distress.   Abdominal:      General: Abdomen is protuberant.   Musculoskeletal:      Cervical back: Normal range of motion.      Comments: Groin incisions well healing and approximated with sutures    Skin:     General: Skin is warm and dry.   Neurological:      Mental Status: He is alert and oriented to person, place, and time.         Laboratory  Recent Results (from the past 24 hour(s))   MAGNESIUM    Collection Time: 05/24/24  4:53 AM   Result Value  Ref Range    Magnesium 2.4 1.5 - 2.5 mg/dL   PHOSPHORUS    Collection Time: 05/24/24  4:53 AM   Result Value Ref Range    Phosphorus 3.7 2.5 - 4.5 mg/dL   Comp Metabolic Panel    Collection Time: 05/24/24  4:53 AM   Result Value Ref Range    Sodium 131 (L) 135 - 145 mmol/L    Potassium 4.9 3.6 - 5.5 mmol/L    Chloride 98 96 - 112 mmol/L    Co2 22 20 - 33 mmol/L    Anion Gap 11.0 7.0 - 16.0    Glucose 88 65 - 99 mg/dL    Bun 43 (H) 8 - 22 mg/dL    Creatinine 1.71 (H) 0.50 - 1.40 mg/dL    Calcium 8.6 8.5 - 10.5 mg/dL    Correct Calcium 9.6 8.5 - 10.5 mg/dL    AST(SGOT) 32 12 - 45 U/L    ALT(SGPT) 35 2 - 50 U/L    Alkaline Phosphatase 66 30 - 99 U/L    Total Bilirubin 1.5 0.1 - 1.5 mg/dL    Albumin 2.8 (L) 3.2 - 4.9 g/dL    Total Protein 5.8 (L) 6.0 - 8.2 g/dL    Globulin 3.0 1.9 - 3.5 g/dL    A-G Ratio 0.9 g/dL   CBC without Differential    Collection Time: 05/24/24  4:53 AM   Result Value Ref Range    WBC 10.9 (H) 4.8 - 10.8 K/uL    RBC 4.14 (L) 4.70 - 6.10 M/uL    Hemoglobin 13.7 (L) 14.0 - 18.0 g/dL    Hematocrit 41.4 (L) 42.0 - 52.0 %    .0 (H) 81.4 - 97.8 fL    MCH 33.1 (H) 27.0 - 33.0 pg    MCHC 33.1 32.3 - 36.5 g/dL    RDW 53.1 (H) 35.9 - 50.0 fL    Platelet Count 120 (L) 164 - 446 K/uL    MPV 10.7 9.0 - 12.9 fL   ESTIMATED GFR    Collection Time: 05/24/24  4:53 AM   Result Value Ref Range    GFR (CKD-EPI) 47 (A) >60 mL/min/1.73 m 2       Fluids    Intake/Output Summary (Last 24 hours) at 5/24/2024 1257  Last data filed at 5/24/2024 1200  Gross per 24 hour   Intake 260.47 ml   Output 1875 ml   Net -1614.53 ml       Core Measures & Quality Metrics  Radiology images reviewed, Labs reviewed and Medications reviewed  Coker catheter: No Coker      DVT Prophylaxis: Enoxaparin (Lovenox)  DVT prophylaxis - mechanical: SCDs          Assessment/Plan  * Aortic dissection (HCC)- (present on admission)  Assessment & Plan  Type B dissection.  5/21 Emergent endovascular repair of descending thoracic aortic type B  dissection with stent graft, angioplasty and self-expanding stent placement right external iliac artery and stenting of left common iliac artery.  Gagandeep Kay MD [facility].    Hypertension- (present on admission)  Assessment & Plan  Esmolol and nicardipine drips after surgery.  5/23 Esmolol discontinued.   5/24 SBP parameter <140 mmHg maintained with oral antihypertensives.     No contraindication to deep vein thrombosis (DVT) prophylaxis- (present on admission)  Assessment & Plan  5/24 Prophylactic dose enoxaparin 40 mg BID initiated.       Discussed patient condition with Hospitalist, Patient, trauma surgery, and ICU rounds . Mohit Duran MD

## 2024-05-24 NOTE — CONSULTS
Hospital Medicine Consultation    Date of Service  5/24/2024    Referring Physician  Gagandeep Arnett M.D.    Consulting Physician  Narendra Bryant M.D.    Reason for Consultation  Hospital medicine consultation requested and the patient admitted with a acute descending thoracic aortic dissection    History of Presenting Illness  53 y.o. male who presented 5/21/2024 with history of what appears to be untreated hypertension, substance abuse, morbid obesity, presenting to the facility after he developed acute onset of severe back pain, right leg pain, nausea, vomiting, [place] [facility], reportedly EMS was activated and unfortunately patient was unable to be situated with the helicopter, he therefore was in the field medicated, and transferred to this facility with the suspicion of possible vascular catastrophe, the patient reportedly has an extensive history of smoking, he tested positive for methamphetamines, the patient was immediately seen by vascular surgery on arrival and he was diagnosed with an acute complicated type B aortic dissection, right lower extremity ischemia, occluded celiac artery, severe hypertension initially.  The patient was emergently taken to the operating room, and underwent a emergent endovascular repair of a descending thoracic aortic B type dissection with the placement of a 40 mm x 15 cm thoracic aortic stent graft, exposure of bilateral femoral arteries, angioplasty and self-expanding stent placement in the right external iliac artery, angioplasty and stenting of the left common iliac artery using a 9 mm x 25 mm balloon expandable stent, left to right femoral to femoral artery bypass using a 7 mm dacryon graft.  Following the patient was admitted to the ICU level of care for postoperative care with the trauma surgeons, and vascular surgery.  On 5/24 the patient deemed stabilized to transfer out of the ICU to the telemetry unit, his  blood pressure primarily was controlled, the patient is complaining of back pain and incisional pain.  The patient's bilateral lower extremities are warm, perfusing well, groin incisions are intact, the patient is seen by vascular surgery today.  The patient was titrated off esmolol for greater than 24 hours.  He is complaining of some obstipation, he has not had much to eat yet, he is on a bowel protocol  Patient is currently afebrile, he is having heart rate in the 60s and 70s, respiration unlabored, he is on 2 L saturating in mid 90s, blood pressure currently between 100-130/70    Review of Systems  Review of Systems   Constitutional:  Positive for malaise/fatigue.   HENT: Negative.     Eyes: Negative.    Respiratory: Negative.     Cardiovascular:  Positive for leg swelling.   Gastrointestinal:  Positive for constipation.   Genitourinary: Negative.    Musculoskeletal:  Positive for back pain and myalgias.   Skin: Negative.    Neurological:  Positive for weakness.   Endo/Heme/Allergies: Negative.    Psychiatric/Behavioral:  Positive for substance abuse. The patient is nervous/anxious.    All other systems reviewed and are negative.      Past Medical History  Hypertension  Morbid obesity, BMI 49  Substance abuse  Tobacco abuse    Surgical History   has a past surgical history that includes abdominal aortic aneurysm (Bilateral, 5/21/2024); echocardiogram, transesophageal, intraoperative (N/A, 5/21/2024); and femoral femoral bypass (Bilateral, 5/21/2024).    Family History  Patient denies contributory family history    Social History  The patient is currently unemployed  The patient lives in [facility]  He admits to longstanding cigarette smoking as well as methamphetamine abuse      Medications  None       Allergies  No Known Allergies    Physical Exam  Temp:  [36.5 °C (97.7 °F)-36.7 °C (98.1 °F)] 36.7 °C (98.1 °F)  Pulse:  [60-73] 73  Resp:  [10-30] 18  BP: (106-132)/(57-76) 108/71  SpO2:  [95 %-100 %] 96  %    Physical Exam  Vitals and nursing note reviewed.   Constitutional:       Appearance: He is well-developed. He is obese. He is ill-appearing.   HENT:      Head: Normocephalic.   Eyes:      Pupils: Pupils are equal, round, and reactive to light.   Neck:      Comments: Large neck circumference  Cardiovascular:      Rate and Rhythm: Normal rate and regular rhythm.      Heart sounds: Murmur heard.   Pulmonary:      Effort: Pulmonary effort is normal.      Breath sounds: Rhonchi present.   Abdominal:      General: Bowel sounds are normal.      Palpations: Abdomen is soft.      Comments: Protuberant abdomen   Genitourinary:     Penis: Normal.       Rectum: Normal.   Musculoskeletal:         General: Normal range of motion.      Cervical back: Normal range of motion.      Right lower leg: Edema present.      Left lower leg: Edema present.   Skin:     General: Skin is warm.   Neurological:      Mental Status: He is alert and oriented to person, place, and time.      Motor: Weakness present.         Fluids  Date 05/24/24 0700 - 05/25/24 0659   Shift 2066-6352 7810-7466 1034-6375 24 Hour Total   INTAKE   P.O. 400   400   Shift Total 400   400   OUTPUT   Urine 650 250  900   Shift Total 650 250  900   Weight (kg) 156.1 156.1 156.1 156.1       Laboratory  Recent Labs     05/22/24  0430 05/23/24  0530 05/24/24  0453   WBC 13.4* 13.5* 10.9*   RBC 4.58* 4.12* 4.14*   HEMOGLOBIN 15.0 13.8* 13.7*   HEMATOCRIT 46.6 41.3* 41.4*   .7* 100.2* 100.0*   MCH 32.8 33.5* 33.1*   MCHC 32.2* 33.4 33.1   RDW 55.0* 54.2* 53.1*   PLATELETCT 113* 100* 120*   MPV 10.1 10.0 10.7     Recent Labs     05/22/24  0430 05/23/24  0530 05/24/24  0453   SODIUM 135 131* 131*   POTASSIUM 5.3 5.4 4.9   CHLORIDE 103 101 98   CO2 22 20 22   GLUCOSE 111* 94 88   BUN 31* 37* 43*   CREATININE 2.30* 1.93* 1.71*   CALCIUM 8.0* 8.2* 8.6                     Imaging  DX-CHEST-PORTABLE (1 VIEW)   Final Result         1.  Pulmonary edema and/or infiltrates are  identified, which are stable since the prior exam.   2.  Trace left pleural effusion, stable   3.  Cardiomegaly      DX-CHEST-PORTABLE (1 VIEW)   Final Result         1.  Pulmonary edema and/or infiltrates are identified, which are stable since the prior exam.   2.  Trace left pleural effusion   3.  Cardiomegaly      DX-CHEST-PORTABLE (1 VIEW)   Final Result         1.  Mild pulmonary edema and/or infiltrates.   2.  Cardiomegaly      DX-ABDOMEN FOR TUBE PLACEMENT   Final Result         1.  Nonspecific bowel gas pattern in the upper abdomen.   2.  Nasogastric tube tip terminates overlying the expected location of the gastric antrum.   3.  Hazy left lower lobe infiltrate.   4.  Small left pleural effusion      DX-CHEST-LIMITED (1 VIEW)   Final Result         1. Appropriately positioned endotracheal tube and right internal jugular central venous access catheter.   2. No postprocedure visible pneumothorax.   3. Lingular atelectasis with small left pleural effusion.      EC-WILFREDO W/O CONT   Final Result      CT-LOWER EXTREMITY BILATERAL WITH CONTRAST   Final Result      1.  Aortic dissection extending into the right common iliac and right external iliac arteries with associated thrombus      2.  Reconstitution at the level of the common femoral and superficial femoral arteries      3.  At the level of the mid thigh there is lack of detectable flow within the superficial femoral artery      4.  Lack of detectable flow within the popliteal artery or the arteries of the right calf      CT-CTA AORTA-RO WITH & W/O-POST PROCESS   Final Result      1.  Otilio type B aortic dissection      2.  Importantly, there is thrombus filling the celiac axis and there is distal flow that is likely reconstitution.      3.  The renal arteries are patent.      4.  There is thrombosis/occlusion of the right common iliac artery and right external iliac artery extending to the femoral artery. The visualized femoral artery is patent.      5.   Findings were discussed with TROY BALL on 5/21/2024 10:38 AM.      IR-THORACIC AORTOGRAM    (Results Pending)       Assessment/Plan  * Aortic dissection (HCC)- (present on admission)  Assessment & Plan  Type B dissection.  5/21 Emergent endovascular repair of descending thoracic aortic type B dissection with stent graft, angioplasty and self-expanding stent placement right external iliac artery and stenting of left common iliac artery.  Gagandeep Arnett MD [facility].    MAIDA (acute kidney injury) (HCC)  Assessment & Plan  Unclear prior baseline  Check urinalysis, renal ultrasound  Avoid nephrotoxins, monitor closely    Dissection of aorta (HCC)- (present on admission)  Assessment & Plan  S/p surgical graft repair  Vascular surgery following  Required reconstruction of the common femoral artery system      Morbid obesity due to excess calories (HCC)  Assessment & Plan  Future graduated weight loss program suggested  Lifestyle modification    Thrombocytopenia (HCC)  Assessment & Plan  Mild, likely related to the patient's vascular injury  Monitor    Substance abuse (HCC)- (present on admission)  Assessment & Plan  Including tobacco, methamphetamines  Strongly suggested to have lifelong cessation    Hypertension- (present on admission)  Assessment & Plan  Previously untreated, possibly longer standing  Esmolol and nicardipine drips after surgery have been titrated off.   5/24 SBP parameter <140 mmHg maintained with oral antihypertensives.   Echocardiogram shows a ejection fraction of 55%    Plan  5/24  Continue diligent blood pressure management including beta-blockade, Norvasc,  Evaluate for renal insufficiency, urinalysis, renal ultrasound  Follow-up labs closely  Mobilization, pain control  The patient apparently with VA benefits, arrange for close outpatient follow-up  See orders  Patient is has a high medical complexity, complex decision making and is at high risk for complication, morbidity, and  mortality.  I spent 65 minutes, reviewing the chart, obtaining and/or reviewing separately obtained history. Performing a medically appropriate examination and evaluation.  Counseling and educating the patient. Ordering and reviewing medications, tests, or procedures.   Documenting clinical information in EPIC. Independently interpreting results and communicating results to patient. Discussing future disposition of care with patient, RN and case management.    Thank you for consulting with us, we will follow along closely while the patient is hospitalized      Please note that this dictation was created using voice recognition software. I have made every reasonable attempt to correct obvious errors, but I expect that there are errors of grammar and possibly context that I did not discover before finalizing the note.

## 2024-05-24 NOTE — ASSESSMENT & PLAN NOTE
5/25/2024  Previously untreated, possibly longer standing  Esmolol and nicardipine drips after surgery have been titrated off.   5/24 SBP parameter <140 mmHg maintained with oral antihypertensives.   Echocardiogram shows a ejection fraction of 55%

## 2024-05-24 NOTE — ASSESSMENT & PLAN NOTE
5/25/2024  Type B dissection.  5/21 Emergent endovascular repair of descending thoracic aortic type B dissection with stent graft, angioplasty and self-expanding stent placement right external iliac artery and stenting of left common iliac artery.  Gagandeep Arnett MD [facility].

## 2024-05-24 NOTE — ASSESSMENT & PLAN NOTE
5/25/2024  S/p surgical graft repair  Vascular surgery following  Required reconstruction of the common femoral artery system

## 2024-05-24 NOTE — PROGRESS NOTES
4 Eyes Skin Assessment Completed by BEATA Rivera and MIRTA Willoughby.    Head WDL  Ears WDL  Nose WDL  Mouth WDL  Neck WDL  Breast/Chest WDL  Shoulder Blades WDL  Spine WDL  (R) Arm/Elbow/Hand Redness and Blanching  (L) Arm/Elbow/Hand Redness, Blanching, and Abrasion  Abdomen WDL  Groin Bilateral groin sites, bruising on swollen testicles, redness and excoriation   Scrotum/Coccyx/Buttocks Redness and Blanching  (R) Leg Shiny and Edema scab on knee  (L) Leg Shiny and Edema abrasion lateral to knee  (R) Heel/Foot/Toe Redness, Blanching, and Boggy  (L) Heel/Foot/Toe Redness, Blanching, and Boggy                                    Devices In Places Tele Box, Pulse Ox, and Nasal Cannula      Interventions In Place NC W/Ear Foams and Pillows    Possible Skin Injury Yes    Pictures Uploaded Into Epic Yes  Wound Consult Placed N/A  RN Wound Prevention Protocol Ordered No

## 2024-05-24 NOTE — ASSESSMENT & PLAN NOTE
5/25/2024  Unclear prior baseline  Check urinalysis, renal ultrasound  Avoid nephrotoxins, monitor closely

## 2024-05-25 ENCOUNTER — APPOINTMENT (OUTPATIENT)
Dept: RADIOLOGY | Facility: MEDICAL CENTER | Age: 54
DRG: 219 | End: 2024-05-25
Payer: COMMERCIAL

## 2024-05-25 PROBLEM — E03.9 ACQUIRED HYPOTHYROIDISM: Status: ACTIVE | Noted: 2024-05-25

## 2024-05-25 LAB
ALBUMIN SERPL BCP-MCNC: 2.6 G/DL (ref 3.2–4.9)
ALBUMIN/GLOB SERPL: 0.9 G/DL
ALP SERPL-CCNC: 62 U/L (ref 30–99)
ALT SERPL-CCNC: 31 U/L (ref 2–50)
ANION GAP SERPL CALC-SCNC: 10 MMOL/L (ref 7–16)
AST SERPL-CCNC: 27 U/L (ref 12–45)
BASOPHILS # BLD AUTO: 0.4 % (ref 0–1.8)
BASOPHILS # BLD: 0.03 K/UL (ref 0–0.12)
BILIRUB SERPL-MCNC: 1.2 MG/DL (ref 0.1–1.5)
BUN SERPL-MCNC: 35 MG/DL (ref 8–22)
CALCIUM ALBUM COR SERPL-MCNC: 9.5 MG/DL (ref 8.5–10.5)
CALCIUM SERPL-MCNC: 8.4 MG/DL (ref 8.5–10.5)
CHLORIDE SERPL-SCNC: 101 MMOL/L (ref 96–112)
CO2 SERPL-SCNC: 21 MMOL/L (ref 20–33)
CREAT SERPL-MCNC: 1.27 MG/DL (ref 0.5–1.4)
EOSINOPHIL # BLD AUTO: 0.11 K/UL (ref 0–0.51)
EOSINOPHIL NFR BLD: 1.5 % (ref 0–6.9)
ERYTHROCYTE [DISTWIDTH] IN BLOOD BY AUTOMATED COUNT: 52.6 FL (ref 35.9–50)
GFR SERPLBLD CREATININE-BSD FMLA CKD-EPI: 68 ML/MIN/1.73 M 2
GLOBULIN SER CALC-MCNC: 3 G/DL (ref 1.9–3.5)
GLUCOSE SERPL-MCNC: 106 MG/DL (ref 65–99)
HCT VFR BLD AUTO: 39.4 % (ref 42–52)
HGB BLD-MCNC: 13.1 G/DL (ref 14–18)
IMM GRANULOCYTES # BLD AUTO: 0.11 K/UL (ref 0–0.11)
IMM GRANULOCYTES NFR BLD AUTO: 1.5 % (ref 0–0.9)
LYMPHOCYTES # BLD AUTO: 0.67 K/UL (ref 1–4.8)
LYMPHOCYTES NFR BLD: 8.9 % (ref 22–41)
MAGNESIUM SERPL-MCNC: 2.1 MG/DL (ref 1.5–2.5)
MCH RBC QN AUTO: 33.1 PG (ref 27–33)
MCHC RBC AUTO-ENTMCNC: 33.2 G/DL (ref 32.3–36.5)
MCV RBC AUTO: 99.5 FL (ref 81.4–97.8)
MONOCYTES # BLD AUTO: 0.97 K/UL (ref 0–0.85)
MONOCYTES NFR BLD AUTO: 12.9 % (ref 0–13.4)
NEUTROPHILS # BLD AUTO: 5.62 K/UL (ref 1.82–7.42)
NEUTROPHILS NFR BLD: 74.8 % (ref 44–72)
NRBC # BLD AUTO: 0 K/UL
NRBC BLD-RTO: 0 /100 WBC (ref 0–0.2)
NT-PROBNP SERPL IA-MCNC: 1529 PG/ML (ref 0–125)
PHOSPHATE SERPL-MCNC: 2.7 MG/DL (ref 2.5–4.5)
PLATELET # BLD AUTO: 123 K/UL (ref 164–446)
PMV BLD AUTO: 10.1 FL (ref 9–12.9)
POTASSIUM SERPL-SCNC: 4.4 MMOL/L (ref 3.6–5.5)
PROT SERPL-MCNC: 5.6 G/DL (ref 6–8.2)
RBC # BLD AUTO: 3.96 M/UL (ref 4.7–6.1)
SODIUM SERPL-SCNC: 132 MMOL/L (ref 135–145)
T4 FREE SERPL-MCNC: 0.83 NG/DL (ref 0.93–1.7)
TSH SERPL DL<=0.005 MIU/L-ACNC: 7.41 UIU/ML (ref 0.38–5.33)
WBC # BLD AUTO: 7.5 K/UL (ref 4.8–10.8)

## 2024-05-25 PROCEDURE — 700111 HCHG RX REV CODE 636 W/ 250 OVERRIDE (IP): Mod: JZ | Performed by: SURGERY

## 2024-05-25 PROCEDURE — 71045 X-RAY EXAM CHEST 1 VIEW: CPT

## 2024-05-25 PROCEDURE — 84100 ASSAY OF PHOSPHORUS: CPT

## 2024-05-25 PROCEDURE — 94669 MECHANICAL CHEST WALL OSCILL: CPT

## 2024-05-25 PROCEDURE — A9270 NON-COVERED ITEM OR SERVICE: HCPCS | Performed by: INTERNAL MEDICINE

## 2024-05-25 PROCEDURE — 84439 ASSAY OF FREE THYROXINE: CPT

## 2024-05-25 PROCEDURE — 700102 HCHG RX REV CODE 250 W/ 637 OVERRIDE(OP): Performed by: INTERNAL MEDICINE

## 2024-05-25 PROCEDURE — A9270 NON-COVERED ITEM OR SERVICE: HCPCS | Performed by: SURGERY

## 2024-05-25 PROCEDURE — 83735 ASSAY OF MAGNESIUM: CPT

## 2024-05-25 PROCEDURE — 94640 AIRWAY INHALATION TREATMENT: CPT

## 2024-05-25 PROCEDURE — 36415 COLL VENOUS BLD VENIPUNCTURE: CPT

## 2024-05-25 PROCEDURE — 85025 COMPLETE CBC W/AUTO DIFF WBC: CPT

## 2024-05-25 PROCEDURE — 99233 SBSQ HOSP IP/OBS HIGH 50: CPT | Performed by: INTERNAL MEDICINE

## 2024-05-25 PROCEDURE — 700102 HCHG RX REV CODE 250 W/ 637 OVERRIDE(OP): Performed by: SURGERY

## 2024-05-25 PROCEDURE — 83880 ASSAY OF NATRIURETIC PEPTIDE: CPT

## 2024-05-25 PROCEDURE — 97116 GAIT TRAINING THERAPY: CPT

## 2024-05-25 PROCEDURE — 770020 HCHG ROOM/CARE - TELE (206)

## 2024-05-25 PROCEDURE — 80053 COMPREHEN METABOLIC PANEL: CPT

## 2024-05-25 PROCEDURE — 84443 ASSAY THYROID STIM HORMONE: CPT

## 2024-05-25 PROCEDURE — 700101 HCHG RX REV CODE 250: Performed by: SURGERY

## 2024-05-25 PROCEDURE — 97530 THERAPEUTIC ACTIVITIES: CPT

## 2024-05-25 RX ORDER — CARVEDILOL 12.5 MG/1
12.5 TABLET ORAL 2 TIMES DAILY WITH MEALS
Status: DISCONTINUED | OUTPATIENT
Start: 2024-05-25 | End: 2024-05-26 | Stop reason: HOSPADM

## 2024-05-25 RX ORDER — OXYCODONE HYDROCHLORIDE 10 MG/1
10 TABLET ORAL
Status: DISCONTINUED | OUTPATIENT
Start: 2024-05-25 | End: 2024-05-26 | Stop reason: HOSPADM

## 2024-05-25 RX ORDER — POLYETHYLENE GLYCOL 3350 17 G/17G
1 POWDER, FOR SOLUTION ORAL DAILY
Status: DISCONTINUED | OUTPATIENT
Start: 2024-05-25 | End: 2024-05-26 | Stop reason: HOSPADM

## 2024-05-25 RX ORDER — GABAPENTIN 100 MG/1
100 CAPSULE ORAL 3 TIMES DAILY
Status: DISCONTINUED | OUTPATIENT
Start: 2024-05-25 | End: 2024-05-26 | Stop reason: HOSPADM

## 2024-05-25 RX ORDER — AMOXICILLIN 250 MG
2 CAPSULE ORAL 2 TIMES DAILY
Status: DISCONTINUED | OUTPATIENT
Start: 2024-05-25 | End: 2024-05-26 | Stop reason: HOSPADM

## 2024-05-25 RX ORDER — CYCLOBENZAPRINE HCL 10 MG
10 TABLET ORAL 3 TIMES DAILY PRN
Status: DISCONTINUED | OUTPATIENT
Start: 2024-05-25 | End: 2024-05-26

## 2024-05-25 RX ORDER — OXYCODONE HYDROCHLORIDE 5 MG/1
5 TABLET ORAL
Status: DISCONTINUED | OUTPATIENT
Start: 2024-05-25 | End: 2024-05-26 | Stop reason: HOSPADM

## 2024-05-25 RX ORDER — LEVOTHYROXINE SODIUM 0.1 MG/1
100 TABLET ORAL
Status: DISCONTINUED | OUTPATIENT
Start: 2024-05-25 | End: 2024-05-26 | Stop reason: HOSPADM

## 2024-05-25 RX ORDER — FAMOTIDINE 20 MG/1
20 TABLET, FILM COATED ORAL DAILY
Status: DISCONTINUED | OUTPATIENT
Start: 2024-05-26 | End: 2024-05-26 | Stop reason: HOSPADM

## 2024-05-25 RX ORDER — AMLODIPINE BESYLATE 10 MG/1
10 TABLET ORAL
Status: DISCONTINUED | OUTPATIENT
Start: 2024-05-26 | End: 2024-05-26 | Stop reason: HOSPADM

## 2024-05-25 RX ADMIN — ENOXAPARIN SODIUM 40 MG: 100 INJECTION SUBCUTANEOUS at 05:04

## 2024-05-25 RX ADMIN — OXYCODONE HYDROCHLORIDE 10 MG: 10 TABLET ORAL at 05:09

## 2024-05-25 RX ADMIN — CARVEDILOL 12.5 MG: 12.5 TABLET, FILM COATED ORAL at 09:21

## 2024-05-25 RX ADMIN — FAMOTIDINE 20 MG: 20 TABLET, FILM COATED ORAL at 05:03

## 2024-05-25 RX ADMIN — LEVOTHYROXINE SODIUM 100 MCG: 0.1 TABLET ORAL at 09:24

## 2024-05-25 RX ADMIN — IPRATROPIUM BROMIDE AND ALBUTEROL SULFATE 3 ML: 2.5; .5 SOLUTION RESPIRATORY (INHALATION) at 07:32

## 2024-05-25 RX ADMIN — GABAPENTIN 100 MG: 100 CAPSULE ORAL at 17:22

## 2024-05-25 RX ADMIN — GABAPENTIN 100 MG: 100 CAPSULE ORAL at 05:03

## 2024-05-25 RX ADMIN — OXYCODONE HYDROCHLORIDE 10 MG: 10 TABLET ORAL at 19:43

## 2024-05-25 RX ADMIN — POLYETHYLENE GLYCOL 3350 1 PACKET: 17 POWDER, FOR SOLUTION ORAL at 11:27

## 2024-05-25 RX ADMIN — AMLODIPINE BESYLATE 10 MG: 10 TABLET ORAL at 05:03

## 2024-05-25 RX ADMIN — OXYCODONE HYDROCHLORIDE 10 MG: 10 TABLET ORAL at 16:42

## 2024-05-25 RX ADMIN — Medication 1 APPLICATOR: at 05:04

## 2024-05-25 RX ADMIN — ENOXAPARIN SODIUM 40 MG: 100 INJECTION SUBCUTANEOUS at 16:44

## 2024-05-25 RX ADMIN — IPRATROPIUM BROMIDE AND ALBUTEROL SULFATE 3 ML: 2.5; .5 SOLUTION RESPIRATORY (INHALATION) at 10:36

## 2024-05-25 RX ADMIN — Medication 1 APPLICATOR: at 18:00

## 2024-05-25 RX ADMIN — OXYCODONE HYDROCHLORIDE 10 MG: 10 TABLET ORAL at 09:21

## 2024-05-25 RX ADMIN — CARVEDILOL 12.5 MG: 12.5 TABLET, FILM COATED ORAL at 16:44

## 2024-05-25 RX ADMIN — GABAPENTIN 100 MG: 100 CAPSULE ORAL at 11:27

## 2024-05-25 RX ADMIN — SENNOSIDES AND DOCUSATE SODIUM 2 TABLET: 50; 8.6 TABLET ORAL at 17:21

## 2024-05-25 ASSESSMENT — FIBROSIS 4 INDEX: FIB4 SCORE: 2.34

## 2024-05-25 ASSESSMENT — PATIENT HEALTH QUESTIONNAIRE - PHQ9
2. FEELING DOWN, DEPRESSED, IRRITABLE, OR HOPELESS: NOT AT ALL
SUM OF ALL RESPONSES TO PHQ9 QUESTIONS 1 AND 2: 0
1. LITTLE INTEREST OR PLEASURE IN DOING THINGS: NOT AT ALL

## 2024-05-25 ASSESSMENT — ENCOUNTER SYMPTOMS
CONSTIPATION: 1
BACK PAIN: 1
COUGH: 1
NERVOUS/ANXIOUS: 1

## 2024-05-25 ASSESSMENT — PAIN DESCRIPTION - PAIN TYPE
TYPE: ACUTE PAIN

## 2024-05-25 ASSESSMENT — GAIT ASSESSMENTS
ASSISTIVE DEVICE: FRONT WHEEL WALKER
DISTANCE (FEET): 15
DEVIATION: BRADYKINETIC
GAIT LEVEL OF ASSIST: STANDBY ASSIST

## 2024-05-25 ASSESSMENT — LIFESTYLE VARIABLES: SUBSTANCE_ABUSE: 1

## 2024-05-25 NOTE — PROGRESS NOTES
NOC HOSPITALIST CROSS COVER    Notified by RN regarding new onset of upper back pain that is reproducible.  Patient is postop day 4 for descending thoracic aortic aneurysm repair.  RN advised to obtain bilateral blood pressures no significant difference between arms did not exceed the 20 mmHg threshold.  Chest x-ray which was obtained, mediastinal with unchanged.  Continue with as needed pain meds.        -----------------------------------------------------------------------------------------------------------    Electronically signed by:  ISAAC Park PA-C  Hospitalist Services

## 2024-05-25 NOTE — CARE PLAN
The patient is Stable - Low risk of patient condition declining or worsening    Shift Goals  Clinical Goals: SBP< 135  Patient Goals: comfort, go home  Family Goals: STEVE no family present    Progress made toward(s) clinical / shift goals:  Progressing      Problem: Knowledge Deficit - Standard  Goal: Patient and family/care givers will demonstrate understanding of plan of care, disease process/condition, diagnostic tests and medications  Outcome: Progressing  Note: Pt verbalizes understanding of plan of care     Problem: Fall Risk  Goal: Patient will remain free from falls  Outcome: Progressing  Note: Pt wearing treaded slipper socks, frame alarm in use for bariatric bed, call light within reach

## 2024-05-25 NOTE — PROGRESS NOTES
Monitor Summary  Rhythm: SR  Rate: 58-75  Ectopy: OCCASSIONAL PVCSFPAV  .15 / .09 / .39

## 2024-05-25 NOTE — PROGRESS NOTES
VASCULAR SURGERY PROGRESS NOTE      Awake, no complaints.  Tolerating diet.  Denies leg pain.  AF, 63, 121/72     General: Overweight male in nonapparent distress.  Abdomen, soft, nondistended, nontender, protuberant.  Lower extremities: Groin incisions intact, no erythema.  Feet are warm, well-perfused.     Labs: Reviewed.     Assessment: Status post stent graft repair of Otilio type B aortic dissection, left iliac stenting, and left to right femoral-femoral bypass.     Plan:  Doing well from vascular standpoint.    Okay to be discharged home from vascular standpoint.  Follow-up with Dr. Gagandeep Arnett in 2 weeks.    Discussed with patient.  All questions were answered.        Appreciate hospitalist's management of this patient.    Vascular service will sign off.  Please call for questions or concerns.

## 2024-05-25 NOTE — PROGRESS NOTES
Hospital Medicine Daily Progress Note    Date of Service  5/25/2024    Chief Complaint  Rene Kelley is a 53 y.o. male admitted 5/21/2024 with   Chief Complaint   Patient presents with    Back Pain     Mid back pain associated with RLE pain / numbness sudden onset 03:00am today    Leg Pain     Decreased cap refill BLE         Hospital Course  No notes on file    53 y.o. male who presented 5/21/2024 with history of what appears to be untreated hypertension, substance abuse, morbid obesity, presenting to the facility after he developed acute onset of severe back pain, right leg pain, nausea, vomiting, [place] [facility]. Reportedly EMS was activated and unfortunately patient was unable to be situated with the helicopter, he therefore was in the field medicated, and transferred to this facility with the suspicion of possible vascular catastrophe.  The patient reportedly has an extensive history of smoking.  He tested positive for methamphetamines.  The patient was immediately seen by vascular surgery on arrival and he was diagnosed with an acute complicated type B aortic dissection, right lower extremity ischemia, occluded celiac artery, severe hypertension initially.  The patient was emergently taken to the operating room, and underwent a emergent endovascular repair of a descending thoracic aortic B type dissection with the placement of a 40 mm x 15 cm thoracic aortic stent graft, exposure of bilateral femoral arteries, angioplasty and self-expanding stent placement in the right external iliac artery, angioplasty and stenting of the left common iliac artery using a 9 mm x 25 mm balloon expandable stent, left to right femoral to femoral artery bypass using a 7 mm dacryon graft.  Following the patient was admitted to the ICU level of care for postoperative care with the trauma surgeons, and vascular surgery.  Patient initially required nicardipine and esmolol  drips for blood pressure control.  He was able to be transition to oral amlodipine and carvedilol.  On 5/24 the patient deemed stabilized to transfer out of the ICU to the telemetry unit, his blood pressure primarily was controlled, the patient is complaining of back pain and incisional pain.    Patient did have an acute kidney injury.  Renal ultrasound showed no hydronephrosis.  No renal calculus.  Kidney function improved with fluid resuscitation.  Patient was also found to have hypothyroidism and started on levothyroxine.    The patient's bilateral lower extremities are warm, perfusing well, groin incisions are intact, the patient is seen by vascular surgery today.  The patient was titrated off esmolol for greater than 24 hours.  He is complaining of some obstipation, he has not had much to eat yet, he is on a bowel protocol  Patient is currently afebrile, he is having heart rate in the 60s and 70s, respiration unlabored, he is on 2 L saturating in mid 90s, blood pressure currently between 100-130/70    Interval Problem Update  Patient was seen and examined at bedside.  I have personally reviewed and interpreted vitals, labs, and imaging.    5/25.  Afebrile.  Blood pressure is controlled.  Intermittent tachypnea.  On 2-3 L nasal cannula.  Replete phos.  Patient also found to be hypothyroid.  Kidney function is improved.  Denies fever, chills, chest pains.  Overnight patient had some bad cough and congestion to increased back pain.  Pain is currently well-controlled.  Does report some constipation but is passing gas.  Wbc 10.9 > 7.5  Hgb 13.1  P 123  Cr 1.71 > 1.27     I have discussed this patient's plan of care and discharge plan at IDT rounds today with Case Management, Nursing, Nursing leadership, and other members of the IDT team.    Consultants/Specialty  vascular surgery    Code Status  Full Code    Disposition  The patient is not medically cleared for discharge to home or a post-acute facility.  Anticipate  discharge to: home with organized home healthcare and close outpatient follow-up    I have placed the appropriate orders for post-discharge needs.    Review of Systems  Review of Systems   Respiratory:  Positive for cough.    Gastrointestinal:  Positive for constipation.   Musculoskeletal:  Positive for back pain.   Psychiatric/Behavioral:  Positive for substance abuse. The patient is nervous/anxious.         Physical Exam  Temp:  [36.5 °C (97.7 °F)-36.9 °C (98.4 °F)] 36.5 °C (97.7 °F)  Pulse:  [60-77] 63  Resp:  [16-30] 19  BP: (108-143)/(66-81) 138/81  SpO2:  [90 %-97 %] 97 %    Physical Exam  Vitals and nursing note reviewed.   Constitutional:       Appearance: Normal appearance. He is obese. He is ill-appearing.   HENT:      Head: Normocephalic and atraumatic.      Nose: Nose normal.      Mouth/Throat:      Mouth: Mucous membranes are moist.      Pharynx: Oropharynx is clear.   Eyes:      Extraocular Movements: Extraocular movements intact.      Conjunctiva/sclera: Conjunctivae normal.   Cardiovascular:      Rate and Rhythm: Normal rate and regular rhythm.      Pulses: Normal pulses.      Heart sounds: Murmur heard.      No friction rub. No gallop.   Pulmonary:      Effort: Pulmonary effort is normal. Tachypnea present. No respiratory distress.      Breath sounds: Normal breath sounds. No wheezing or rales.   Chest:      Chest wall: No tenderness.   Abdominal:      General: Abdomen is flat. Bowel sounds are normal. There is no distension.      Palpations: Abdomen is soft. There is no mass.      Tenderness: There is no abdominal tenderness. There is no guarding.   Musculoskeletal:         General: Normal range of motion.      Cervical back: Normal range of motion and neck supple.      Right lower leg: Edema present.      Left lower leg: Edema present.   Skin:     General: Skin is warm.      Capillary Refill: Capillary refill takes less than 2 seconds.   Neurological:      General: No focal deficit present.       Mental Status: He is alert and oriented to person, place, and time. Mental status is at baseline.      Cranial Nerves: No cranial nerve deficit.      Motor: No weakness.   Psychiatric:         Mood and Affect: Mood normal.         Behavior: Behavior normal.         Thought Content: Thought content normal.         Judgment: Judgment normal.         Fluids    Intake/Output Summary (Last 24 hours) at 5/25/2024 0746  Last data filed at 5/25/2024 0600  Gross per 24 hour   Intake 2400 ml   Output 1375 ml   Net 1025 ml       Laboratory  Recent Labs     05/23/24  0530 05/24/24  0453 05/25/24  0332   WBC 13.5* 10.9* 7.5   RBC 4.12* 4.14* 3.96*   HEMOGLOBIN 13.8* 13.7* 13.1*   HEMATOCRIT 41.3* 41.4* 39.4*   .2* 100.0* 99.5*   MCH 33.5* 33.1* 33.1*   MCHC 33.4 33.1 33.2   RDW 54.2* 53.1* 52.6*   PLATELETCT 100* 120* 123*   MPV 10.0 10.7 10.1     Recent Labs     05/23/24 0530 05/24/24 0453 05/25/24  0332   SODIUM 131* 131* 132*   POTASSIUM 5.4 4.9 4.4   CHLORIDE 101 98 101   CO2 20 22 21   GLUCOSE 94 88 106*   BUN 37* 43* 35*   CREATININE 1.93* 1.71* 1.27   CALCIUM 8.2* 8.6 8.4*                   Imaging  DX-CHEST-PORTABLE (1 VIEW)   Final Result      No change. Stable left lower thoracic subsegmental atelectasis and small amount of left pleural fluid.      US-RENAL   Final Result      Limited exam.      1.  No hydronephrosis. No renal calculus.      DX-CHEST-PORTABLE (1 VIEW)   Final Result         1.  Pulmonary edema and/or infiltrates are identified, which are stable since the prior exam.   2.  Trace left pleural effusion, stable   3.  Cardiomegaly      DX-CHEST-PORTABLE (1 VIEW)   Final Result         1.  Pulmonary edema and/or infiltrates are identified, which are stable since the prior exam.   2.  Trace left pleural effusion   3.  Cardiomegaly      DX-CHEST-PORTABLE (1 VIEW)   Final Result         1.  Mild pulmonary edema and/or infiltrates.   2.  Cardiomegaly      DX-ABDOMEN FOR TUBE PLACEMENT   Final Result          1.  Nonspecific bowel gas pattern in the upper abdomen.   2.  Nasogastric tube tip terminates overlying the expected location of the gastric antrum.   3.  Hazy left lower lobe infiltrate.   4.  Small left pleural effusion      DX-CHEST-LIMITED (1 VIEW)   Final Result         1. Appropriately positioned endotracheal tube and right internal jugular central venous access catheter.   2. No postprocedure visible pneumothorax.   3. Lingular atelectasis with small left pleural effusion.      EC-WILFREDO W/O CONT   Final Result      CT-LOWER EXTREMITY BILATERAL WITH CONTRAST   Final Result      1.  Aortic dissection extending into the right common iliac and right external iliac arteries with associated thrombus      2.  Reconstitution at the level of the common femoral and superficial femoral arteries      3.  At the level of the mid thigh there is lack of detectable flow within the superficial femoral artery      4.  Lack of detectable flow within the popliteal artery or the arteries of the right calf      CT-CTA AORTA-RO WITH & W/O-POST PROCESS   Final Result      1.  Otilio type B aortic dissection      2.  Importantly, there is thrombus filling the celiac axis and there is distal flow that is likely reconstitution.      3.  The renal arteries are patent.      4.  There is thrombosis/occlusion of the right common iliac artery and right external iliac artery extending to the femoral artery. The visualized femoral artery is patent.      5.  Findings were discussed with TROY BALL on 5/21/2024 10:38 AM.      IR-THORACIC AORTOGRAM    (Results Pending)        Assessment/Plan  * Aortic dissection (HCC)- (present on admission)  Assessment & Plan  5/25/2024  Type B dissection.  5/21 Emergent endovascular repair of descending thoracic aortic type B dissection with stent graft, angioplasty and self-expanding stent placement right external iliac artery and stenting of left common iliac artery.  Gagandeep Arnett MD [facility].    Morbid obesity due to excess calories (HCC)  Assessment & Plan  5/25/2024  Future graduated weight loss program suggested  Lifestyle modification    MAIDA (acute kidney injury) (HCC)  Assessment & Plan  5/25/2024  Unclear prior baseline  Check urinalysis, renal ultrasound  Avoid nephrotoxins, monitor closely    Thrombocytopenia (HCC)  Assessment & Plan  5/25/2024  Mild, likely related to the patient's vascular injury  Monitor    Substance abuse (HCC)- (present on admission)  Assessment & Plan  5/25/2024  Including tobacco, methamphetamines  Strongly suggested to have lifelong cessation    Dissection of aorta (HCC)- (present on admission)  Assessment & Plan  5/25/2024  S/p surgical graft repair  Vascular surgery following  Required reconstruction of the common femoral artery system    Hypertension- (present on admission)  Assessment & Plan  5/25/2024  Previously untreated, possibly longer standing  Esmolol and nicardipine drips after surgery have been titrated off.   5/24 SBP parameter <140 mmHg maintained with oral antihypertensives.   Echocardiogram shows a ejection fraction of 55%         VTE prophylaxis: Lovenox    I have performed a physical exam and reviewed and updated ROS and Plan today (5/25/2024). In review of yesterday's note (5/24/2024), there are no changes except as documented above.    Greater than 52 minutes spent prepping to see patient (e.g. review of tests) obtaining and/or reviewing separately obtained history. Performing a medically appropriate examination and/ evaluation.  Counseling and educating the patient/family/caregiver.  Ordering medications, tests, or procedures.  Referring and communicating with other health care professionals.  Documenting clinical information in EPIC.  Independently interpreting results and communicating results to patient/family/caregiver.  Care coordination.

## 2024-05-25 NOTE — THERAPY
Physical Therapy   Daily Treatment     Patient Name: Rene Kelley  Age:  53 y.o., Sex:  male  Medical Record #: [id number]  Today's Date: 5/25/2024     Precautions  Precautions: Fall Risk  Comments: SBP<130    Assessment    Pt with improved mobility. Able to amb with bariatric walker in halls, requires frequent standing rests due to fatigue/min SOB. SPO2 greater than 90% on room air with mobility. Pt demonstrated good energy conservation techniques. C/o scrotal pain and edema. Will continue to follow.     Plan    Treatment Plan Status: Continue Current Treatment Plan  Type of Treatment: Bed Mobility, Equipment, Gait Training, Neuro Re-Education / Balance, Self Care / Home Evaluation, Stair Training, Therapeutic Activities, Therapeutic Exercise, Family / Caregiver Training  Treatment Frequency: 4 Times per Week  Treatment Duration: Until Therapy Goals Met    DC Equipment Recommendations: Front-Wheel Walker (Bariatric FWW ordered)  Discharge Recommendations: Recommend home health for continued physical therapy services (and assist from roommates)      Subjective    Pt agreed to PT.      Objective       05/25/24 1149   Precautions   Precautions Fall Risk   Comments SBP<130   Vitals   Pulse Oximetry 90 %  (90-94 with amb)   O2 Delivery Device None - Room Air   Vitals Comments pt took multiple rests due to min SOB   Pain 0 - 10 Group   Location Scrotum   Therapist Pain Assessment During Activity;Post Activity Pain Same as Prior to Activity;Nurse Notified  (Pt c/o scrotal area rubbing due to edema. Provided pt with dry weeve and underwear to use to hammock scrotal area if needed.)   Cognition    Cognition / Consciousness WDL   Level of Consciousness Alert   Comments pleasant, cooperative, receptive to education   Balance   Sitting Balance (Static) Fair +   Sitting Balance (Dynamic) Fair +   Standing Balance (Static) Fair +   Standing Balance (Dynamic) Fair   Weight Shift Sitting Good   Weight Shift Standing Good  "  Skilled Intervention Verbal Cuing   Comments pt able to weight shift while sitting and standing to adjust scrotum   Bed Mobility    Comments in  chair pre/post treatment, pt did not want to get into bed, \"I like sitting up so I can adjust myself.\"   Gait Analysis   Gait Level Of Assist Standby Assist   Assistive Device Front Wheel Walker  (bariatric FWW)   Distance (Feet) 15   # of Times Distance was Traveled 6   Deviation Bradykinetic   Skilled Intervention Verbal Cuing;Compensatory Strategies   Comments pt did stnading rests when needed for about 30 seconds   Functional Mobility   Sit to Stand Standby Assist   Bed, Chair, Wheelchair Transfer Standby Assist   Activity Tolerance   Sitting in Chair pre and post visit   Standing 18 minutes total   Comments limited by pain and min SOB requiring standing rest breaks   Short Term Goals    Short Term Goal # 1 Pt will perform supine <> sit without bed features with SPV in 6 visits to progress bed mobility   Goal Outcome # 1 goal not met   Short Term Goal # 2 Pt will perform STS with FWW and SPV in 6 visits to progress OOB mobility   Goal Outcome # 2 Progressing as expected   Short Term Goal # 3 Pt will perform stand pivot transfers with FWW and SPV in 6 visits to progress functional OOB mobility   Goal Outcome # 3 Progressing as expected   Short Term Goal # 4 Pt will ambulate 150 ft with FWW and SPV in 6 visits to progress functional gait   Goal Outcome # 4 Progressing as expected   Short Term Goal # 5 Pt will ascend & descend 4 steps with B rail & SPV in 6 visits to access home   Goal Outcome # 5 Goal not met   Education Group   Education Provided Role of Physical Therapist;Gait Training  (energy conservation techniques)   Role of Physical Therapist Patient Response Patient;Acceptance;Explanation;Verbal Demonstration   Gait Training Patient Response Patient;Acceptance;Explanation;Action Demonstration   Physical Therapy Treatment Plan   Physical Therapy Treatment Plan " Continue Current Treatment Plan   Treatment Plan  Bed Mobility;Equipment;Gait Training;Neuro Re-Education / Balance;Self Care / Home Evaluation;Stair Training;Therapeutic Activities;Therapeutic Exercise;Family / Caregiver Training   Treatment Frequency 4 Times per Week   Duration Until Therapy Goals Met   Anticipated Discharge Equipment and Recommendations   DC Equipment Recommendations Front-Wheel Walker  (Bariatric FWW ordered)   Discharge Recommendations Recommend home health for continued physical therapy services  (and assist from roommates)   Interdisciplinary Plan of Care Collaboration   IDT Collaboration with  Nursing   Patient Position at End of Therapy Seated;Chair Alarm On;Call Light within Reach;Tray Table within Reach;Phone within Reach   Collaboration Comments RN updated

## 2024-05-25 NOTE — PROGRESS NOTES
Patient complaining of 8/10 upper back pain with breathing and movement. Patient states the pain is stabbing and it's in between his shoulder blades. On call hospitalist notified. Bilateral arm blood pressures taken. Chest Xray ordered. Patient medicated with PRN pain meds.    BP on right upper arm: 138/81  BP on left upper arm: 122/74

## 2024-05-26 ENCOUNTER — PHARMACY VISIT (OUTPATIENT)
Dept: PHARMACY | Facility: MEDICAL CENTER | Age: 54
End: 2024-05-26
Payer: COMMERCIAL

## 2024-05-26 VITALS
TEMPERATURE: 97.6 F | BODY MASS INDEX: 45.1 KG/M2 | RESPIRATION RATE: 18 BRPM | SYSTOLIC BLOOD PRESSURE: 116 MMHG | HEIGHT: 70 IN | WEIGHT: 315 LBS | OXYGEN SATURATION: 95 % | HEART RATE: 62 BPM | DIASTOLIC BLOOD PRESSURE: 75 MMHG

## 2024-05-26 PROBLEM — Z72.0 TOBACCO ABUSE: Status: ACTIVE | Noted: 2024-05-26

## 2024-05-26 PROBLEM — N17.9 AKI (ACUTE KIDNEY INJURY) (HCC): Status: RESOLVED | Noted: 2024-05-24 | Resolved: 2024-05-26

## 2024-05-26 PROBLEM — K59.01 SLOW TRANSIT CONSTIPATION: Status: ACTIVE | Noted: 2024-05-26

## 2024-05-26 PROBLEM — R12 HEART BURN: Status: ACTIVE | Noted: 2024-05-26

## 2024-05-26 LAB
ANION GAP SERPL CALC-SCNC: 11 MMOL/L (ref 7–16)
BASOPHILS # BLD AUTO: 0.8 % (ref 0–1.8)
BASOPHILS # BLD: 0.06 K/UL (ref 0–0.12)
BUN SERPL-MCNC: 33 MG/DL (ref 8–22)
CALCIUM SERPL-MCNC: 8.5 MG/DL (ref 8.5–10.5)
CHLORIDE SERPL-SCNC: 99 MMOL/L (ref 96–112)
CO2 SERPL-SCNC: 21 MMOL/L (ref 20–33)
CREAT SERPL-MCNC: 1.33 MG/DL (ref 0.5–1.4)
EOSINOPHIL # BLD AUTO: 0.24 K/UL (ref 0–0.51)
EOSINOPHIL NFR BLD: 3.2 % (ref 0–6.9)
ERYTHROCYTE [DISTWIDTH] IN BLOOD BY AUTOMATED COUNT: 51 FL (ref 35.9–50)
FERRITIN SERPL-MCNC: 549 NG/ML (ref 22–322)
GFR SERPLBLD CREATININE-BSD FMLA CKD-EPI: 64 ML/MIN/1.73 M 2
GLUCOSE SERPL-MCNC: 106 MG/DL (ref 65–99)
HCT VFR BLD AUTO: 40 % (ref 42–52)
HGB BLD-MCNC: 13.5 G/DL (ref 14–18)
HGB RETIC QN AUTO: 34.2 PG/CELL (ref 29–35)
IMM GRANULOCYTES # BLD AUTO: 0.23 K/UL (ref 0–0.11)
IMM GRANULOCYTES NFR BLD AUTO: 3.1 % (ref 0–0.9)
IMM RETICS NFR: 23.9 % (ref 2.6–16.1)
IRON SATN MFR SERPL: 16 % (ref 15–55)
IRON SERPL-MCNC: 28 UG/DL (ref 50–180)
IRON SERPL-MCNC: 34 UG/DL (ref 50–180)
LYMPHOCYTES # BLD AUTO: 0.82 K/UL (ref 1–4.8)
LYMPHOCYTES NFR BLD: 10.9 % (ref 22–41)
MAGNESIUM SERPL-MCNC: 2.1 MG/DL (ref 1.5–2.5)
MCH RBC QN AUTO: 32.8 PG (ref 27–33)
MCHC RBC AUTO-ENTMCNC: 33.8 G/DL (ref 32.3–36.5)
MCV RBC AUTO: 97.3 FL (ref 81.4–97.8)
MONOCYTES # BLD AUTO: 1.11 K/UL (ref 0–0.85)
MONOCYTES NFR BLD AUTO: 14.7 % (ref 0–13.4)
NEUTROPHILS # BLD AUTO: 5.07 K/UL (ref 1.82–7.42)
NEUTROPHILS NFR BLD: 67.3 % (ref 44–72)
NRBC # BLD AUTO: 0 K/UL
NRBC BLD-RTO: 0 /100 WBC (ref 0–0.2)
PHOSPHATE SERPL-MCNC: 3.4 MG/DL (ref 2.5–4.5)
PLATELET # BLD AUTO: 147 K/UL (ref 164–446)
PMV BLD AUTO: 9.7 FL (ref 9–12.9)
POTASSIUM SERPL-SCNC: 4.6 MMOL/L (ref 3.6–5.5)
RBC # BLD AUTO: 4.11 M/UL (ref 4.7–6.1)
RETICS # AUTO: 0.09 M/UL (ref 0.04–0.12)
RETICS/RBC NFR: 2.2 % (ref 0.8–2.6)
SODIUM SERPL-SCNC: 131 MMOL/L (ref 135–145)
TIBC SERPL-MCNC: 212 UG/DL (ref 250–450)
TRANSFERRIN SERPL-MCNC: 164 MG/DL (ref 200–370)
UIBC SERPL-MCNC: 178 UG/DL (ref 110–370)
VIT B12 SERPL-MCNC: 559 PG/ML (ref 211–911)
WBC # BLD AUTO: 7.5 K/UL (ref 4.8–10.8)

## 2024-05-26 PROCEDURE — A9270 NON-COVERED ITEM OR SERVICE: HCPCS | Performed by: SURGERY

## 2024-05-26 PROCEDURE — 84466 ASSAY OF TRANSFERRIN: CPT

## 2024-05-26 PROCEDURE — 85046 RETICYTE/HGB CONCENTRATE: CPT

## 2024-05-26 PROCEDURE — 700111 HCHG RX REV CODE 636 W/ 250 OVERRIDE (IP): Mod: JZ | Performed by: SURGERY

## 2024-05-26 PROCEDURE — 700102 HCHG RX REV CODE 250 W/ 637 OVERRIDE(OP): Performed by: SURGERY

## 2024-05-26 PROCEDURE — 82728 ASSAY OF FERRITIN: CPT

## 2024-05-26 PROCEDURE — 84100 ASSAY OF PHOSPHORUS: CPT

## 2024-05-26 PROCEDURE — 80048 BASIC METABOLIC PNL TOTAL CA: CPT

## 2024-05-26 PROCEDURE — 83735 ASSAY OF MAGNESIUM: CPT

## 2024-05-26 PROCEDURE — A9270 NON-COVERED ITEM OR SERVICE: HCPCS | Performed by: INTERNAL MEDICINE

## 2024-05-26 PROCEDURE — 83550 IRON BINDING TEST: CPT

## 2024-05-26 PROCEDURE — RXMED WILLOW AMBULATORY MEDICATION CHARGE: Performed by: INTERNAL MEDICINE

## 2024-05-26 PROCEDURE — 99239 HOSP IP/OBS DSCHRG MGMT >30: CPT | Performed by: INTERNAL MEDICINE

## 2024-05-26 PROCEDURE — 36415 COLL VENOUS BLD VENIPUNCTURE: CPT

## 2024-05-26 PROCEDURE — 82607 VITAMIN B-12: CPT

## 2024-05-26 PROCEDURE — 700102 HCHG RX REV CODE 250 W/ 637 OVERRIDE(OP): Performed by: INTERNAL MEDICINE

## 2024-05-26 PROCEDURE — 83540 ASSAY OF IRON: CPT

## 2024-05-26 PROCEDURE — 85025 COMPLETE CBC W/AUTO DIFF WBC: CPT

## 2024-05-26 RX ORDER — AMOXICILLIN 250 MG
2 CAPSULE ORAL 2 TIMES DAILY
Qty: 30 TABLET | Refills: 0 | Status: ON HOLD | OUTPATIENT
Start: 2024-05-26

## 2024-05-26 RX ORDER — METHOCARBAMOL 500 MG/1
500 TABLET, FILM COATED ORAL 4 TIMES DAILY
Qty: 120 TABLET | Refills: 0 | Status: ON HOLD | OUTPATIENT
Start: 2024-05-26

## 2024-05-26 RX ORDER — FAMOTIDINE 20 MG/1
20 TABLET, FILM COATED ORAL DAILY
Qty: 30 TABLET | Refills: 0 | Status: ON HOLD | OUTPATIENT
Start: 2024-05-27

## 2024-05-26 RX ORDER — LEVOTHYROXINE SODIUM 0.1 MG/1
100 TABLET ORAL
Qty: 30 TABLET | Refills: 0 | Status: ON HOLD | OUTPATIENT
Start: 2024-05-27

## 2024-05-26 RX ORDER — AMLODIPINE BESYLATE 10 MG/1
10 TABLET ORAL DAILY
Qty: 30 TABLET | Refills: 0 | Status: ON HOLD | OUTPATIENT
Start: 2024-05-27

## 2024-05-26 RX ORDER — GABAPENTIN 100 MG/1
100 CAPSULE ORAL 3 TIMES DAILY
Qty: 90 CAPSULE | Refills: 0 | Status: ON HOLD | OUTPATIENT
Start: 2024-05-26

## 2024-05-26 RX ORDER — LACTULOSE 20 G/30ML
30 SOLUTION ORAL ONCE
Status: COMPLETED | OUTPATIENT
Start: 2024-05-26 | End: 2024-05-26

## 2024-05-26 RX ORDER — POLYETHYLENE GLYCOL 3350 17 G/17G
17 POWDER, FOR SOLUTION ORAL DAILY
Qty: 10 EACH | Refills: 0 | Status: ON HOLD | OUTPATIENT
Start: 2024-05-27

## 2024-05-26 RX ORDER — FERROUS SULFATE 325(65) MG
325 TABLET ORAL
Qty: 30 TABLET | Refills: 0 | Status: ON HOLD | OUTPATIENT
Start: 2024-05-27

## 2024-05-26 RX ORDER — CARVEDILOL 12.5 MG/1
12.5 TABLET ORAL 2 TIMES DAILY WITH MEALS
Qty: 60 TABLET | Refills: 0 | Status: ON HOLD | OUTPATIENT
Start: 2024-05-26

## 2024-05-26 RX ORDER — NICOTINE 21 MG/24HR
1 PATCH, TRANSDERMAL 24 HOURS TRANSDERMAL EVERY 24 HOURS
Qty: 28 PATCH | Refills: 0 | Status: ON HOLD | OUTPATIENT
Start: 2024-05-26

## 2024-05-26 RX ORDER — METHOCARBAMOL 500 MG/1
500 TABLET, FILM COATED ORAL 4 TIMES DAILY
Status: DISCONTINUED | OUTPATIENT
Start: 2024-05-26 | End: 2024-05-26 | Stop reason: HOSPADM

## 2024-05-26 RX ORDER — OXYCODONE HYDROCHLORIDE 5 MG/1
5 TABLET ORAL EVERY 6 HOURS PRN
Qty: 20 TABLET | Refills: 0 | Status: ON HOLD | OUTPATIENT
Start: 2024-05-26 | End: 2024-05-31

## 2024-05-26 RX ORDER — FERROUS SULFATE 325(65) MG
325 TABLET ORAL
Status: DISCONTINUED | OUTPATIENT
Start: 2024-05-27 | End: 2024-05-26 | Stop reason: HOSPADM

## 2024-05-26 RX ADMIN — METHOCARBAMOL 500 MG: 500 TABLET ORAL at 13:16

## 2024-05-26 RX ADMIN — METHOCARBAMOL 500 MG: 500 TABLET ORAL at 09:42

## 2024-05-26 RX ADMIN — ENOXAPARIN SODIUM 40 MG: 100 INJECTION SUBCUTANEOUS at 05:22

## 2024-05-26 RX ADMIN — LEVOTHYROXINE SODIUM 100 MCG: 0.1 TABLET ORAL at 05:24

## 2024-05-26 RX ADMIN — OXYCODONE HYDROCHLORIDE 10 MG: 10 TABLET ORAL at 05:23

## 2024-05-26 RX ADMIN — AMLODIPINE BESYLATE 10 MG: 10 TABLET ORAL at 05:23

## 2024-05-26 RX ADMIN — CARVEDILOL 12.5 MG: 12.5 TABLET, FILM COATED ORAL at 09:21

## 2024-05-26 RX ADMIN — GABAPENTIN 100 MG: 100 CAPSULE ORAL at 11:05

## 2024-05-26 RX ADMIN — Medication 1 APPLICATOR: at 05:24

## 2024-05-26 RX ADMIN — POLYETHYLENE GLYCOL 3350 1 PACKET: 17 POWDER, FOR SOLUTION ORAL at 05:22

## 2024-05-26 RX ADMIN — LACTULOSE 30 ML: 20 SOLUTION ORAL at 09:22

## 2024-05-26 RX ADMIN — FAMOTIDINE 20 MG: 20 TABLET, FILM COATED ORAL at 05:23

## 2024-05-26 RX ADMIN — GABAPENTIN 100 MG: 100 CAPSULE ORAL at 05:23

## 2024-05-26 RX ADMIN — SENNOSIDES AND DOCUSATE SODIUM 2 TABLET: 50; 8.6 TABLET ORAL at 05:22

## 2024-05-26 ASSESSMENT — FIBROSIS 4 INDEX: FIB4 SCORE: 2.05

## 2024-05-26 ASSESSMENT — PAIN DESCRIPTION - PAIN TYPE
TYPE: ACUTE PAIN
TYPE: ACUTE PAIN

## 2024-05-26 NOTE — PROGRESS NOTES
Report received from night RN at 0700. PT seen at bedside and pt care assumed. Pt is A&Ox4, on 1L NC, denies pain. PIV flushed and intact. PT is SB on telemetry monitor. PT is x1 assist.     Plan of care reviewed with pt, call light, phone, and personal belongings within reach. Bed alarm on, and bed in low locked position. All pt's needs met at this time.    Bedside report received from off going RN/tech: BEATA Parra, assumed care of patient.     Fall Risk Score: LOW RISK  Fall risk interventions in place: Place yellow fall risk ID band on patient, Provide patient/family education based on risk assessment, Educate patient/family to call staff for assistance when getting out of bed, Place fall precaution signage outside patient door, Utilize bed/chair fall alarm, and Bed alarm connected correctly  Bed type: Regular (Tano Score less than 17 interventions in place)  Patient on cardiac monitor: Yes  IVF/IV medications: Not Applicable   Oxygen: How many liters 1L, line traced to wall, no O2 tank in room  Bedside sitter: Not Applicable   Isolation: Not applicable

## 2024-05-26 NOTE — DISCHARGE INSTRUCTIONS
Discharge Instructions per Dr. Benny Stevens D.O.    DIET: Diet Order Diet: Regular    ACTIVITY: As tolerated    A proper diet that is low in grease, fat, and salt, along with 30 minutes of exercise per day will lead to weight loss, and better controlled blood sugar and blood pressure.    DIAGNOSIS: Aortic dissection (HCC)    Follow up with your Primary Care Provider Pcp as scheduled or sooner if your symptoms persist or worsen.  Return to Emergency Room for sever chest pain, shortness of breath, signs of a stroke, or any other emergencies.

## 2024-05-26 NOTE — DISCHARGE SUMMARY
Discharge Summary    CHIEF COMPLAINT ON ADMISSION  Chief Complaint   Patient presents with    Back Pain     Mid back pain associated with RLE pain / numbness sudden onset 03:00am today    Leg Pain     Decreased cap refill BLE       Reason for Admission  EMS     Admission Date  5/21/2024    CODE STATUS  Full Code    HPI & HOSPITAL COURSE     53 y.o. male who presented 5/21/2024 with history of what appears to be untreated hypertension, substance abuse, morbid obesity, presenting to the facility after he developed acute onset of severe back pain, right leg pain, nausea, vomiting, [place] around [place] [facility]. Reportedly EMS was activated and unfortunately patient was unable to be situated with the helicopter, he therefore was in the field medicated, and transferred to this facility with the suspicion of possible vascular catastrophe.  The patient reportedly has an extensive history of smoking.  He tested positive for methamphetamines.  The patient was immediately seen by vascular surgery on arrival and he was diagnosed with an acute complicated type B aortic dissection, right lower extremity ischemia, occluded celiac artery, severe hypertension initially.  The patient was emergently taken to the operating room, and underwent a emergent endovascular repair of a descending thoracic aortic B type dissection with the placement of a 40 mm x 15 cm thoracic aortic stent graft, exposure of bilateral femoral arteries, angioplasty and self-expanding stent placement in the right external iliac artery, angioplasty and stenting of the left common iliac artery using a 9 mm x 25 mm balloon expandable stent, left to right femoral to femoral artery bypass using a 7 mm dacryon graft.  Following the patient was admitted to the ICU level of care for postoperative care with the trauma surgeons, and vascular surgery.  Patient initially required nicardipine and esmolol drips for blood pressure  Returned pts call and informed them I have to request a new order for a greater than 14 week scan as she is 14 weeks today. Let pt know I would call back when we get the new order in.    control.  He was able to be transition to oral amlodipine and carvedilol.  On 5/24 the patient deemed stabilized to transfer out of the ICU to the telemetry unit.  The patient is complaining of back pain and incisional pain.  The patient's bilateral lower extremities are warm, perfusing well, groin incisions are intact.     Patient did have an acute kidney injury.  Renal ultrasound showed no hydronephrosis.  No renal calculus.  Kidney function improved with fluid resuscitation.  Patient was also found to have hypothyroidism and started on levothyroxine.  He was also started on iron supplementation.     Patient was weaned to room air.  PT/OT recommended home health and a walker for DME.  [place].  This was discussed with social work.  Referrals have been sent to home health agencies in [facility] but as of yet no acceptance as it is Memorial Day weekend.  Patient felt comfortable going home without acceptance from home health.  Outpatient PT/OT referral was placed.  He was counseled extensively about lifestyle changes to include smoking and illicit drugs.     Patient is medically stable to discharge home with home health referral sent.  He was provided with a walker.  Follow-up with primary care, vascular surgery as outpatient.  [facility]      Therefore, he is discharged in fair and stable condition to home with organized home healthcare and close outpatient follow-up.    The patient met 2-midnight criteria for an inpatient stay at the time of discharge.    Discharge Date  5/26/2024    FOLLOW UP ITEMS POST DISCHARGE  None    DISCHARGE DIAGNOSES  Principal Problem:    Aortic dissection (HCC) (POA: Yes)  Active Problems:    Hypertension (POA: Yes)    Dissection of aorta (HCC) (POA: Yes)    No contraindication to deep vein thrombosis (DVT) prophylaxis (POA: Yes)    Substance abuse (HCC) (POA: Yes)    Thrombocytopenia  (HCC) (POA: Yes)    Morbid obesity due to excess calories (HCC) (POA: Yes)    Acquired hypothyroidism (POA: Yes)    Heart burn (POA: Unknown)    Slow transit constipation (POA: Unknown)    Tobacco abuse (POA: Unknown)  Resolved Problems:    Respiratory failure following trauma and surgery (HCC) (POA: Yes)    Hyperkalemia (POA: Yes)    MAIDA (acute kidney injury) (HCC) (POA: Yes)      FOLLOW UP  Gagandeep Arnett M.D.  [address]  [address]  [place] [address] [phone]    Follow up      Gagandeep Arnett M.D.  [address] [facility]  [address]  [place] [id number] [phone]            MEDICATIONS ON DISCHARGE     Medication List        START taking these medications        Instructions   amLODIPine 10 MG Tabs  Start taking on: May 27, 2024  Commonly known as: Norvasc   Take 1 Tablet by mouth every day.  Dose: 10 mg     carvedilol 12.5 MG Tabs  Commonly known as: Coreg   Take 1 Tablet by mouth 2 times a day with meals.  Dose: 12.5 mg     famotidine 20 MG Tabs  Start taking on: May 27, 2024  Commonly known as: Pepcid   Take 1 Tablet by mouth every day.  Dose: 20 mg     ferrous sulfate 325 (65 Fe) MG tablet  Start taking on: May 27, 2024   Take 1 Tablet by mouth every morning with breakfast.  Dose: 325 mg     fluticasone-umeclidinium-vilanterol 100-62.5-25 mcg/act inhaler  Start taking on: May 27, 2024  Commonly known as: Trelegy Ellipta   Inhale 1 Puff every day.  Dose: 1 Puff     gabapentin 100 MG Caps  Commonly known as: Neurontin   Take 1 Capsule by mouth 3 times a day.  Dose: 100 mg     levothyroxine 100 MCG Tabs  Start taking on: May 27, 2024  Commonly known as: Synthroid   Take 1 Tablet by mouth every morning on an empty stomach.  Dose: 100 mcg     methocarbamol 500 MG Tabs  Commonly known as: Robaxin   Take 1 Tablet by mouth 4 times a day.  Dose: 500 mg     nicotine 21 MG/24HR Pt24  Commonly known as: Nicoderm   Place 1 Patch on the skin every 24 hours.  Dose: 1 Patch     oxyCODONE immediate-release 5 MG Tabs  Commonly  known as: Roxicodone   Take 1 Tablet by mouth every 6 hours as needed for Severe Pain for up to 5 days.  Dose: 5 mg     polyethylene glycol/lytes Pack  Start taking on: May 27, 2024  Commonly known as: Miralax   Take 1 Packet by mouth every day.  Dose: 17 g     senna-docusate 8.6-50 MG Tabs  Commonly known as: Pericolace Or Senokot S   Take 2 Tablets by mouth 2 times a day.  Dose: 2 Tablet              Allergies  No Known Allergies    DIET  Orders Placed This Encounter   Procedures    Diet Order Diet: Regular     Standing Status:   Standing     Number of Occurrences:   1     Order Specific Question:   Diet:     Answer:   Regular [1]       ACTIVITY  As tolerated.  Weight bearing as tolerated    CONSULTATIONS  Vascular surgery, Trauma    PROCEDURES  emergent endovascular repair of descending thoracic aortic type B dissection, left iliacl stenting, and left to right femoral-femoral bypass.     LABORATORY  Lab Results   Component Value Date    SODIUM 131 (L) 05/26/2024    POTASSIUM 4.6 05/26/2024    CHLORIDE 99 05/26/2024    CO2 21 05/26/2024    GLUCOSE 106 (H) 05/26/2024    BUN 33 (H) 05/26/2024    CREATININE 1.33 05/26/2024        Lab Results   Component Value Date    WBC 7.5 05/26/2024    HEMOGLOBIN 13.5 (L) 05/26/2024    HEMATOCRIT 40.0 (L) 05/26/2024    PLATELETCT 147 (L) 05/26/2024        I discussed medications and side effects with the patient.  I discussed prognosis and importance of medical compliance with the patient.  I counseled the patient about diet, exercise, weight loss, smoking cessation, and life style modifications.  All questions and concerns have been addressed.  Total time of the discharge process was 38 minutes.

## 2024-05-26 NOTE — PROGRESS NOTES
Pt discharged home with home health care. Pt was A&Ox4, VSS, on room air, fully clothed, and stated he had all of his belongings with him. PT's PIV and telemetry box were both removed. PT's discharge paperwork was reviewed with pt, and a copy given to him. PT's medications were retrieved from pharmacy and given to pt at bedside. Pt was transported to [facility] via wheel chair after medication delivery, to be taken home by car.

## 2024-05-26 NOTE — DISCHARGE PLANNING
Case Management Discharge Planning    Admission Date: 5/21/2024  GMLOS: 8.7  ALOS: 5    6-Clicks ADL Score: 16  6-Clicks Mobility Score: 12  PT and/or OT Eval ordered: Yes  Post-acute Referrals Ordered: Yes  Post-acute Choice Obtained: No  Has referral(s) been sent to post-acute provider:  Yes      Anticipated Discharge Dispo: Discharge Disposition: D/T to SNF with Medicare cert in anticipation of skilled care (03)    DME Needed: Yes    DME Ordered: Yes- [facility]. No oxygen order at this time    Action(s) Taken: Updated Provider/Nurse on Discharge Plan    Escalations Completed: DME Company and Bedside RN    Medically Clear: Yes    Next Steps: RNCM sent order to [facility] for patients FWW. Patient lives in [place], [place]. Checking on home health agencies now that can service that area. RNCM updated bedside RN and patient. No home oxygen order at this time.     RNCM sent referral to [facility] ([place]). Will check out resources in [place].  [facility] denied.     RNCAM sent referral to [facility]. Awaiting response. Tried to call but it is not during their work hours at this time.     Barriers to Discharge: DME and Outpatient referrals pending

## 2024-05-26 NOTE — CARE PLAN
The patient is Stable - Low risk of patient condition declining or worsening    Shift Goals  Clinical Goals: BP monitoring, pain management  Patient Goals: dc home  Family Goals: none present    Progress made toward(s) clinical / shift goals:    Problem: Knowledge Deficit - Standard  Goal: Patient and family/care givers will demonstrate understanding of plan of care, disease process/condition, diagnostic tests and medications  Outcome: Progressing  Note: Educated pt on the disease process, medications and treatment plan. Pt verbalized understanding of the plan of care.     Problem: Skin Integrity  Goal: Skin integrity is maintained or improved  Outcome: Progressing  Note: No new skin issue noted throughout the shift. Pt ambulates in the room to promote adequate blood circulation.     Problem: Fall Risk  Goal: Patient will remain free from falls  Outcome: Progressing  Note: Educated pt on fall precautions, bed locked and in lowest position. Call light and personal belongings within reach. Pt calls appropriately using call light for assistance.      Problem: Pain - Standard  Goal: Alleviation of pain or a reduction in pain to the patient’s comfort goal  Outcome: Progressing  Note: Pt c/o pain throughout the shift. Pt medicated per MAR. Educated pt call RN if pain occurs.       Patient is not progressing towards the following goals:

## 2024-05-26 NOTE — PROGRESS NOTES
Telemetry Report:    Rhythm: SB/SR  Heart Rate: 59 to 74  Ectopy: PVC    AK: 0.16  QRS: 0.07  QT: 0.39          Per telemetry room monitor

## 2024-05-26 NOTE — CARE PLAN
Problem: Knowledge Deficit - Standard  Goal: Patient and family/care givers will demonstrate understanding of plan of care, disease process/condition, diagnostic tests and medications  Outcome: Met     Problem: Skin Integrity  Goal: Skin integrity is maintained or improved  Outcome: Met     Problem: Fall Risk  Goal: Patient will remain free from falls  Outcome: Met     Problem: Pain - Standard  Goal: Alleviation of pain or a reduction in pain to the patient’s comfort goal  Outcome: Met   The patient is Stable - Low risk of patient condition declining or worsening    Shift Goals  Clinical Goals: Remian free of falls, no skin breakdown  Patient Goals: Rest  Family Goals: STEVE    Progress made toward(s) clinical / shift goals:     Pt discharged home with home health, all goals met

## 2024-05-26 NOTE — DISCHARGE PLANNING
Received choice form @: 1129  Agency/Facility name: [facility]  Sent referral per choice form @: 1130

## 2024-05-28 ENCOUNTER — TELEPHONE (OUTPATIENT)
Dept: VASCULAR LAB | Facility: MEDICAL CENTER | Age: 54
End: 2024-05-28
Payer: COMMERCIAL

## 2024-05-28 DIAGNOSIS — Z95.828 S/P INSERTION OF ENDOVASCULAR THORACIC AORTIC STENT GRAFT: ICD-10-CM

## 2024-05-28 DIAGNOSIS — I71.012 DISSECTION OF DESCENDING THORACIC AORTA (HCC): ICD-10-CM

## 2024-05-28 DIAGNOSIS — Z86.79 STATUS POST ENDOVASCULAR ANEURYSM REPAIR (EVAR): ICD-10-CM

## 2024-05-28 DIAGNOSIS — Z98.890 STATUS POST ENDOVASCULAR ANEURYSM REPAIR (EVAR): ICD-10-CM

## 2024-05-28 NOTE — TELEPHONE ENCOUNTER
Chart reviewed for EVAR/TEVAR surveillance program. Added to lifelong surveillance program. Per protocol, CTA ordered to be completed 4-6 weeks post repair. Surgeon to notify patient of surveillance imaging needed.     LVM for pt to schedule f/u with vascular surgery and schedule CTA      Tamika COTA   Vascular Medicine Nurse Coordinator

## 2024-05-30 ENCOUNTER — APPOINTMENT (OUTPATIENT)
Dept: RADIOLOGY | Facility: MEDICAL CENTER | Age: 54
DRG: 252 | End: 2024-05-30
Attending: EMERGENCY MEDICINE
Payer: COMMERCIAL

## 2024-05-30 ENCOUNTER — HOSPITAL ENCOUNTER (INPATIENT)
Facility: MEDICAL CENTER | Age: 54
End: 2024-05-30
Attending: EMERGENCY MEDICINE | Admitting: HOSPITALIST
Payer: COMMERCIAL

## 2024-05-30 DIAGNOSIS — T81.41XA INFECTION OF SUPERFICIAL INCISIONAL SURGICAL SITE AFTER PROCEDURE, INITIAL ENCOUNTER: ICD-10-CM

## 2024-05-30 PROBLEM — T14.8XXA WOUND INFECTION: Status: ACTIVE | Noted: 2024-05-30

## 2024-05-30 PROBLEM — J96.01 ACUTE RESPIRATORY FAILURE WITH HYPOXIA (HCC): Status: ACTIVE | Noted: 2024-05-30

## 2024-05-30 PROBLEM — L08.9 WOUND INFECTION: Status: ACTIVE | Noted: 2024-05-30

## 2024-05-30 PROBLEM — I50.31 ACUTE DIASTOLIC HEART FAILURE (HCC): Status: ACTIVE | Noted: 2024-05-30

## 2024-05-30 LAB
ALBUMIN SERPL BCP-MCNC: 2.8 G/DL (ref 3.2–4.9)
ALBUMIN/GLOB SERPL: 0.8 G/DL
ALP SERPL-CCNC: 93 U/L (ref 30–99)
ALT SERPL-CCNC: 28 U/L (ref 2–50)
ANION GAP SERPL CALC-SCNC: 13 MMOL/L (ref 7–16)
APPEARANCE UR: CLEAR
APTT PPP: 30.3 SEC (ref 24.7–36)
AST SERPL-CCNC: 33 U/L (ref 12–45)
BACTERIA #/AREA URNS HPF: NEGATIVE /HPF
BASOPHILS # BLD AUTO: 0.9 % (ref 0–1.8)
BASOPHILS # BLD: 0.1 K/UL (ref 0–0.12)
BILIRUB SERPL-MCNC: 1.6 MG/DL (ref 0.1–1.5)
BILIRUB UR QL STRIP.AUTO: NEGATIVE
BUN SERPL-MCNC: 21 MG/DL (ref 8–22)
CALCIUM ALBUM COR SERPL-MCNC: 9.2 MG/DL (ref 8.5–10.5)
CALCIUM SERPL-MCNC: 8.2 MG/DL (ref 8.5–10.5)
CHLORIDE SERPL-SCNC: 99 MMOL/L (ref 96–112)
CO2 SERPL-SCNC: 20 MMOL/L (ref 20–33)
COLOR UR: YELLOW
CREAT SERPL-MCNC: 1.01 MG/DL (ref 0.5–1.4)
EOSINOPHIL # BLD AUTO: 0.1 K/UL (ref 0–0.51)
EOSINOPHIL NFR BLD: 0.9 % (ref 0–6.9)
EPI CELLS #/AREA URNS HPF: NEGATIVE /HPF
ERYTHROCYTE [DISTWIDTH] IN BLOOD BY AUTOMATED COUNT: 48.9 FL (ref 35.9–50)
EST. AVERAGE GLUCOSE BLD GHB EST-MCNC: 97 MG/DL
FLUAV RNA SPEC QL NAA+PROBE: NEGATIVE
FLUBV RNA SPEC QL NAA+PROBE: NEGATIVE
GFR SERPLBLD CREATININE-BSD FMLA CKD-EPI: 89 ML/MIN/1.73 M 2
GLOBULIN SER CALC-MCNC: 3.4 G/DL (ref 1.9–3.5)
GLUCOSE SERPL-MCNC: 81 MG/DL (ref 65–99)
GLUCOSE UR STRIP.AUTO-MCNC: NEGATIVE MG/DL
GRAM STN SPEC: NORMAL
HBA1C MFR BLD: 5 % (ref 4–5.6)
HCT VFR BLD AUTO: 40.3 % (ref 42–52)
HGB BLD-MCNC: 13.6 G/DL (ref 14–18)
HYALINE CASTS #/AREA URNS LPF: ABNORMAL /LPF
INR PPP: 1.25 (ref 0.87–1.13)
KETONES UR STRIP.AUTO-MCNC: NEGATIVE MG/DL
LACTATE SERPL-SCNC: 1.1 MMOL/L (ref 0.5–2)
LEUKOCYTE ESTERASE UR QL STRIP.AUTO: NEGATIVE
LYMPHOCYTES # BLD AUTO: 0.88 K/UL (ref 1–4.8)
LYMPHOCYTES NFR BLD: 7.7 % (ref 22–41)
MANUAL DIFF BLD: NORMAL
MCH RBC QN AUTO: 32.3 PG (ref 27–33)
MCHC RBC AUTO-ENTMCNC: 33.7 G/DL (ref 32.3–36.5)
MCV RBC AUTO: 95.7 FL (ref 81.4–97.8)
METAMYELOCYTES NFR BLD MANUAL: 1.7 %
MICRO URNS: ABNORMAL
MONOCYTES # BLD AUTO: 0.68 K/UL (ref 0–0.85)
MONOCYTES NFR BLD AUTO: 6 % (ref 0–13.4)
MORPHOLOGY BLD-IMP: NORMAL
MYELOCYTES NFR BLD MANUAL: 2.6 %
NEUTROPHILS # BLD AUTO: 9.14 K/UL (ref 1.82–7.42)
NEUTROPHILS NFR BLD: 80.2 % (ref 44–72)
NITRITE UR QL STRIP.AUTO: NEGATIVE
NRBC # BLD AUTO: 0 K/UL
NRBC BLD-RTO: 0 /100 WBC (ref 0–0.2)
PH UR STRIP.AUTO: 5.5 [PH] (ref 5–8)
PLATELET # BLD AUTO: 231 K/UL (ref 164–446)
PLATELET BLD QL SMEAR: NORMAL
PMV BLD AUTO: 9.9 FL (ref 9–12.9)
POTASSIUM SERPL-SCNC: 4 MMOL/L (ref 3.6–5.5)
PROT SERPL-MCNC: 6.2 G/DL (ref 6–8.2)
PROT UR QL STRIP: NEGATIVE MG/DL
PROTHROMBIN TIME: 15.8 SEC (ref 12–14.6)
RBC # BLD AUTO: 4.21 M/UL (ref 4.7–6.1)
RBC # URNS HPF: ABNORMAL /HPF
RBC BLD AUTO: NORMAL
RBC UR QL AUTO: ABNORMAL
RSV RNA SPEC QL NAA+PROBE: NEGATIVE
SARS-COV-2 RNA RESP QL NAA+PROBE: NOTDETECTED
SCCMEC + MECA PNL NOSE NAA+PROBE: NEGATIVE
SIGNIFICANT IND 70042: NORMAL
SITE SITE: NORMAL
SODIUM SERPL-SCNC: 132 MMOL/L (ref 135–145)
SOURCE SOURCE: NORMAL
SP GR UR STRIP.AUTO: 1.02
SPECIMEN SOURCE: NORMAL
TROPONIN T SERPL-MCNC: 50 NG/L (ref 6–19)
UROBILINOGEN UR STRIP.AUTO-MCNC: 1 MG/DL
WBC # BLD AUTO: 11.4 K/UL (ref 4.8–10.8)
WBC #/AREA URNS HPF: ABNORMAL /HPF

## 2024-05-30 PROCEDURE — 700111 HCHG RX REV CODE 636 W/ 250 OVERRIDE (IP): Mod: JZ | Performed by: HOSPITALIST

## 2024-05-30 PROCEDURE — 96375 TX/PRO/DX INJ NEW DRUG ADDON: CPT

## 2024-05-30 PROCEDURE — 99221 1ST HOSP IP/OBS SF/LOW 40: CPT | Mod: 25 | Performed by: SURGERY

## 2024-05-30 PROCEDURE — 80053 COMPREHEN METABOLIC PANEL: CPT

## 2024-05-30 PROCEDURE — 770020 HCHG ROOM/CARE - TELE (206)

## 2024-05-30 PROCEDURE — A9270 NON-COVERED ITEM OR SERVICE: HCPCS | Performed by: HOSPITALIST

## 2024-05-30 PROCEDURE — 84484 ASSAY OF TROPONIN QUANT: CPT

## 2024-05-30 PROCEDURE — 96368 THER/DIAG CONCURRENT INF: CPT

## 2024-05-30 PROCEDURE — 700102 HCHG RX REV CODE 250 W/ 637 OVERRIDE(OP): Performed by: HOSPITALIST

## 2024-05-30 PROCEDURE — 83036 HEMOGLOBIN GLYCOSYLATED A1C: CPT

## 2024-05-30 PROCEDURE — 87205 SMEAR GRAM STAIN: CPT

## 2024-05-30 PROCEDURE — 36415 COLL VENOUS BLD VENIPUNCTURE: CPT

## 2024-05-30 PROCEDURE — 71275 CT ANGIOGRAPHY CHEST: CPT

## 2024-05-30 PROCEDURE — 87641 MR-STAPH DNA AMP PROBE: CPT

## 2024-05-30 PROCEDURE — 85730 THROMBOPLASTIN TIME PARTIAL: CPT

## 2024-05-30 PROCEDURE — 99285 EMERGENCY DEPT VISIT HI MDM: CPT

## 2024-05-30 PROCEDURE — 96366 THER/PROPH/DIAG IV INF ADDON: CPT

## 2024-05-30 PROCEDURE — 87040 BLOOD CULTURE FOR BACTERIA: CPT

## 2024-05-30 PROCEDURE — 0Y960ZZ DRAINAGE OF LEFT INGUINAL REGION, OPEN APPROACH: ICD-10-PCS | Performed by: EMERGENCY MEDICINE

## 2024-05-30 PROCEDURE — 700105 HCHG RX REV CODE 258: Performed by: EMERGENCY MEDICINE

## 2024-05-30 PROCEDURE — 87070 CULTURE OTHR SPECIMN AEROBIC: CPT

## 2024-05-30 PROCEDURE — 81001 URINALYSIS AUTO W/SCOPE: CPT

## 2024-05-30 PROCEDURE — 99407 BEHAV CHNG SMOKING > 10 MIN: CPT | Performed by: HOSPITALIST

## 2024-05-30 PROCEDURE — 85610 PROTHROMBIN TIME: CPT

## 2024-05-30 PROCEDURE — 85007 BL SMEAR W/DIFF WBC COUNT: CPT

## 2024-05-30 PROCEDURE — 700105 HCHG RX REV CODE 258: Performed by: HOSPITALIST

## 2024-05-30 PROCEDURE — 83605 ASSAY OF LACTIC ACID: CPT

## 2024-05-30 PROCEDURE — 71045 X-RAY EXAM CHEST 1 VIEW: CPT

## 2024-05-30 PROCEDURE — 304217 HCHG IRRIGATION SYSTEM

## 2024-05-30 PROCEDURE — 700117 HCHG RX CONTRAST REV CODE 255: Performed by: EMERGENCY MEDICINE

## 2024-05-30 PROCEDURE — 99223 1ST HOSP IP/OBS HIGH 75: CPT | Performed by: HOSPITALIST

## 2024-05-30 PROCEDURE — 700111 HCHG RX REV CODE 636 W/ 250 OVERRIDE (IP): Performed by: EMERGENCY MEDICINE

## 2024-05-30 PROCEDURE — 15853 REMOVAL SUTR/STAPL XREQ ANES: CPT

## 2024-05-30 PROCEDURE — 0241U HCHG SARS-COV-2 COVID-19 NFCT DS RESP RNA 4 TRGT MIC: CPT

## 2024-05-30 PROCEDURE — 87086 URINE CULTURE/COLONY COUNT: CPT

## 2024-05-30 PROCEDURE — 87076 CULTURE ANAEROBE IDENT EACH: CPT

## 2024-05-30 PROCEDURE — 96365 THER/PROPH/DIAG IV INF INIT: CPT

## 2024-05-30 PROCEDURE — 85027 COMPLETE CBC AUTOMATED: CPT

## 2024-05-30 RX ORDER — FUROSEMIDE 10 MG/ML
40 INJECTION INTRAMUSCULAR; INTRAVENOUS EVERY 8 HOURS
Status: DISCONTINUED | OUTPATIENT
Start: 2024-05-30 | End: 2024-06-04

## 2024-05-30 RX ORDER — ACETAMINOPHEN 325 MG/1
650 TABLET ORAL EVERY 6 HOURS PRN
Status: DISCONTINUED | OUTPATIENT
Start: 2024-05-30 | End: 2024-05-31

## 2024-05-30 RX ORDER — OXYCODONE HYDROCHLORIDE 10 MG/1
10 TABLET ORAL
Status: DISCONTINUED | OUTPATIENT
Start: 2024-05-30 | End: 2024-05-31

## 2024-05-30 RX ORDER — METHOCARBAMOL 500 MG/1
500 TABLET, FILM COATED ORAL 4 TIMES DAILY
Status: DISCONTINUED | OUTPATIENT
Start: 2024-05-30 | End: 2024-06-11 | Stop reason: HOSPADM

## 2024-05-30 RX ORDER — NICOTINE 21 MG/24HR
1 PATCH, TRANSDERMAL 24 HOURS TRANSDERMAL EVERY 24 HOURS
Status: DISCONTINUED | OUTPATIENT
Start: 2024-05-31 | End: 2024-06-11 | Stop reason: HOSPADM

## 2024-05-30 RX ORDER — FAMOTIDINE 20 MG/1
20 TABLET, FILM COATED ORAL DAILY
Status: DISCONTINUED | OUTPATIENT
Start: 2024-05-31 | End: 2024-06-08

## 2024-05-30 RX ORDER — OXYCODONE HYDROCHLORIDE 5 MG/1
5 TABLET ORAL
Status: DISCONTINUED | OUTPATIENT
Start: 2024-05-30 | End: 2024-05-31

## 2024-05-30 RX ORDER — ONDANSETRON 4 MG/1
4 TABLET, ORALLY DISINTEGRATING ORAL EVERY 4 HOURS PRN
Status: DISCONTINUED | OUTPATIENT
Start: 2024-05-30 | End: 2024-06-11 | Stop reason: HOSPADM

## 2024-05-30 RX ORDER — AMLODIPINE BESYLATE 10 MG/1
10 TABLET ORAL DAILY
Status: DISCONTINUED | OUTPATIENT
Start: 2024-05-31 | End: 2024-06-11 | Stop reason: HOSPADM

## 2024-05-30 RX ORDER — GABAPENTIN 100 MG/1
100 CAPSULE ORAL 3 TIMES DAILY
Status: DISCONTINUED | OUTPATIENT
Start: 2024-05-30 | End: 2024-06-11 | Stop reason: HOSPADM

## 2024-05-30 RX ORDER — LEVOTHYROXINE SODIUM 100 UG/1
100 TABLET ORAL
Status: DISCONTINUED | OUTPATIENT
Start: 2024-05-31 | End: 2024-06-11 | Stop reason: HOSPADM

## 2024-05-30 RX ORDER — FUROSEMIDE 10 MG/ML
40 INJECTION INTRAMUSCULAR; INTRAVENOUS EVERY 8 HOURS
Status: DISCONTINUED | OUTPATIENT
Start: 2024-05-30 | End: 2024-05-30

## 2024-05-30 RX ORDER — CARVEDILOL 12.5 MG/1
12.5 TABLET ORAL 2 TIMES DAILY WITH MEALS
Status: DISCONTINUED | OUTPATIENT
Start: 2024-05-30 | End: 2024-06-04

## 2024-05-30 RX ORDER — LABETALOL HYDROCHLORIDE 5 MG/ML
10 INJECTION, SOLUTION INTRAVENOUS EVERY 4 HOURS PRN
Status: DISCONTINUED | OUTPATIENT
Start: 2024-05-30 | End: 2024-06-04

## 2024-05-30 RX ORDER — ONDANSETRON 2 MG/ML
4 INJECTION INTRAMUSCULAR; INTRAVENOUS EVERY 4 HOURS PRN
Status: DISCONTINUED | OUTPATIENT
Start: 2024-05-30 | End: 2024-05-31

## 2024-05-30 RX ORDER — IPRATROPIUM BROMIDE AND ALBUTEROL SULFATE 2.5; .5 MG/3ML; MG/3ML
3 SOLUTION RESPIRATORY (INHALATION) ONCE
Status: DISPENSED | OUTPATIENT
Start: 2024-05-30 | End: 2024-05-31

## 2024-05-30 RX ORDER — PROMETHAZINE HYDROCHLORIDE 25 MG/1
12.5-25 SUPPOSITORY RECTAL EVERY 4 HOURS PRN
Status: DISCONTINUED | OUTPATIENT
Start: 2024-05-30 | End: 2024-06-11 | Stop reason: HOSPADM

## 2024-05-30 RX ORDER — HYDROMORPHONE HYDROCHLORIDE 1 MG/ML
0.5 INJECTION, SOLUTION INTRAMUSCULAR; INTRAVENOUS; SUBCUTANEOUS
Status: DISCONTINUED | OUTPATIENT
Start: 2024-05-30 | End: 2024-05-31

## 2024-05-30 RX ORDER — PROCHLORPERAZINE EDISYLATE 5 MG/ML
5-10 INJECTION INTRAMUSCULAR; INTRAVENOUS EVERY 4 HOURS PRN
Status: DISCONTINUED | OUTPATIENT
Start: 2024-05-30 | End: 2024-06-11 | Stop reason: HOSPADM

## 2024-05-30 RX ORDER — PROMETHAZINE HYDROCHLORIDE 25 MG/1
12.5-25 TABLET ORAL EVERY 4 HOURS PRN
Status: DISCONTINUED | OUTPATIENT
Start: 2024-05-30 | End: 2024-06-11 | Stop reason: HOSPADM

## 2024-05-30 RX ADMIN — IOHEXOL 100 ML: 350 INJECTION, SOLUTION INTRAVENOUS at 14:54

## 2024-05-30 RX ADMIN — PIPERACILLIN AND TAZOBACTAM 3.38 G: 3; .375 INJECTION, POWDER, FOR SOLUTION INTRAVENOUS at 17:30

## 2024-05-30 RX ADMIN — OXYCODONE HYDROCHLORIDE 10 MG: 10 TABLET ORAL at 22:32

## 2024-05-30 RX ADMIN — METHOCARBAMOL TABLETS 500 MG: 500 TABLET, COATED ORAL at 22:08

## 2024-05-30 RX ADMIN — CARVEDILOL 12.5 MG: 12.5 TABLET, FILM COATED ORAL at 19:49

## 2024-05-30 RX ADMIN — PIPERACILLIN AND TAZOBACTAM 3.38 G: 3; .375 INJECTION, POWDER, FOR SOLUTION INTRAVENOUS at 22:12

## 2024-05-30 RX ADMIN — FUROSEMIDE 40 MG: 10 INJECTION INTRAMUSCULAR; INTRAVENOUS at 19:49

## 2024-05-30 RX ADMIN — GABAPENTIN 100 MG: 100 CAPSULE ORAL at 19:48

## 2024-05-30 RX ADMIN — VANCOMYCIN HYDROCHLORIDE 3 G: 5 INJECTION, POWDER, LYOPHILIZED, FOR SOLUTION INTRAVENOUS at 15:33

## 2024-05-30 ASSESSMENT — COGNITIVE AND FUNCTIONAL STATUS - GENERAL
STANDING UP FROM CHAIR USING ARMS: A LITTLE
WALKING IN HOSPITAL ROOM: A LITTLE
MOVING TO AND FROM BED TO CHAIR: A LITTLE
SUGGESTED CMS G CODE MODIFIER DAILY ACTIVITY: CJ
TURNING FROM BACK TO SIDE WHILE IN FLAT BAD: A LITTLE
DRESSING REGULAR UPPER BODY CLOTHING: A LITTLE
SUGGESTED CMS G CODE MODIFIER MOBILITY: CK
CLIMB 3 TO 5 STEPS WITH RAILING: A LOT
DAILY ACTIVITIY SCORE: 22
DRESSING REGULAR LOWER BODY CLOTHING: A LITTLE
MOBILITY SCORE: 18

## 2024-05-30 ASSESSMENT — SOCIAL DETERMINANTS OF HEALTH (SDOH)
WITHIN THE PAST 12 MONTHS, THE FOOD YOU BOUGHT JUST DIDN'T LAST AND YOU DIDN'T HAVE MONEY TO GET MORE: NEVER TRUE
IN THE PAST 12 MONTHS, HAS THE ELECTRIC, GAS, OIL, OR WATER COMPANY THREATENED TO SHUT OFF SERVICE IN YOUR HOME?: NO
WITHIN THE LAST YEAR, HAVE TO BEEN RAPED OR FORCED TO HAVE ANY KIND OF SEXUAL ACTIVITY BY YOUR PARTNER OR EX-PARTNER?: NO
WITHIN THE LAST YEAR, HAVE YOU BEEN HUMILIATED OR EMOTIONALLY ABUSED IN OTHER WAYS BY YOUR PARTNER OR EX-PARTNER?: NO
WITHIN THE LAST YEAR, HAVE YOU BEEN AFRAID OF YOUR PARTNER OR EX-PARTNER?: NO
WITHIN THE PAST 12 MONTHS, YOU WORRIED THAT YOUR FOOD WOULD RUN OUT BEFORE YOU GOT THE MONEY TO BUY MORE: NEVER TRUE
WITHIN THE LAST YEAR, HAVE YOU BEEN KICKED, HIT, SLAPPED, OR OTHERWISE PHYSICALLY HURT BY YOUR PARTNER OR EX-PARTNER?: NO

## 2024-05-30 ASSESSMENT — ENCOUNTER SYMPTOMS
NAUSEA: 1
SHORTNESS OF BREATH: 1
PND: 1
PALPITATIONS: 1
WHEEZING: 1
COUGH: 1
DIZZINESS: 1
CHILLS: 1
ORTHOPNEA: 1
FEVER: 1

## 2024-05-30 ASSESSMENT — LIFESTYLE VARIABLES
HAVE YOU EVER FELT YOU SHOULD CUT DOWN ON YOUR DRINKING: YES
DOES PATIENT WANT TO STOP DRINKING: CANNOT ASSESS
CONSUMPTION TOTAL: NEGATIVE
TOTAL SCORE: 1
ON A TYPICAL DAY WHEN YOU DRINK ALCOHOL HOW MANY DRINKS DO YOU HAVE: 0
HOW MANY TIMES IN THE PAST YEAR HAVE YOU HAD 5 OR MORE DRINKS IN A DAY: 0
EVER HAD A DRINK FIRST THING IN THE MORNING TO STEADY YOUR NERVES TO GET RID OF A HANGOVER: NO
TOTAL SCORE: 1
ALCOHOL_USE: YES
TOTAL SCORE: 1
EVER FELT BAD OR GUILTY ABOUT YOUR DRINKING: NO
AVERAGE NUMBER OF DAYS PER WEEK YOU HAVE A DRINK CONTAINING ALCOHOL: 0
HAVE PEOPLE ANNOYED YOU BY CRITICIZING YOUR DRINKING: NO

## 2024-05-30 ASSESSMENT — FIBROSIS 4 INDEX
FIB4 SCORE: 1.72
FIB4 SCORE: 1.4
FIB4 SCORE: 1.4

## 2024-05-30 ASSESSMENT — PAIN DESCRIPTION - PAIN TYPE: TYPE: ACUTE PAIN

## 2024-05-30 NOTE — ED PROVIDER NOTES
ED Provider Note    CHIEF COMPLAINT  Chief Complaint   Patient presents with    Chest Pain     Initial complaint.  Currently 0/10.     Shortness of Breath    Dizziness    Groin Pain     With testicular edema.    Wound Infection     Possible, at surgical site supra pubic region.         EXTERNAL RECORDS REVIEWED  Inpatient Notes admitted 9 days prior for aortic dissection and had endovascular repair of type B dissection as well as bilateral iliac stents    HPI/ROS  LIMITATION TO HISTORY   Select: : None  OUTSIDE HISTORIAN(S):      Rene Kelley is a 53 y.o. male who presents to the emergency department chest pain.  Patient reports a stabbing in his chest that radiated into his shoulder blades bilaterally.  Some minimal shortness of breath.  Patient also reports that he has been having chills and slight subjective fevers.  He reports that he was told that his wounds may be infected.  Moderate pain in bilateral inguinal areas minor nausea no vomiting no other acute symptom change or concern at this time.    PAST MEDICAL HISTORY   Hypertension, obesity, substance abuse    SURGICAL HISTORY   has a past surgical history that includes abdominal aortic aneurysm (Bilateral, 5/21/2024); echocardiogram, transesophageal, intraoperative (N/A, 5/21/2024); and femoral femoral bypass (Bilateral, 5/21/2024).    FAMILY HISTORY  History reviewed. No pertinent family history.    SOCIAL HISTORY  Social History     Tobacco Use    Smoking status: Every Day     Current packs/day: 5.00     Average packs/day: 5.0 packs/day (0.1 ttl pk-yrs)     Types: Cigarettes     Start date: 5/20/2024    Smokeless tobacco: Former    Tobacco comments:     Pt reports 1-2 cigs per day.    Vaping Use    Vaping status: Never Used   Substance and Sexual Activity    Alcohol use: Yes     Comment: OCC beer    Drug use: Never    Sexual activity: Not on file       CURRENT MEDICATIONS  Home Medications    **Home medications have not yet been reviewed for this  "encounter**         ALLERGIES  No Known Allergies    PHYSICAL EXAM  VITAL SIGNS: /65   Pulse 69   Temp 37.2 °C (98.9 °F) (Oral)   Resp (!) 23   Ht 1.778 m (5' 10\")   Wt (!) 145 kg (320 lb)   SpO2 97%   BMI 45.92 kg/m²      Pulse ox interpretation: I interpret this pulse ox as normal.  Constitutional: Alert and oriented x 3, moderate distress  HEENT: Atraumatic normocephalic, pupils are equal round reactive to light extraocular movements are intact. The nares is clear, external ears are normal, mouth shows moist mucous membranes normal dentition for age  Neck: Supple, no JVD no tracheal deviation  Cardiovascular: Regular rate and rhythm no murmur rub or gallop 2+ pulses peripherally x4  Thorax & Lungs: No respiratory distress, no wheezes rales or rhonchi, No chest tenderness.   GI: Morbidly obese, bilateral inguinal surgical sites are erythematous indurated and warm.  The left also has central fluctuance and large amount of swelling.  Skin: Warm dry no acute rash or lesion  Musculoskeletal: Moving all extremities with full range and 5 of 5 strength no acute  deformity  Neurologic: Cranial nerves III through XII are grossly intact no sensory deficit no cerebellar dysfunction   Psychiatric: Appropriate affect for situation at this time      EKG/LABS  Results for orders placed or performed during the hospital encounter of 05/30/24   Lactic Acid   Result Value Ref Range    Lactic Acid 1.1 0.5 - 2.0 mmol/L   CBC with Differential   Result Value Ref Range    WBC 11.4 (H) 4.8 - 10.8 K/uL    RBC 4.21 (L) 4.70 - 6.10 M/uL    Hemoglobin 13.6 (L) 14.0 - 18.0 g/dL    Hematocrit 40.3 (L) 42.0 - 52.0 %    MCV 95.7 81.4 - 97.8 fL    MCH 32.3 27.0 - 33.0 pg    MCHC 33.7 32.3 - 36.5 g/dL    RDW 48.9 35.9 - 50.0 fL    Platelet Count 231 164 - 446 K/uL    MPV 9.9 9.0 - 12.9 fL    Neutrophils-Polys 80.20 (H) 44.00 - 72.00 %    Lymphocytes 7.70 (L) 22.00 - 41.00 %    Monocytes 6.00 0.00 - 13.40 %    Eosinophils 0.90 0.00 - " 6.90 %    Basophils 0.90 0.00 - 1.80 %    Nucleated RBC 0.00 0.00 - 0.20 /100 WBC    Neutrophils (Absolute) 9.14 (H) 1.82 - 7.42 K/uL    Lymphs (Absolute) 0.88 (L) 1.00 - 4.80 K/uL    Monos (Absolute) 0.68 0.00 - 0.85 K/uL    Eos (Absolute) 0.10 0.00 - 0.51 K/uL    Baso (Absolute) 0.10 0.00 - 0.12 K/uL    NRBC (Absolute) 0.00 K/uL   Complete Metabolic Panel   Result Value Ref Range    Sodium 132 (L) 135 - 145 mmol/L    Potassium 4.0 3.6 - 5.5 mmol/L    Chloride 99 96 - 112 mmol/L    Co2 20 20 - 33 mmol/L    Anion Gap 13.0 7.0 - 16.0    Glucose 81 65 - 99 mg/dL    Bun 21 8 - 22 mg/dL    Creatinine 1.01 0.50 - 1.40 mg/dL    Calcium 8.2 (L) 8.5 - 10.5 mg/dL    Correct Calcium 9.2 8.5 - 10.5 mg/dL    AST(SGOT) 33 12 - 45 U/L    ALT(SGPT) 28 2 - 50 U/L    Alkaline Phosphatase 93 30 - 99 U/L    Total Bilirubin 1.6 (H) 0.1 - 1.5 mg/dL    Albumin 2.8 (L) 3.2 - 4.9 g/dL    Total Protein 6.2 6.0 - 8.2 g/dL    Globulin 3.4 1.9 - 3.5 g/dL    A-G Ratio 0.8 g/dL   Troponin   Result Value Ref Range    Troponin T 50 (H) 6 - 19 ng/L   APTT   Result Value Ref Range    APTT 30.3 24.7 - 36.0 sec   Prothrombin Time   Result Value Ref Range    PT 15.8 (H) 12.0 - 14.6 sec    INR 1.25 (H) 0.87 - 1.13   ESTIMATED GFR   Result Value Ref Range    GFR (CKD-EPI) 89 >60 mL/min/1.73 m 2   DIFFERENTIAL MANUAL   Result Value Ref Range    Metamyelocytes 1.70 %    Myelocytes 2.60 %    Manual Diff Status PERFORMED    PERIPHERAL SMEAR REVIEW   Result Value Ref Range    Peripheral Smear Review see below    PLATELET ESTIMATE   Result Value Ref Range    Plt Estimation Normal    MORPHOLOGY   Result Value Ref Range    RBC Morphology Normal       I have independently interpreted this EKG    RADIOLOGY/PROCEDURES   I have independently interpreted the diagnostic imaging associated with this visit and am waiting the final reading from the radiologist.   My preliminary interpretation is as follows:   No obvious new dissection flaps appear stable, probable left  inguinal abscess.    Radiologist interpretation:  DX-CHEST-PORTABLE (1 VIEW)   Final Result      1.  Trace LEFT pleural effusion   2.  Decreased LEFT peripheral pulmonary opacity which could be atelectasis or pneumonia      CT-CTA COMPLETE THORACOABDOMINAL AORTA   Final Result      1.  Type B aortic dissection status post thoracic stent graft. Dissection flap is unchanged extending from just beyond the left subclavian artery origin into the bilateral external iliac arteries.   2.  Proximal descending thoracic aortic aneurysm measuring 4.5 x 4.5 cm.   3.  Status post bilateral common iliac artery stent placement and femorofemoral bypass. Stents and distal femoral arteries appear patent.   4.  Mild stranding and multiple foci of gas in the lower anterior abdominal wall without focal drainable fluid collection just chest abscess.   5.  Redemonstrated extension of the abdominal aortic dissection into the celiac artery which supplied by both the true and false lumen. Distal vessels appear patent.   6.  Diffuse bilateral tree-in-bud opacities which could be seen in setting edema and/or infection.   7.  15 mm lingular pulmonary nodule.   8.  Trace left pleural effusion.   9.  Nonobstructive bilateral renal calculi.      Otilio Type B aortic dissection      Fleischner Society pulmonary nodule recommendations:   Low and High Risk: Consider CT at 3 months, PET/CT, or tissue sampling.      Low Risk - Minimal or absent history of smoking and of other known risk factors.      High Risk - History of smoking or of other known risk factors.      Note: These recommendations do not apply to lung cancer screening, patients with immunosuppression, or patients with known primary cancer.      Fleischner Society 2017 Guidelines for Management of Incidentally Detected Pulmonary Nodules in Adults                   COURSE & MEDICAL DECISION MAKING        Abscess drainage Procedure Note    Indication: Abscess    Procedure: The patient was  "positioned appropriately and the skin over the incision site was prepped with chlorhexidine. Local anesthesia was not performed at the patient's request.  The left inguinal incision site had staples removed from the medial half and approximately 20 cc of foul smelling and purulent material was expressed. Loculations were broken up using forceps and more of the material was able to be expressed. The drainage cavity was then irrigated. The patient’s tetanus status was up to date and did not require a booster dose.    The patient tolerated the procedure well.    Complications: None    ASSESSMENT, COURSE AND PLAN  Care Narrative: Patient has staples removed from left surgical site and large amount of purulence was expressed.  Wound culture was sent to the lab for evaluation.  Patient had previously been given broad-spectrum antibiotics.  Labs are actually fairly reassuring and CAT scan does not show any concerning features of his previous graft or aortic dissection.  Patient is wound infections will require ongoing evaluation and treatment likely ongoing IV antibiotics.  I discussed this with Dr. Alcantara have also discussed it with hospitalist Dr. Wall patient admitted in guarded condition.  /65   Pulse 69   Temp 37.2 °C (98.9 °F) (Oral)   Resp (!) 23   Ht 1.778 m (5' 10\")   Wt (!) 145 kg (320 lb)   SpO2 97%   BMI 45.92 kg/m²       FINAL DIAGNOSIS  1. Infection of superficial incisional surgical site after procedure, initial encounter Active          Electronically signed by: Myron Gilbert M.D.      "

## 2024-05-30 NOTE — ED NOTES
ERP at bedside - removed staples left groin incision.  Wound sample obtained - sent to lab.  Pt tolerated well.

## 2024-05-30 NOTE — ED TRIAGE NOTES
".  Chief Complaint   Patient presents with    Chest Pain     Initial complaint.  Currently 0/10.     Shortness of Breath    Dizziness    Groin Pain     With testicular edema.    Wound Infection     Possible, at surgical site supra pubic region.     Pt [facility]IB [facility].  Pt recently discharged(5/26) from [facility] - DX \" Aortic Dissection. \"  Pt reports sudden onset chest pain \" as soon as I sat up in bed \" at 0700.  Substernal chest pain at onset 8/10.  SOB and dizziness upon exertion.  No N/V at this time.  Staples at surgical sites groin region intact.  + erythema/edema/yellow discharge noted.  FSBS PTA 80.  Pt received LR 400ml PTA.  Pt not on home oxygen.           "

## 2024-05-31 ENCOUNTER — APPOINTMENT (OUTPATIENT)
Dept: RADIOLOGY | Facility: MEDICAL CENTER | Age: 54
DRG: 252 | End: 2024-05-31
Attending: SURGERY
Payer: COMMERCIAL

## 2024-05-31 ENCOUNTER — PATIENT OUTREACH (OUTPATIENT)
Dept: SCHEDULING | Facility: IMAGING CENTER | Age: 54
End: 2024-05-31
Payer: COMMERCIAL

## 2024-05-31 ENCOUNTER — ANESTHESIA EVENT (OUTPATIENT)
Dept: SURGERY | Facility: MEDICAL CENTER | Age: 54
End: 2024-05-31
Payer: COMMERCIAL

## 2024-05-31 ENCOUNTER — ANESTHESIA (OUTPATIENT)
Dept: SURGERY | Facility: MEDICAL CENTER | Age: 54
End: 2024-05-31
Payer: COMMERCIAL

## 2024-05-31 VITALS
BODY MASS INDEX: 45.1 KG/M2 | WEIGHT: 315 LBS | RESPIRATION RATE: 18 BRPM | HEIGHT: 70 IN | HEART RATE: 60 BPM | SYSTOLIC BLOOD PRESSURE: 102 MMHG | DIASTOLIC BLOOD PRESSURE: 64 MMHG | OXYGEN SATURATION: 91 % | TEMPERATURE: 98.1 F

## 2024-05-31 LAB
ABO GROUP BLD: NORMAL
ALBUMIN SERPL BCP-MCNC: 2.5 G/DL (ref 3.2–4.9)
ALBUMIN/GLOB SERPL: 0.8 G/DL
ALP SERPL-CCNC: 89 U/L (ref 30–99)
ALT SERPL-CCNC: 26 U/L (ref 2–50)
ANION GAP SERPL CALC-SCNC: 10 MMOL/L (ref 7–16)
AST SERPL-CCNC: 29 U/L (ref 12–45)
BACTERIA BLD CULT: NORMAL
BACTERIA BLD CULT: NORMAL
BACTERIA UR CULT: NORMAL
BACTERIA WND AEROBE CULT: NORMAL
BASOPHILS # BLD AUTO: 1.1 % (ref 0–1.8)
BASOPHILS # BLD: 0.11 K/UL (ref 0–0.12)
BILIRUB SERPL-MCNC: 1.3 MG/DL (ref 0.1–1.5)
BLD GP AB SCN SERPL QL: NORMAL
BUN SERPL-MCNC: 23 MG/DL (ref 8–22)
CALCIUM ALBUM COR SERPL-MCNC: 8.9 MG/DL (ref 8.5–10.5)
CALCIUM SERPL-MCNC: 7.7 MG/DL (ref 8.5–10.5)
CHLORIDE SERPL-SCNC: 100 MMOL/L (ref 96–112)
CHOLEST SERPL-MCNC: 90 MG/DL (ref 100–199)
CO2 SERPL-SCNC: 22 MMOL/L (ref 20–33)
CREAT SERPL-MCNC: 1.09 MG/DL (ref 0.5–1.4)
EKG IMPRESSION: NORMAL
EOSINOPHIL # BLD AUTO: 0.19 K/UL (ref 0–0.51)
EOSINOPHIL NFR BLD: 1.9 % (ref 0–6.9)
ERYTHROCYTE [DISTWIDTH] IN BLOOD BY AUTOMATED COUNT: 49.7 FL (ref 35.9–50)
ERYTHROCYTE [SEDIMENTATION RATE] IN BLOOD BY WESTERGREN METHOD: 16 MM/HOUR (ref 0–20)
GFR SERPLBLD CREATININE-BSD FMLA CKD-EPI: 81 ML/MIN/1.73 M 2
GLOBULIN SER CALC-MCNC: 3.3 G/DL (ref 1.9–3.5)
GLUCOSE SERPL-MCNC: 104 MG/DL (ref 65–99)
GRAM STN SPEC: NORMAL
HCT VFR BLD AUTO: 39.1 % (ref 42–52)
HDLC SERPL-MCNC: 21 MG/DL
HGB BLD-MCNC: 13.3 G/DL (ref 14–18)
IMM GRANULOCYTES # BLD AUTO: 0.42 K/UL (ref 0–0.11)
IMM GRANULOCYTES NFR BLD AUTO: 4.1 % (ref 0–0.9)
LDLC SERPL CALC-MCNC: 53 MG/DL
LYMPHOCYTES # BLD AUTO: 0.75 K/UL (ref 1–4.8)
LYMPHOCYTES NFR BLD: 7.4 % (ref 22–41)
MAGNESIUM SERPL-MCNC: 1.7 MG/DL (ref 1.5–2.5)
MCH RBC QN AUTO: 32.7 PG (ref 27–33)
MCHC RBC AUTO-ENTMCNC: 34 G/DL (ref 32.3–36.5)
MCV RBC AUTO: 96.1 FL (ref 81.4–97.8)
MONOCYTES # BLD AUTO: 1.19 K/UL (ref 0–0.85)
MONOCYTES NFR BLD AUTO: 11.7 % (ref 0–13.4)
NEUTROPHILS # BLD AUTO: 7.54 K/UL (ref 1.82–7.42)
NEUTROPHILS NFR BLD: 73.8 % (ref 44–72)
NRBC # BLD AUTO: 0 K/UL
NRBC BLD-RTO: 0 /100 WBC (ref 0–0.2)
NT-PROBNP SERPL IA-MCNC: 1178 PG/ML (ref 0–125)
PLATELET # BLD AUTO: 204 K/UL (ref 164–446)
PMV BLD AUTO: 9.7 FL (ref 9–12.9)
POTASSIUM SERPL-SCNC: 3.6 MMOL/L (ref 3.6–5.5)
PROT SERPL-MCNC: 5.8 G/DL (ref 6–8.2)
RBC # BLD AUTO: 4.07 M/UL (ref 4.7–6.1)
RH BLD: NORMAL
SIGNIFICANT IND 70042: NORMAL
SITE SITE: NORMAL
SODIUM SERPL-SCNC: 132 MMOL/L (ref 135–145)
SOURCE SOURCE: NORMAL
TRIGL SERPL-MCNC: 80 MG/DL (ref 0–149)
WBC # BLD AUTO: 10.2 K/UL (ref 4.8–10.8)

## 2024-05-31 PROCEDURE — 160002 HCHG RECOVERY MINUTES (STAT): Performed by: SURGERY

## 2024-05-31 PROCEDURE — 83880 ASSAY OF NATRIURETIC PEPTIDE: CPT

## 2024-05-31 PROCEDURE — 110454 HCHG SHELL REV 250: Performed by: SURGERY

## 2024-05-31 PROCEDURE — 502000 HCHG MISC OR IMPLANTS RC 0278: Performed by: SURGERY

## 2024-05-31 PROCEDURE — 35903 EXCISION GRAFT EXTREMITY: CPT | Mod: 59,RT | Performed by: SURGERY

## 2024-05-31 PROCEDURE — 160035 HCHG PACU - 1ST 60 MINS PHASE I: Performed by: SURGERY

## 2024-05-31 PROCEDURE — 700105 HCHG RX REV CODE 258: Performed by: HOSPITALIST

## 2024-05-31 PROCEDURE — A9270 NON-COVERED ITEM OR SERVICE: HCPCS | Performed by: HOSPITALIST

## 2024-05-31 PROCEDURE — 110371 HCHG SHELL REV 272: Performed by: SURGERY

## 2024-05-31 PROCEDURE — 047D04Z DILATION OF LEFT COMMON ILIAC ARTERY WITH DRUG-ELUTING INTRALUMINAL DEVICE, OPEN APPROACH: ICD-10-PCS | Performed by: SURGERY

## 2024-05-31 PROCEDURE — 37253 INTRVASC US NONCORONARY ADDL: CPT | Performed by: SURGERY

## 2024-05-31 PROCEDURE — 160048 HCHG OR STATISTICAL LEVEL 1-5: Performed by: SURGERY

## 2024-05-31 PROCEDURE — C1769 GUIDE WIRE: HCPCS | Performed by: SURGERY

## 2024-05-31 PROCEDURE — 80061 LIPID PANEL: CPT

## 2024-05-31 PROCEDURE — 047C04Z DILATION OF RIGHT COMMON ILIAC ARTERY WITH DRUG-ELUTING INTRALUMINAL DEVICE, OPEN APPROACH: ICD-10-PCS | Performed by: SURGERY

## 2024-05-31 PROCEDURE — 86901 BLOOD TYPING SEROLOGIC RH(D): CPT

## 2024-05-31 PROCEDURE — 36415 COLL VENOUS BLD VENIPUNCTURE: CPT

## 2024-05-31 PROCEDURE — 06BP3ZZ EXCISION OF RIGHT SAPHENOUS VEIN, PERCUTANEOUS APPROACH: ICD-10-PCS | Performed by: SURGERY

## 2024-05-31 PROCEDURE — 770020 HCHG ROOM/CARE - TELE (206)

## 2024-05-31 PROCEDURE — 97605 NEG PRS WND THER DME<=50SQCM: CPT | Performed by: SURGERY

## 2024-05-31 PROCEDURE — 86850 RBC ANTIBODY SCREEN: CPT

## 2024-05-31 PROCEDURE — 700102 HCHG RX REV CODE 250 W/ 637 OVERRIDE(OP): Performed by: SURGERY

## 2024-05-31 PROCEDURE — 86900 BLOOD TYPING SEROLOGIC ABO: CPT

## 2024-05-31 PROCEDURE — C1751 CATH, INF, PER/CENT/MIDLINE: HCPCS | Performed by: SURGERY

## 2024-05-31 PROCEDURE — 83735 ASSAY OF MAGNESIUM: CPT

## 2024-05-31 PROCEDURE — 700111 HCHG RX REV CODE 636 W/ 250 OVERRIDE (IP): Mod: JZ | Performed by: ANESTHESIOLOGY

## 2024-05-31 PROCEDURE — 37252 INTRVASC US NONCORONARY 1ST: CPT | Performed by: SURGERY

## 2024-05-31 PROCEDURE — B44HZZ3 ULTRASONOGRAPHY OF BILATERAL LOWER EXTREMITY ARTERIES, INTRAVASCULAR: ICD-10-PCS | Performed by: SURGERY

## 2024-05-31 PROCEDURE — 160042 HCHG SURGERY MINUTES - EA ADDL 1 MIN LEVEL 5: Performed by: SURGERY

## 2024-05-31 PROCEDURE — 502240 HCHG MISC OR SUPPLY RC 0272: Performed by: SURGERY

## 2024-05-31 PROCEDURE — 04UK07Z SUPPLEMENT RIGHT FEMORAL ARTERY WITH AUTOLOGOUS TISSUE SUBSTITUTE, OPEN APPROACH: ICD-10-PCS | Performed by: SURGERY

## 2024-05-31 PROCEDURE — C1753 CATH, INTRAVAS ULTRASOUND: HCPCS | Performed by: SURGERY

## 2024-05-31 PROCEDURE — 80053 COMPREHEN METABOLIC PANEL: CPT

## 2024-05-31 PROCEDURE — 93010 ELECTROCARDIOGRAM REPORT: CPT | Performed by: INTERNAL MEDICINE

## 2024-05-31 PROCEDURE — A9270 NON-COVERED ITEM OR SERVICE: HCPCS | Performed by: ANESTHESIOLOGY

## 2024-05-31 PROCEDURE — 700102 HCHG RX REV CODE 250 W/ 637 OVERRIDE(OP): Performed by: HOSPITALIST

## 2024-05-31 PROCEDURE — 35256 REPAIR BLVSL VN GRF LXTR: CPT | Mod: 59,RT | Performed by: SURGERY

## 2024-05-31 PROCEDURE — 700111 HCHG RX REV CODE 636 W/ 250 OVERRIDE (IP): Mod: JZ

## 2024-05-31 PROCEDURE — 85025 COMPLETE CBC W/AUTO DIFF WBC: CPT

## 2024-05-31 PROCEDURE — 35226 REPAIR BLOOD VESSEL DIR LXTR: CPT | Mod: 59,RT | Performed by: SURGERY

## 2024-05-31 PROCEDURE — 85652 RBC SED RATE AUTOMATED: CPT

## 2024-05-31 PROCEDURE — 37221 PR REVASCULARIZE ILIAC ARTERY,ANGIOPLASTY/ST*: CPT | Mod: 50 | Performed by: SURGERY

## 2024-05-31 PROCEDURE — 36200 PLACE CATHETER IN AORTA: CPT | Mod: 59 | Performed by: SURGERY

## 2024-05-31 PROCEDURE — 0KXQ0ZZ TRANSFER RIGHT UPPER LEG MUSCLE, OPEN APPROACH: ICD-10-PCS | Performed by: SURGERY

## 2024-05-31 PROCEDURE — 700102 HCHG RX REV CODE 250 W/ 637 OVERRIDE(OP): Performed by: ANESTHESIOLOGY

## 2024-05-31 PROCEDURE — C1876 STENT, NON-COA/NON-COV W/DEL: HCPCS | Performed by: SURGERY

## 2024-05-31 PROCEDURE — 04PY3KZ REMOVAL OF NONAUTOLOGOUS TISSUE SUBSTITUTE FROM LOWER ARTERY, PERCUTANEOUS APPROACH: ICD-10-PCS | Performed by: SURGERY

## 2024-05-31 PROCEDURE — 160031 HCHG SURGERY MINUTES - 1ST 30 MINS LEVEL 5: Performed by: SURGERY

## 2024-05-31 PROCEDURE — A9270 NON-COVERED ITEM OR SERVICE: HCPCS | Performed by: SURGERY

## 2024-05-31 PROCEDURE — C1894 INTRO/SHEATH, NON-LASER: HCPCS | Performed by: SURGERY

## 2024-05-31 PROCEDURE — 15738 MUSCLE-SKIN GRAFT LEG: CPT | Performed by: SURGERY

## 2024-05-31 PROCEDURE — 99233 SBSQ HOSP IP/OBS HIGH 50: CPT | Performed by: INTERNAL MEDICINE

## 2024-05-31 PROCEDURE — 700111 HCHG RX REV CODE 636 W/ 250 OVERRIDE (IP): Performed by: HOSPITALIST

## 2024-05-31 PROCEDURE — 93005 ELECTROCARDIOGRAM TRACING: CPT | Performed by: HOSPITALIST

## 2024-05-31 PROCEDURE — 700105 HCHG RX REV CODE 258: Performed by: SURGERY

## 2024-05-31 PROCEDURE — 700111 HCHG RX REV CODE 636 W/ 250 OVERRIDE (IP): Mod: JZ | Performed by: SURGERY

## 2024-05-31 PROCEDURE — 700117 HCHG RX CONTRAST REV CODE 255: Performed by: SURGERY

## 2024-05-31 PROCEDURE — 700101 HCHG RX REV CODE 250: Performed by: SURGERY

## 2024-05-31 PROCEDURE — 160009 HCHG ANES TIME/MIN: Performed by: SURGERY

## 2024-05-31 DEVICE — IMPLANTABLE DEVICE: Type: IMPLANTABLE DEVICE | Site: GROIN | Status: FUNCTIONAL

## 2024-05-31 RX ORDER — IODIXANOL 270 MG/ML
INJECTION, SOLUTION INTRAVASCULAR
Status: DISCONTINUED | OUTPATIENT
Start: 2024-05-31 | End: 2024-05-31 | Stop reason: HOSPADM

## 2024-05-31 RX ORDER — SCOLOPAMINE TRANSDERMAL SYSTEM 1 MG/1
1 PATCH, EXTENDED RELEASE TRANSDERMAL
Status: DISCONTINUED | OUTPATIENT
Start: 2024-05-31 | End: 2024-06-11 | Stop reason: HOSPADM

## 2024-05-31 RX ORDER — SODIUM CHLORIDE, SODIUM LACTATE, POTASSIUM CHLORIDE, CALCIUM CHLORIDE 600; 310; 30; 20 MG/100ML; MG/100ML; MG/100ML; MG/100ML
INJECTION, SOLUTION INTRAVENOUS
Status: DISCONTINUED | OUTPATIENT
Start: 2024-05-31 | End: 2024-05-31 | Stop reason: SURG

## 2024-05-31 RX ORDER — HYDROMORPHONE HYDROCHLORIDE 1 MG/ML
0.2 INJECTION, SOLUTION INTRAMUSCULAR; INTRAVENOUS; SUBCUTANEOUS
Status: DISCONTINUED | OUTPATIENT
Start: 2024-05-31 | End: 2024-05-31 | Stop reason: HOSPADM

## 2024-05-31 RX ORDER — HEPARIN SODIUM 5000 [USP'U]/ML
5000 INJECTION, SOLUTION INTRAVENOUS; SUBCUTANEOUS EVERY 8 HOURS
Status: DISCONTINUED | OUTPATIENT
Start: 2024-06-01 | End: 2024-06-11 | Stop reason: HOSPADM

## 2024-05-31 RX ORDER — MAGNESIUM HYDROXIDE 1200 MG/15ML
LIQUID ORAL
Status: COMPLETED | OUTPATIENT
Start: 2024-05-31 | End: 2024-05-31

## 2024-05-31 RX ORDER — IBUPROFEN 800 MG/1
800 TABLET, FILM COATED ORAL 3 TIMES DAILY PRN
Status: DISCONTINUED | OUTPATIENT
Start: 2024-06-03 | End: 2024-06-03

## 2024-05-31 RX ORDER — DIPHENHYDRAMINE HYDROCHLORIDE 50 MG/ML
12.5 INJECTION INTRAMUSCULAR; INTRAVENOUS
Status: DISCONTINUED | OUTPATIENT
Start: 2024-05-31 | End: 2024-05-31 | Stop reason: HOSPADM

## 2024-05-31 RX ORDER — SODIUM CHLORIDE 9 MG/ML
INJECTION, SOLUTION INTRAVENOUS CONTINUOUS
Status: DISCONTINUED | OUTPATIENT
Start: 2024-05-31 | End: 2024-05-31

## 2024-05-31 RX ORDER — DIPHENHYDRAMINE HYDROCHLORIDE 50 MG/ML
25 INJECTION INTRAMUSCULAR; INTRAVENOUS EVERY 6 HOURS PRN
Status: DISCONTINUED | OUTPATIENT
Start: 2024-05-31 | End: 2024-06-08

## 2024-05-31 RX ORDER — OXYCODONE HYDROCHLORIDE 10 MG/1
10 TABLET ORAL
Status: DISCONTINUED | OUTPATIENT
Start: 2024-05-31 | End: 2024-06-11 | Stop reason: HOSPADM

## 2024-05-31 RX ORDER — HYDROMORPHONE HYDROCHLORIDE 1 MG/ML
0.1 INJECTION, SOLUTION INTRAMUSCULAR; INTRAVENOUS; SUBCUTANEOUS
Status: DISCONTINUED | OUTPATIENT
Start: 2024-05-31 | End: 2024-05-31 | Stop reason: HOSPADM

## 2024-05-31 RX ORDER — HEPARIN SODIUM,PORCINE 1000/ML
VIAL (ML) INJECTION PRN
Status: DISCONTINUED | OUTPATIENT
Start: 2024-05-31 | End: 2024-05-31 | Stop reason: SURG

## 2024-05-31 RX ORDER — HALOPERIDOL 5 MG/ML
1 INJECTION INTRAMUSCULAR EVERY 6 HOURS PRN
Status: DISCONTINUED | OUTPATIENT
Start: 2024-05-31 | End: 2024-06-11 | Stop reason: HOSPADM

## 2024-05-31 RX ORDER — DEXAMETHASONE SODIUM PHOSPHATE 4 MG/ML
4 INJECTION, SOLUTION INTRA-ARTICULAR; INTRALESIONAL; INTRAMUSCULAR; INTRAVENOUS; SOFT TISSUE
Status: DISCONTINUED | OUTPATIENT
Start: 2024-05-31 | End: 2024-06-11 | Stop reason: HOSPADM

## 2024-05-31 RX ORDER — HYDROMORPHONE HYDROCHLORIDE 1 MG/ML
0.5 INJECTION, SOLUTION INTRAMUSCULAR; INTRAVENOUS; SUBCUTANEOUS
Status: DISCONTINUED | OUTPATIENT
Start: 2024-05-31 | End: 2024-06-11 | Stop reason: HOSPADM

## 2024-05-31 RX ORDER — OXYCODONE HCL 5 MG/5 ML
5 SOLUTION, ORAL ORAL
Status: COMPLETED | OUTPATIENT
Start: 2024-05-31 | End: 2024-05-31

## 2024-05-31 RX ORDER — SODIUM CHLORIDE, SODIUM LACTATE, POTASSIUM CHLORIDE, CALCIUM CHLORIDE 600; 310; 30; 20 MG/100ML; MG/100ML; MG/100ML; MG/100ML
INJECTION, SOLUTION INTRAVENOUS CONTINUOUS
Status: DISCONTINUED | OUTPATIENT
Start: 2024-05-31 | End: 2024-05-31 | Stop reason: HOSPADM

## 2024-05-31 RX ORDER — HYDROMORPHONE HYDROCHLORIDE 1 MG/ML
0.4 INJECTION, SOLUTION INTRAMUSCULAR; INTRAVENOUS; SUBCUTANEOUS
Status: DISCONTINUED | OUTPATIENT
Start: 2024-05-31 | End: 2024-05-31 | Stop reason: HOSPADM

## 2024-05-31 RX ORDER — KETOROLAC TROMETHAMINE 15 MG/ML
15 INJECTION, SOLUTION INTRAMUSCULAR; INTRAVENOUS EVERY 6 HOURS
Status: DISCONTINUED | OUTPATIENT
Start: 2024-05-31 | End: 2024-06-03

## 2024-05-31 RX ORDER — VECURONIUM BROMIDE 1 MG/ML
INJECTION, POWDER, LYOPHILIZED, FOR SOLUTION INTRAVENOUS PRN
Status: DISCONTINUED | OUTPATIENT
Start: 2024-05-31 | End: 2024-05-31 | Stop reason: SURG

## 2024-05-31 RX ORDER — ONDANSETRON 2 MG/ML
4 INJECTION INTRAMUSCULAR; INTRAVENOUS
Status: DISCONTINUED | OUTPATIENT
Start: 2024-05-31 | End: 2024-05-31 | Stop reason: HOSPADM

## 2024-05-31 RX ORDER — OXYCODONE HYDROCHLORIDE 5 MG/1
5 TABLET ORAL
Status: DISCONTINUED | OUTPATIENT
Start: 2024-05-31 | End: 2024-06-11 | Stop reason: HOSPADM

## 2024-05-31 RX ORDER — ONDANSETRON 2 MG/ML
4 INJECTION INTRAMUSCULAR; INTRAVENOUS EVERY 4 HOURS PRN
Status: DISCONTINUED | OUTPATIENT
Start: 2024-05-31 | End: 2024-06-11 | Stop reason: HOSPADM

## 2024-05-31 RX ORDER — ACETAMINOPHEN 500 MG
1000 TABLET ORAL EVERY 6 HOURS
Status: DISPENSED | OUTPATIENT
Start: 2024-05-31 | End: 2024-06-05

## 2024-05-31 RX ORDER — ACETAMINOPHEN 500 MG
1000 TABLET ORAL EVERY 6 HOURS PRN
Status: DISCONTINUED | OUTPATIENT
Start: 2024-06-05 | End: 2024-06-11 | Stop reason: HOSPADM

## 2024-05-31 RX ORDER — MEPERIDINE HYDROCHLORIDE 25 MG/ML
6.25 INJECTION INTRAMUSCULAR; INTRAVENOUS; SUBCUTANEOUS
Status: DISCONTINUED | OUTPATIENT
Start: 2024-05-31 | End: 2024-05-31 | Stop reason: HOSPADM

## 2024-05-31 RX ORDER — HALOPERIDOL 5 MG/ML
1 INJECTION INTRAMUSCULAR
Status: DISCONTINUED | OUTPATIENT
Start: 2024-05-31 | End: 2024-05-31 | Stop reason: HOSPADM

## 2024-05-31 RX ORDER — OXYCODONE HCL 5 MG/5 ML
10 SOLUTION, ORAL ORAL
Status: COMPLETED | OUTPATIENT
Start: 2024-05-31 | End: 2024-05-31

## 2024-05-31 RX ADMIN — ACETAMINOPHEN 1000 MG: 500 TABLET, FILM COATED ORAL at 17:40

## 2024-05-31 RX ADMIN — GABAPENTIN 100 MG: 100 CAPSULE ORAL at 05:21

## 2024-05-31 RX ADMIN — VANCOMYCIN HYDROCHLORIDE 1750 MG: 5 INJECTION, POWDER, LYOPHILIZED, FOR SOLUTION INTRAVENOUS at 03:09

## 2024-05-31 RX ADMIN — METHOCARBAMOL TABLETS 500 MG: 500 TABLET, COATED ORAL at 16:12

## 2024-05-31 RX ADMIN — SODIUM CHLORIDE, POTASSIUM CHLORIDE, SODIUM LACTATE AND CALCIUM CHLORIDE: 600; 310; 30; 20 INJECTION, SOLUTION INTRAVENOUS at 10:56

## 2024-05-31 RX ADMIN — AMLODIPINE BESYLATE 10 MG: 10 TABLET ORAL at 05:21

## 2024-05-31 RX ADMIN — FUROSEMIDE 40 MG: 10 INJECTION INTRAMUSCULAR; INTRAVENOUS at 05:21

## 2024-05-31 RX ADMIN — OXYCODONE HYDROCHLORIDE 10 MG: 10 TABLET ORAL at 05:26

## 2024-05-31 RX ADMIN — ROCURONIUM BROMIDE 50 MG: 10 INJECTION, SOLUTION INTRAVENOUS at 10:52

## 2024-05-31 RX ADMIN — ROCURONIUM BROMIDE 25 MG: 10 INJECTION, SOLUTION INTRAVENOUS at 13:01

## 2024-05-31 RX ADMIN — FAMOTIDINE 20 MG: 20 TABLET, FILM COATED ORAL at 05:21

## 2024-05-31 RX ADMIN — NICOTINE TRANSDERMAL SYSTEM 21 MG: 21 PATCH, EXTENDED RELEASE TRANSDERMAL at 05:21

## 2024-05-31 RX ADMIN — OXYCODONE HYDROCHLORIDE 10 MG: 10 TABLET ORAL at 16:12

## 2024-05-31 RX ADMIN — OXYCODONE HYDROCHLORIDE 10 MG: 5 SOLUTION ORAL at 15:31

## 2024-05-31 RX ADMIN — LEVOTHYROXINE SODIUM 100 MCG: 0.1 TABLET ORAL at 05:21

## 2024-05-31 RX ADMIN — KETOROLAC TROMETHAMINE 15 MG: 15 INJECTION, SOLUTION INTRAMUSCULAR; INTRAVENOUS at 17:40

## 2024-05-31 RX ADMIN — METHOCARBAMOL TABLETS 500 MG: 500 TABLET, COATED ORAL at 08:33

## 2024-05-31 RX ADMIN — CARVEDILOL 12.5 MG: 12.5 TABLET, FILM COATED ORAL at 07:41

## 2024-05-31 RX ADMIN — PIPERACILLIN AND TAZOBACTAM 3.38 G: 3; .375 INJECTION, POWDER, FOR SOLUTION INTRAVENOUS at 21:46

## 2024-05-31 RX ADMIN — VECURONIUM BROMIDE 10 MG: 1 INJECTION, POWDER, LYOPHILIZED, FOR SOLUTION INTRAVENOUS at 11:27

## 2024-05-31 RX ADMIN — SUGAMMADEX 200 MG: 100 INJECTION, SOLUTION INTRAVENOUS at 15:09

## 2024-05-31 RX ADMIN — CARVEDILOL 12.5 MG: 12.5 TABLET, FILM COATED ORAL at 16:12

## 2024-05-31 RX ADMIN — FENTANYL CITRATE 50 MCG: 50 INJECTION, SOLUTION INTRAMUSCULAR; INTRAVENOUS at 15:13

## 2024-05-31 RX ADMIN — GABAPENTIN 100 MG: 100 CAPSULE ORAL at 16:13

## 2024-05-31 RX ADMIN — Medication 100 MG: at 10:52

## 2024-05-31 RX ADMIN — PIPERACILLIN AND TAZOBACTAM 3.38 G: 3; .375 INJECTION, POWDER, FOR SOLUTION INTRAVENOUS at 05:31

## 2024-05-31 RX ADMIN — METHOCARBAMOL TABLETS 500 MG: 500 TABLET, COATED ORAL at 21:38

## 2024-05-31 RX ADMIN — HEPARIN SODIUM 4000 UNITS: 1000 INJECTION, SOLUTION INTRAVENOUS; SUBCUTANEOUS at 12:34

## 2024-05-31 RX ADMIN — HEPARIN SODIUM 10000 UNITS: 1000 INJECTION, SOLUTION INTRAVENOUS; SUBCUTANEOUS at 12:03

## 2024-05-31 RX ADMIN — FENTANYL CITRATE 50 MCG: 50 INJECTION, SOLUTION INTRAMUSCULAR; INTRAVENOUS at 15:04

## 2024-05-31 RX ADMIN — PROPOFOL 200 MG: 10 INJECTION, EMULSION INTRAVENOUS at 10:52

## 2024-05-31 RX ADMIN — FUROSEMIDE 40 MG: 10 INJECTION INTRAMUSCULAR; INTRAVENOUS at 21:38

## 2024-05-31 RX ADMIN — FLUTICASONE FUROATE, UMECLIDINIUM BROMIDE AND VILANTEROL TRIFENATATE 1 PUFF: 100; 62.5; 25 POWDER RESPIRATORY (INHALATION) at 08:31

## 2024-05-31 RX ADMIN — FENTANYL CITRATE 50 MCG: 50 INJECTION, SOLUTION INTRAMUSCULAR; INTRAVENOUS at 15:17

## 2024-05-31 RX ADMIN — FENTANYL CITRATE 50 MCG: 50 INJECTION, SOLUTION INTRAMUSCULAR; INTRAVENOUS at 14:55

## 2024-05-31 ASSESSMENT — COGNITIVE AND FUNCTIONAL STATUS - GENERAL
CLIMB 3 TO 5 STEPS WITH RAILING: A LOT
WALKING IN HOSPITAL ROOM: A LOT
DRESSING REGULAR UPPER BODY CLOTHING: A LITTLE
DRESSING REGULAR LOWER BODY CLOTHING: A LOT
SUGGESTED CMS G CODE MODIFIER MOBILITY: CL
DAILY ACTIVITIY SCORE: 17
MOVING FROM LYING ON BACK TO SITTING ON SIDE OF FLAT BED: A LOT
TOILETING: A LOT
SUGGESTED CMS G CODE MODIFIER DAILY ACTIVITY: CK
MOVING TO AND FROM BED TO CHAIR: A LOT
STANDING UP FROM CHAIR USING ARMS: A LOT
HELP NEEDED FOR BATHING: A LOT
MOBILITY SCORE: 13
TURNING FROM BACK TO SIDE WHILE IN FLAT BAD: A LITTLE

## 2024-05-31 ASSESSMENT — PAIN DESCRIPTION - PAIN TYPE
TYPE: SURGICAL PAIN
TYPE: ACUTE PAIN
TYPE: SURGICAL PAIN
TYPE: SURGICAL PAIN
TYPE: ACUTE PAIN
TYPE: ACUTE PAIN
TYPE: SURGICAL PAIN
TYPE: ACUTE PAIN
TYPE: SURGICAL PAIN

## 2024-05-31 ASSESSMENT — FIBROSIS 4 INDEX: FIB4 SCORE: 1.45

## 2024-05-31 ASSESSMENT — ENCOUNTER SYMPTOMS
ORTHOPNEA: 1
PND: 1
DIZZINESS: 1
CHILLS: 1
PALPITATIONS: 1
WHEEZING: 1
NAUSEA: 1
FEVER: 1
COUGH: 1
SHORTNESS OF BREATH: 1

## 2024-05-31 NOTE — ED NOTES
Pt transported to T707-01 with RN via [facility].  Pt transported with all belongings, on monitor and 3L nc oxygen. Pt awake and oriented.

## 2024-05-31 NOTE — OR SURGEON
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 Benjamin P Jaquish, M.D.    Anesthesiologist/Type of Anesthesia:  Anesthesiologist: Gregorio Cramer M.D./General    Surgical Staff:  Circulator: Amy Strong R.N.  Relief Circulator: Amanda Stallings R.N.  Scrub Person: Joey Gil; Mahsa Antonio  Radiology Technologist: Thao Aguayo    Specimens removed if any:  * No specimens in log *    Estimated Blood Loss: 200cc    Complications: none        5/31/2024 2:56 PM Vishal Alcantara M.D.

## 2024-05-31 NOTE — PROGRESS NOTES
Hospital Medicine Daily Progress Note    Date of Service  5/31/2024    Chief Complaint  Rene Kelley is a 53 y.o. male admitted 5/30/2024 with shortness of breath, groin pain     Hospital Course  This is a 53 y.o. male who presented 5/30/2024 with past medical history of hypertension, polysubstance abuse, morbid obesity who presents to the hospital for chest pain and shortness of breath since he was discharged from the hospital on 5/26.   Patient is also noticed redness and drainage from his groin site and his groin right groin. Patient underwent a emergent endovascular repair of type B aortic dissection with placement of a aortic stent graft. Angioplasty and stent placement of the right external iliac. Angioplasty and stenting of the left common iliac. Left to right femoral-femoral artery bypass using graft. The patient does have extensive smoking and meth use. He still continues to smoke cigarettes.   Vascular surgery consulted     Interval Problem Update  Patient seen and examined, going for surgery today with vascular surgery for his infected graft appreciate rec.  I have consulted also infection disease appreciate rec.  Cont on IV abx   Close monitoring on tele     I have discussed this patient's plan of care and discharge plan at IDT rounds today with Case Management, Nursing, Nursing leadership, and other members of the IDT team.    Consultants/Specialty  infectious disease and vascular surgery    Code Status  Full Code    Disposition  The patient is not medically cleared for discharge to home or a post-acute facility.      I have placed the appropriate orders for post-discharge needs.    Review of Systems  Review of Systems   Constitutional:  Positive for chills, fever and malaise/fatigue.   Respiratory:  Positive for cough, shortness of breath and wheezing.    Cardiovascular:  Positive for palpitations, orthopnea, leg swelling and PND.   Gastrointestinal:  Positive for nausea.   Neurological:   Positive for dizziness.        Physical Exam  Temp:  [36.6 °C (97.8 °F)-37.2 °C (98.9 °F)] 36.6 °C (97.8 °F)  Pulse:  [64-81] 65  Resp:  [15-24] 18  BP: (102-172)/(57-82) 150/82  SpO2:  [90 %-99 %] 95 %    Physical Exam  Vitals and nursing note reviewed.   Constitutional:       General: He is not in acute distress.     Appearance: Normal appearance. He is not ill-appearing, toxic-appearing or diaphoretic.   HENT:      Head: Normocephalic and atraumatic.      Nose: No congestion or rhinorrhea.      Mouth/Throat:      Pharynx: No posterior oropharyngeal erythema.   Eyes:      General: No scleral icterus.        Right eye: No discharge.   Neck:      Vascular: Hepatojugular reflux and JVD present.   Cardiovascular:      Rate and Rhythm: Normal rate and regular rhythm.      Pulses: Normal pulses.      Heart sounds: No murmur heard.     No friction rub. No gallop.   Pulmonary:      Effort: Respiratory distress present.      Breath sounds: No stridor. Rales present. No wheezing or rhonchi.   Abdominal:      General: There is distension.      Tenderness: There is no abdominal tenderness.   Musculoskeletal:         General: No swelling, tenderness, deformity or signs of injury.      Cervical back: Normal range of motion.      Right lower leg: Edema present.      Left lower leg: Edema present.   Skin:     Capillary Refill: Capillary refill takes more than 3 seconds.      Coloration: Skin is not jaundiced or pale.      Findings: Erythema and rash present. No bruising or lesion.      Comments: Scrotal swelling   Neurological:      General: No focal deficit present.      Mental Status: He is alert and oriented to person, place, and time.         Fluids    Intake/Output Summary (Last 24 hours) at 5/31/2024 1224  Last data filed at 5/31/2024 0740  Gross per 24 hour   Intake 0 ml   Output 2300 ml   Net -2300 ml       Laboratory  Recent Labs     05/30/24  1305 05/31/24  0027   WBC 11.4* 10.2   RBC 4.21* 4.07*   HEMOGLOBIN 13.6*  13.3*   HEMATOCRIT 40.3* 39.1*   MCV 95.7 96.1   MCH 32.3 32.7   MCHC 33.7 34.0   RDW 48.9 49.7   PLATELETCT 231 204   MPV 9.9 9.7     Recent Labs     05/30/24  1305 05/31/24  0027   SODIUM 132* 132*   POTASSIUM 4.0 3.6   CHLORIDE 99 100   CO2 20 22   GLUCOSE 81 104*   BUN 21 23*   CREATININE 1.01 1.09   CALCIUM 8.2* 7.7*     Recent Labs     05/30/24  1305   APTT 30.3   INR 1.25*         Recent Labs     05/31/24  0027   TRIGLYCERIDE 80   HDL 21*   LDL 53       Imaging  DX-CHEST-PORTABLE (1 VIEW)   Final Result      1.  Trace LEFT pleural effusion   2.  Decreased LEFT peripheral pulmonary opacity which could be atelectasis or pneumonia      CT-CTA COMPLETE THORACOABDOMINAL AORTA   Final Result      1.  Type B aortic dissection status post thoracic stent graft. Dissection flap is unchanged extending from just beyond the left subclavian artery origin into the bilateral external iliac arteries.   2.  Proximal descending thoracic aortic aneurysm measuring 4.5 x 4.5 cm.   3.  Status post bilateral common iliac artery stent placement and femorofemoral bypass. Stents and distal femoral arteries appear patent.   4.  Mild stranding and multiple foci of gas in the lower anterior abdominal wall without focal drainable fluid collection just chest abscess.   5.  Redemonstrated extension of the abdominal aortic dissection into the celiac artery which supplied by both the true and false lumen. Distal vessels appear patent.   6.  Diffuse bilateral tree-in-bud opacities which could be seen in setting edema and/or infection.   7.  15 mm lingular pulmonary nodule.   8.  Trace left pleural effusion.   9.  Nonobstructive bilateral renal calculi.      [place] Type B aortic dissection      Fleischner Society pulmonary nodule recommendations:   Low and High Risk: Consider CT at 3 months, PET/CT, or tissue sampling.      Low Risk - Minimal or absent history of smoking and of other known risk factors.      High Risk - History of smoking or  of other known risk factors.      Note: These recommendations do not apply to lung cancer screening, patients with immunosuppression, or patients with known primary cancer.      Fleischner Society 2017 Guidelines for Management of Incidentally Detected Pulmonary Nodules in Adults               EC-ECHOCARDIOGRAM COMPLETE W/O CONT    (Results Pending)   IR-ABDOMINAL AORTA-WITH RUNOFF    (Results Pending)        Assessment/Plan  * Wound infection- (present on admission)  Assessment & Plan  Pustular drainage from his groin wound  Staples removed in the ER  Pain control  Now on vanco and zosyn   Follow-up on wound cultures and blood cultures  Vascular surgery following and plan to get patient to OR  I have also consulted Infection disease     Acute diastolic heart failure (HCC)  Assessment & Plan  Presents decompensated  IV Lasix 40 twice daily  Strict ins and outs and daily weights  Monitor on telemetry      Acute respiratory failure with hypoxia (HCC)  Assessment & Plan  On 3 L of O2 above baseline    Tobacco abuse- (present on admission)  Assessment & Plan  Tobacco cessation education provided for more than 11 minutes.  Explained to the patient that he has COPD and peripheral vascular disease.  He recently had surgery that would be worse with smoking cigarettes.  We discussed the risks of smoking including increased risk of heart disease, stroke, cancer and COPD. We discussed the benefits of quitting smoking. We discussed options of nicotine patch, wellbutrin and chantix.  The patient has the intention to quit smoking. Nicotine replacement protocol will be provided to the patient.      Acquired hypothyroidism- (present on admission)  Assessment & Plan  Continue levothyroxine    Dissection of aorta (HCC)- (present on admission)  Assessment & Plan  status post emergent endovascular repair of descending thoracic aortic type B dissection with placement of thoracic stent graft  Patient also underwent iliac stenting and  femoral to femoral artery bypass  Pain control  Monitor blood pressure  He has multiple stents and graft placement.  I will ask vascular surgery if he needs to be on aspirin or Plavix    Hypertension- (present on admission)  Assessment & Plan  Uncontrolled  Continue home blood pressure medications         VTE prophylaxis: scd     Greater than 51 minutes spent prepping to see patient (e.g. review of tests) obtaining and/or reviewing separately obtained history. Performing a medically appropriate examination and/ evaluation.  Counseling and educating the patient/family/caregiver.  Ordering medications, tests, or procedures.  Referring and communicating with other health care professionals.  Documenting clinical information in EPIC.  Independently interpreting results and communicating results to patient/family/caregiver.  Care coordination.     I have performed a physical exam and reviewed and updated ROS and Plan today (5/31/2024). In review of yesterday's note (5/30/2024), there are no changes except as documented above.

## 2024-05-31 NOTE — ANESTHESIA POSTPROCEDURE EVALUATION
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 Responsible Provider: Gregorio Cramer M.D.    Anesthesia Type: general ASA Status: 3            Final Anesthesia Type: general  Last vitals  BP   Blood Pressure: 119/69    Temp   35.9 °C (96.7 °F)    Pulse   61   Resp   20    SpO2   90 %      Anesthesia Post Evaluation    Patient location during evaluation: PACU  Patient participation: complete - patient participated  Level of consciousness: awake and alert    Airway patency: patent  Anesthetic complications: no  Cardiovascular status: hemodynamically stable  Respiratory status: acceptable  Hydration status: euvolemic    PONV: none          No notable events documented.     Nurse Pain Score: 3 (NPRS)

## 2024-05-31 NOTE — ASSESSMENT & PLAN NOTE
status post emergent endovascular repair of descending thoracic aortic type B dissection with placement of thoracic stent graft  Patient also underwent iliac stenting and femoral to femoral artery bypass  Pain control  Monitor blood pressure  He has multiple stents and graft placement.  I will ask vascular surgery if he needs to be on aspirin or Plavix

## 2024-05-31 NOTE — OP REPORT
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  -Recent acute type B aortic dissection extending from the left subclavian artery down into both iliac arteries    Procedure:  -Removal of infected prosthetic femoral to femoral arterial bypass graft with primary closure of the left common femoral artery arteriotomy and saphenous vein patch angioplasty of the right common femoral artery arteriotomy (07494, 66308, 01195)  -Direct sheath insertion in the bilateral common femoral arteries (06622-47)  - Nonselective catheterization of the infrarenal aorta (54936)  -Intravascular ultrasound assessment of the bilateral common iliac arteries (48670-48)  -Placement of kissing bilateral common iliac artery stents, 12 mm x 80 mm self-expanding uncovered Abre stent on the right and 14 mm x 150 mm self-expanding uncovered Abre stent on the left (11685-00)  -Creation of right sartorius muscle flap (53658 )  - Application of bilateral groin wound vacs, each less than 50 cm² (78081-60)    _____________________________________________________    Surgeon:                                 Vishal Alcantara MD    Assistant:   Gagandeep Arnett MD    Anesthesia:                             General anesthesia     EBL:                                        200 cc    Heparin:                                  Systemically heparinized and reversed near the end of the case    Specimen:   None sent    Complications:                         none    Disposition:                             Tolerated well, sent to recovery in stable condition    Justification for use of Surgical First Assist:  An experienced first assistant was utilized during this operation due to the complexity of the operation.  My assistant participated with patient preparation for surgery, incision, surgical exposure including retraction, dissection, and ligation to isolate the target structures and preserve nearby structures, and closure of the field of dissection.  The presence of the expert assist increased the efficiency of the operation and decreased the risk of intraoperative surgical complications.    _____________________________________________________      History:  53 y.o. male     This is a 53 y.o. male who presented with an acute type B aortic dissection on 5/21/2024 and he underwent surgery to repair the dissection.  Specifically the patient had a thoracic aortic stent graft placed in the proximal descending thoracic aorta to cover the entry tear.  The patient also had bilateral lower extremity ischemia and the left lower extremity was reperfused with iliac stenting, however the right iliac artery could not be recanalized with stent placement and so a left to right femoral to femoral dacron bypass was placed.  The patient presented to the ER on the evening of 5/30/24 with episodic back pain but also was found to have erythema and drainage from his groin incisions consistent with infection of the femoral to femoral bypass graft.  Removal of the prosthetic bypass graft is required in order to eradicate the infection.  Since this is a left to right femoral to femoral bypass graft, the right leg will most likely become ischemic after removal of the femorofemoral bypass, therefore a repeat attempt at stent placement to recanalize the right iliac artery is recommended during the same operation.  Patient will also likely need muscle flap coverage of his femoral arteries and  then wound VAC placement.    I conducted a thorough preoperative discussion regarding our findings and recommendations.  I explained the operation, alternatives, and potential risks, including but not limited to bleeding, infection, injury to vessels or nerves, possible multiple incisions, use of xray and contrast exposure, risks of anesthesia, and global risks such as stroke, heart attack, pulmonary complications, and even potentially not surviving the operation or the recovery.  All questions were answered. They understand and agree to proceed.    Procedure Summary:  Following informed consent, the patient was placed supine on the operating table, and general anesthesia was administered.  The patient was prepped and draped sterile in the usual fashion. Surgical time-out was called, and everyone was in agreement.  The patient did receive preoperative prophylactic antibiotics.      The existing inguinal incisions were reopened by removing the staples and cutting the underlying sutures.  The graft conduit was relatively easy to expose in both incisions however the common femoral arteries were found to be incorporated with surrounding tissue and dissection was required.  Both common femoral arteries were dissected circumferentially proximally near the inguinal ligament and encircled with Vesseloops.  Vesseloops were then placed around the origins of the SFA and profunda bilaterally.  At this point proximal and distal control of both common femoral arteries was obtained.    Attention was then turned to the endovascular portion of the procedure.  The patient was systemically heparinized and then 6 [place] sheaths were placed in both common femoral arteries and advanced up into the respective iliac systems.  J-wire's were advanced up both sides and both wires passed easily into the true lumen of the aorta.  The course of both wires was evaluated with intravascular ultrasound extending from the thoracic aorta all the way  down into the external iliac artery bilaterally.  IVUS assessment confirmed that both the right and left wires traversed up the iliac arteries and into the aorta at staying true lumen the entire time.  The intravascular ultrasound did demonstrate a dynamic dissection flap which was causing compromise of the true lumen near the aortic bifurcation concerning for potential ongoing lower extremity ischemia if the dissection flap was not addressed.  Diameter measurements were taken of the infrarenal aorta which measured about 22 mm, the right external iliac artery which measured about 10 mm with an existing stent inside of it, the left common iliac artery measured 10 mm through the existing stent, and the left external iliac artery which measured about 11 mm.  The dissection flap extended through the right common iliac artery and the dissection flap on the left extended all the way into the external iliac artery and was causing compromise of the true lumen.  Decision was made to place kissing stents to address the infrarenal aortic dissection flap as well as fully recanalize the true lumen of both iliac artery systems.  A 12 mm x 80 mm self-expanding uncovered Abre stent was advanced up the right side to about 3 cm above the aortic bifurcation, and a 14 mm x 150 mm self-expanding uncovered Abre stent was advanced up the left side to about 3 cm above the aortic bifurcation level with the right sided stent.  Both stents were deployed and the right-sided stent traversed down through the right common iliac artery and joined the right external iliac artery stent with a couple centimeters of overlap.  The left-sided stent traversed through the left common iliac artery and down into the mid external iliac artery.  Completion angiogram showed excellent flow from the aorta down both iliac arteries into the common femoral arteries with no evidence of stenosis.  The femoral sheaths were removed and the sites were closed with 4-0  Prolene suture.    At this point the right common femoral artery was clamped and the prosthetic conduit was removed from the anterior surface of the common femoral artery.  I attempted a primary closure of the arteriotomy however the arteriotomy extended onto the proximal SFA and primary closure at this level caused significant stenosis of the SFA origin.  I therefore harvested a piece of right greater saphenous vein and created a vein patch which was used to repair the right common femoral artery.  The vein patch was attached to the right common femoral artery with a running 5-0 Prolene suture line.  The repair was flushed and irrigated prior to completion and once complete it was pressurized and found to be hemostatic and then outflow was restored and there was a pulse throughout the right common femoral artery and superficial femoral artery at this point in time.  Topical hemostatic was applied.    The left common femoral artery was clamped and the prosthetic bypass graft was removed from the anterior surface of the common femoral artery.  The arteriotomy was closed primarily with interrupted 5-0 Prolene sutures.  The repair was flushed and irrigated prior to completion and once complete it was pressurized and found to be hemostatic and then outflow was restored.  There was a palpable pulse throughout the left common femoral artery and proximal superficial femoral artery at this point in time.    Both groin wounds were irrigated with hydroperoxide and saline as well as the tract between the 2 incisions where the prosthetic material has been removed.  I then created a right sartorius muscle flap to protect the right common femoral artery reconstruction given the presence of the vein patch.  A left sartorius muscle flap was not needed as the left common femoral artery was closed primarily and there was adequate surrounding tissue to achieve coverage over the artery.  At this point the wounds were irrigated again  and then black foam negative pressure dressings were applied to both wounds and connected to suction and found to have a good seal.    The patient was then sent to recovery in stable condition.  All counts were correct at the conclusion of the case.    Postoperative Plan:    The plan is for the groin wounds to be healed entirely by secondary intention with wound VAC therapy.  Patient may require wound VAC therapy for 1-2 months give or take to heal these wounds.  Patient will stay on empiric antibiotics until the culture results come back and then we can tailor antibiotic therapy from there.  Patient is allowed to mobilize as tolerated.      Vishal Alcantara MD  [facility]

## 2024-05-31 NOTE — CARE PLAN
The patient is Stable - Low risk of patient condition declining or worsening    Shift Goals  Clinical Goals: VSS, safety  Patient Goals: to get better  Family Goals: STEVE    Progress made toward(s) clinical / shift goals:    Problem: Care Map:  Day 1 Optimal Outcome for the Heart Failure Patient  Goal: Day 1:  Optimal Care of the heart failure patient  Description: Target End Date:  end of day 1  Outcome: Progressing     Problem: Fall Risk  Goal: Patient will remain free from falls  Description: Target End Date:  Prior to discharge or change in level of care    Document interventions on the [facility] Fall Risk Assessment    1.  Assess for fall risk factors  2.  Implement fall precautions  Outcome: Progressing     Problem: Knowledge Deficit - Standard  Goal: Patient and family/care givers will demonstrate understanding of plan of care, disease process/condition, diagnostic tests and medications  Description: Target End Date:  1-3 days or as soon as patient condition allows    Document in Patient Education    1.  Patient and family/caregiver oriented to unit, equipment, visitation policy and means for communicating concern  2.  Complete/review Learning Assessment  3.  Assess knowledge level of disease process/condition, treatment plan, diagnostic tests and medications  4.  Explain disease process/condition, treatment plan, diagnostic tests and medications  Outcome: Progressing       Patient is not progressing towards the following goals:

## 2024-05-31 NOTE — CONSULTS
Patient with recent vascular surgery and now infection in the femoral site with purulent fluid expressed per notes.  This has been sent for culture along with blood cultures.  He is gone back to the OR.    --- Continue vancomycin and Zosyn for now    ID will see the patient in the a.m.    Domi Moss MD

## 2024-05-31 NOTE — H&P
Hospital Medicine History & Physical Note    Date of Service  5/30/2024    Primary Care Physician  Pcp Pt States None    Consultants  vascular surgery    Specialist Names: Dr. Alcantara     Code Status  Full Code    Chief Complaint  Chief Complaint   Patient presents with    Chest Pain     Initial complaint.  Currently 0/10.     Shortness of Breath    Dizziness    Groin Pain     With testicular edema.    Wound Infection     Possible, at surgical site supra pubic region.         History of Presenting Illness  Rene Kelley is a 53 y.o. male who presented 5/30/2024 with past medical history of hypertension, polysubstance abuse, morbid obesity who presents to the hospital for chest pain and shortness of breath since he was discharged from the hospital on 5/26.  Patient described chest pain that goes through his back.  Sometimes his back pain going through the chest.  The chest pain and shortness of breath is worse on exertion.  It is associated with orthopnea, productive cough, nausea, low-grade fevers, lower extremity swelling and scrotal swelling.  Patient is also noticed redness and drainage from his groin site and his groin right groin.  Patient underwent a emergent endovascular repair of type B aortic dissection with placement of a aortic stent graft.  Angioplasty and stent placement of the right external iliac.  Angioplasty and stenting of the left common iliac.  Left to right femoral-femoral artery bypass using graft.  The patient does have extensive smoking and meth use.  He still continues to smoke cigarettes.    Chest x-ray interpreted by me pulm bilateral pulm edema and a possible left lower lobe infiltrate      I discussed the plan of care with patient.    Review of Systems  Review of Systems   Constitutional:  Positive for chills, fever and malaise/fatigue.   Respiratory:  Positive for cough, shortness of breath and wheezing.    Cardiovascular:  Positive for palpitations, orthopnea, leg swelling and  PND.   Gastrointestinal:  Positive for nausea.   Neurological:  Positive for dizziness.       Past Medical History  Aortic dissection, prophylactic disease    Surgical History   has a past surgical history that includes abdominal aortic aneurysm (Bilateral, 5/21/2024); echocardiogram, transesophageal, intraoperative (N/A, 5/21/2024); and femoral femoral bypass (Bilateral, 5/21/2024).     Family History  Family history reviewed with patient. There is no family history that is pertinent to the chief complaint.     Social History   reports that he has been smoking cigarettes. He started smoking 10 days ago. He has a 0.1 pack-year smoking history. He has quit using smokeless tobacco. He reports current alcohol use. He reports that he does not use drugs.    Allergies  No Known Allergies    Medications  Prior to Admission Medications   Prescriptions Last Dose Informant Patient Reported? Taking?   amLODIPine (NORVASC) 10 MG Tab 5/30/2024 at AM Patient, Rx Bottle (For Med Information) No Yes   Sig: Take 1 Tablet by mouth every day.   carvedilol (COREG) 12.5 MG Tab 5/30/2024 at AM Patient, Rx Bottle (For Med Information) No Yes   Sig: Take 1 Tablet by mouth 2 times a day with meals.   famotidine (PEPCID) 20 MG Tab NOT STARTED at NOT STARTED Patient No No   Sig: Take 1 Tablet by mouth every day.   ferrous sulfate 325 (65 Fe) MG tablet NOT STARTED at NOT STARTED Patient No No   Sig: Take 1 Tablet by mouth every morning with breakfast.   fluticasone-umeclidinium-vilanterol (TRELEGY ELLIPTA) 100-62.5-25 mcg/act inhaler 5/30/2024 at AM Patient, Rx Bottle (For Med Information) No Yes   Sig: Inhale 1 Puff by mouth every day.   gabapentin (NEURONTIN) 100 MG Cap 5/30/2024 at AM Patient, Rx Bottle (For Med Information) No Yes   Sig: Take 1 Capsule by mouth 3 times a day.   levothyroxine (SYNTHROID) 100 MCG Tab 5/30/2024 at AM Patient, Rx Bottle (For Med Information) No Yes   Sig: Take 1 Tablet by mouth every morning on an empty  stomach.   methocarbamol (ROBAXIN) 500 MG Tab 5/30/2024 at AM Patient, Rx Bottle (For Med Information) No Yes   Sig: Take 1 Tablet by mouth 4 times a day.   nicotine (NICODERM) 21 MG/24HR PATCH 24 HR NOT STARTED at NOT STARTED Patient No No   Sig: Place 1 Patch on the skin every 24 hours.   oxyCODONE immediate-release (ROXICODONE) 5 MG Tab 5/30/2024 at 0800 Patient, Rx Bottle (For Med Information) No Yes   Sig: Take 1 Tablet by mouth every 6 hours as needed for Severe Pain for up to 5 days.   polyethylene glycol/lytes (MIRALAX) Pack NOT STARTED at NOT STARTED Patient No No   Sig: Take 1 Packet by mouth every day.   senna-docusate (PERICOLACE OR SENOKOT S) 8.6-50 MG Tab NOT STARTED at NOT STARTED Patient No No   Sig: Take 2 Tablets by mouth 2 times a day.      Facility-Administered Medications: None       Physical Exam  Temp:  [37.2 °C (98.9 °F)] 37.2 °C (98.9 °F)  Pulse:  [69-74] 69  Resp:  [23-24] 23  BP: (123)/(65) 123/65  SpO2:  [97 %] 97 %  Blood Pressure: 123/65   Temperature: 37.2 °C (98.9 °F)   Pulse: 69   Respiration: (!) 23   Pulse Oximetry: 97 %       Physical Exam  Vitals and nursing note reviewed.   Constitutional:       General: He is not in acute distress.     Appearance: Normal appearance. He is not ill-appearing, toxic-appearing or diaphoretic.   HENT:      Head: Normocephalic and atraumatic.      Nose: No congestion or rhinorrhea.      Mouth/Throat:      Pharynx: No posterior oropharyngeal erythema.   Eyes:      General: No scleral icterus.        Right eye: No discharge.   Neck:      Vascular: Hepatojugular reflux and JVD present.   Cardiovascular:      Rate and Rhythm: Normal rate and regular rhythm.      Pulses: Normal pulses.      Heart sounds: No murmur heard.     No friction rub. No gallop.   Pulmonary:      Effort: Respiratory distress present.      Breath sounds: No stridor. Rales present. No wheezing or rhonchi.   Abdominal:      General: There is distension.      Tenderness: There is no  "abdominal tenderness.   Musculoskeletal:         General: No swelling, tenderness, deformity or signs of injury.      Cervical back: Normal range of motion.      Right lower leg: Edema present.      Left lower leg: Edema present.   Skin:     Capillary Refill: Capillary refill takes more than 3 seconds.      Coloration: Skin is not jaundiced or pale.      Findings: Erythema and rash present. No bruising or lesion.      Comments: Scrotal swelling   Neurological:      General: No focal deficit present.      Mental Status: He is alert and oriented to person, place, and time.         Laboratory:  Recent Labs     05/30/24  1305   WBC 11.4*   RBC 4.21*   HEMOGLOBIN 13.6*   HEMATOCRIT 40.3*   MCV 95.7   MCH 32.3   MCHC 33.7   RDW 48.9   PLATELETCT 231   MPV 9.9     Recent Labs     05/30/24  1305   SODIUM 132*   POTASSIUM 4.0   CHLORIDE 99   CO2 20   GLUCOSE 81   BUN 21   CREATININE 1.01   CALCIUM 8.2*     Recent Labs     05/30/24  1305   ALTSGPT 28   ASTSGOT 33   ALKPHOSPHAT 93   TBILIRUBIN 1.6*   GLUCOSE 81     Recent Labs     05/30/24  1305   APTT 30.3   INR 1.25*     No results for input(s): \"NTPROBNP\" in the last 72 hours.      Recent Labs     05/30/24  1305   TROPONINT 50*       Imaging:  DX-CHEST-PORTABLE (1 VIEW)   Final Result      1.  Trace LEFT pleural effusion   2.  Decreased LEFT peripheral pulmonary opacity which could be atelectasis or pneumonia      CT-CTA COMPLETE THORACOABDOMINAL AORTA   Final Result      1.  Type B aortic dissection status post thoracic stent graft. Dissection flap is unchanged extending from just beyond the left subclavian artery origin into the bilateral external iliac arteries.   2.  Proximal descending thoracic aortic aneurysm measuring 4.5 x 4.5 cm.   3.  Status post bilateral common iliac artery stent placement and femorofemoral bypass. Stents and distal femoral arteries appear patent.   4.  Mild stranding and multiple foci of gas in the lower anterior abdominal wall without focal " drainable fluid collection just chest abscess.   5.  Redemonstrated extension of the abdominal aortic dissection into the celiac artery which supplied by both the true and false lumen. Distal vessels appear patent.   6.  Diffuse bilateral tree-in-bud opacities which could be seen in setting edema and/or infection.   7.  15 mm lingular pulmonary nodule.   8.  Trace left pleural effusion.   9.  Nonobstructive bilateral renal calculi.      [place] Type B aortic dissection      Fleischner Society pulmonary nodule recommendations:   Low and High Risk: Consider CT at 3 months, PET/CT, or tissue sampling.      Low Risk - Minimal or absent history of smoking and of other known risk factors.      High Risk - History of smoking or of other known risk factors.      Note: These recommendations do not apply to lung cancer screening, patients with immunosuppression, or patients with known primary cancer.      Fleischner Society 2017 Guidelines for Management of Incidentally Detected Pulmonary Nodules in Adults               EC-ECHOCARDIOGRAM COMPLETE W/O CONT    (Results Pending)       X-Ray:  I have personally reviewed the images and compared with prior images.  EKG:  I have personally reviewed the images and compared with prior images.    Assessment/Plan:  Justification for Admission Status  I anticipate this patient will require at least two midnights for appropriate medical management, necessitating inpatient admission because wound infection    Patient will need a Telemetry bed on MEDICAL service .  The need is secondary to wound infection.    * Wound infection- (present on admission)  Assessment & Plan  Pustular drainage from his groin wound  Staples removed in the ER  Pain control  Start IV vancomycin, follow trough levels and adjust dose accordingly  Start IV Zosyn  Follow-up on wound cultures and blood cultures  Wound consult  Vascular surgery has been consulted    Acute diastolic heart failure (HCC)  Assessment &  Plan  Presents decompensated  IV Lasix 40 twice daily  Strict ins and outs and daily weights  Monitor on telemetry      Acute respiratory failure with hypoxia (HCC)  Assessment & Plan  On 3 L of O2 above baseline    Acquired hypothyroidism- (present on admission)  Assessment & Plan  Continue levothyroxine    Dissection of aorta (HCC)- (present on admission)  Assessment & Plan  status post emergent endovascular repair of descending thoracic aortic type B dissection with placement of thoracic stent graft  Patient also underwent iliac stenting and femoral to femoral artery bypass  Pain control  Monitor blood pressure  He has multiple stents and graft placement.  I will ask vascular surgery if he needs to be on aspirin or Plavix    Hypertension- (present on admission)  Assessment & Plan  Uncontrolled  Continue home blood pressure medications    Tobacco abuse- (present on admission)  Assessment & Plan  Tobacco cessation education provided for more than 11 minutes.  Explained to the patient that he has COPD and peripheral vascular disease.  He recently had surgery that would be worse with smoking cigarettes.  We discussed the risks of smoking including increased risk of heart disease, stroke, cancer and COPD. We discussed the benefits of quitting smoking. We discussed options of nicotine patch, wellbutrin and chantix.  The patient has the intention to quit smoking. Nicotine replacement protocol will be provided to the patient.          VTE prophylaxis: SCDs/TEDs

## 2024-05-31 NOTE — PROGRESS NOTES
Received report from Ed nurse. Pt arrived to unit at 2044 via gurney escorted by primary nurse. Assessment complete. VSS. No signs of distress noted at this time. Tele monitor in place. Monitor room notified. Pt c/o pain 0/10. Fall precautions and appropriate signs in place. Pt oriented to unit routine, call light/phone system within reach. Personal belongings within reach. Pt educated regarding fall precautions. Bed alarm is on. Safety and fall precautions in place. Pt denies any further needs at this time.

## 2024-05-31 NOTE — THERAPY
Physical Therapy Contact Note    Patient Name: Rene Kelley  Age:  53 y.o., Sex:  male  Medical Record #: [id number]  Today's Date: 5/31/2024    PT orders received. Pt pending revision of L to R femoral bypass graft per chart. Will hold and attempt PT eval post-op as appropriate     Haris Ellison PT, DPT

## 2024-05-31 NOTE — ASSESSMENT & PLAN NOTE
Presents decompensated  IV Lasix 40 twice daily  Strict ins and outs and daily weights  Monitor on telemetry

## 2024-05-31 NOTE — PROGRESS NOTES
4 Eyes Skin Assessment post procedure Completed by Ada RN and Benny RN.    Head WDL  Ears Redness and Blanching  Nose WDL  Mouth WDL  Neck WDL  Breast/Chest WDL  Shoulder Blades WDL  Spine WDL  (R) Arm/Elbow/Hand Redness and Blanching  (L) Arm/Elbow/Hand Redness and Blanching  Abdomen Redness, Blanching, and Bruising  Groin Redness and Incision, wound vac to bilateral groin  Scrotum/Coccyx/Buttocks Redness and Blanching  (R) Leg Edema  (L) Leg Edema  (R) Heel/Foot/Toe Redness and Blanching  (L) Heel/Foot/Toe Redness and Blanching          Devices In Places Tele Box, Blood Pressure Cuff, Pulse Ox, and Nasal Cannula, wound vac      Interventions In Place Gray Ear Foams    Possible Skin Injury Yes    Pictures Uploaded Into Epic Yes  Wound Consult Placed Yes  RN Wound Prevention Protocol Ordered Yes

## 2024-05-31 NOTE — ASSESSMENT & PLAN NOTE
Pustular drainage from his groin wound  Staples removed in the ER  Pain control  Now on vanco and zosyn   Follow-up on wound cultures and blood cultures  Vascular surgery following and plan to get patient to OR  I have also consulted Infection disease

## 2024-05-31 NOTE — ANESTHESIA PROCEDURE NOTES
Airway    Date/Time: 5/31/2024 10:53 AM    Performed by: Gregorio Cramer M.D.  Authorized by: Gregorio Cramer M.D.    Location:  OR  Urgency:  Elective  Indications for Airway Management:  Anesthesia      Spontaneous Ventilation: absent    Sedation Level:  Deep  Preoxygenated: Yes    Patient Position:  Sniffing  Final Airway Type:  Endotracheal airway  Final Endotracheal Airway:  ETT  Cuffed: Yes    Technique Used for Successful ETT Placement:  Direct laryngoscopy    Insertion Site:  Oral  Blade Type:  Soy  Laryngoscope Blade/Videolaryngoscope Blade Size:  3  ETT Size (mm):  8.0  Measured from:  Teeth  ETT to Teeth (cm):  22  Placement Verified by: auscultation and capnometry    Cormack-Lehane Classification:  Grade I - full view of glottis  Number of Attempts at Approach:  1

## 2024-05-31 NOTE — DIETARY
NUTRITION SERVICES: BMI - Pt with BMI >40 (=Body mass index is 48.71 kg/m².), Class III obesity. Weight loss counseling not appropriate in acute care setting. RECOMMEND - If appropriate at DC please refer to outpatient nutrition services for weight management.

## 2024-05-31 NOTE — PROGRESS NOTES
4 Eyes Skin Assessment Completed by BEATA Gregorio and BEATA Lagunas.    Head WDL  Ears WDL  Nose WDL  Mouth WDL  Neck WDL  Breast/Chest WDL  Shoulder Blades WDL  Spine WDL  (R) Arm/Elbow/Hand WDL  (L) Arm/Elbow/Hand WDL  Abdomen Redness  Groin Redness, Swelling, and Incision  Scrotum/Coccyx/Buttocks WDL  (R) Leg Swelling and Edema  (L) Leg Swelling and Edema  (R) Heel/Foot/Toe Edema  (L) Heel/Foot/Toe Edema          Devices In Places Tele Box, Blood Pressure Cuff, and Pulse Ox      Interventions In Place Gray Ear Foams and Pillows    Possible Skin Injury Yes    Pictures Uploaded Into Epic Yes  Wound Consult Placed Yes  RN Wound Prevention Protocol Ordered Yes

## 2024-05-31 NOTE — THERAPY
05/31/24 0749   Interdisciplinary Plan of Care Collaboration   Collaboration Comments OT consult received. Pt pending revision of L to R femoral bypass graft this date per chart. Will hold and follow up post op as appropriate to complete eval.

## 2024-05-31 NOTE — ANESTHESIA PREPROCEDURE EVALUATION
Case: [id number] Date/Time: 05/31/24 1015    Procedures:       CREATION, BYPASS, ARTERIAL, FEMORAL TO FEMORAL, USING GRAFT (Groin)      ANGIOGRAM POSSIBLE STENT (Groin)    Anesthesia type: General    Pre-op diagnosis: Infection of left to right femoral to femoral bypass graft    Location: [facility] OR [facility] / SURGERY [facility]    Surgeons: Vishal Alcantara M.D.            Relevant Problems   CARDIAC   (positive) Aortic dissection (HCC)   (positive) Dissection of aorta (HCC)   (positive) Hypertension      ENDO   (positive) Acquired hypothyroidism       Physical Exam    Airway   Mallampati: II  TM distance: >3 FB  Neck ROM: full       Cardiovascular - normal exam  Rhythm: regular  Rate: normal  (-) murmur     Dental - normal exam           Pulmonary - normal exam  Breath sounds clear to auscultation     Abdominal    Neurological - normal exam                   Anesthesia Plan    ASA 3   ASA physical status 3 criteria: alcohol and/or substance dependence or abuse, moderate reduction of ejection fraction and morbid obesity - BMI greater than or equal to 40    Plan - general       Airway plan will be ETT          Induction: intravenous    Postoperative Plan: Postoperative administration of opioids is intended.    Pertinent diagnostic labs and testing reviewed    Informed Consent:    Anesthetic plan and risks discussed with patient.    Use of blood products discussed with: patient whom consented to blood products.

## 2024-05-31 NOTE — OR NURSING
1447- Pt arrives to PACU from OR on 6L of oxygen via mask. Report received. Wound vac in place.    1504- Pt medicated for pain per MAR, pedal pulses heard by doppler bilaterally.      1522- Pt mom Eleonora called and updated on pt status and POC.     1545- Report called to Ada COTA

## 2024-05-31 NOTE — ANESTHESIA PROCEDURE NOTES
Arterial Line    Performed by: Gregorio Cramer M.D.  Authorized by: Gregorio Cramer M.D.    Start Time:  5/31/2024 10:56 AM  End Time:  5/31/2024 10:56 AM  Localization: surface landmarks    Patient Location:  OR  Indication: continuous blood pressure monitoring        Catheter Size:  20 G  Seldinger Technique?: Yes    Site:  Radial artery  Line Secured:  Antimicrobial disc, tape and transparent dressing  Events: patient tolerated procedure well with no complications

## 2024-05-31 NOTE — PROGRESS NOTES
Pharmacy Vancomycin Kinetics Note for 5/30/2024     53 y.o. male on Vancomycin day # 1     Vancomycin Indication (AUC Dosing): Skin/skin structure infection    Provider specified end date: 06/06/24    Active Antibiotics (From admission, onward)      Ordered     Ordering Provider       Thu May 30, 2024  7:47 PM    05/30/24 1947  vancomycin (Vancocin) 1,750 mg in  mL IVPB  (vancomycin (VANCOCIN) IV (LD + Maintenance))  EVERY 12 HOURS        Note to Pharmacy: Per P&T vanco per pharmacy Protocol    Jonas Wall M.D.       Thu May 30, 2024  6:15 PM    05/30/24 1815  MD Alert...Vancomycin per Pharmacy  PHARMACY TO DOSE        Question:  Indication(s) for vancomycin?  Answer:  Skin and soft tissue infection    Jonas Wall M.D.    05/30/24 1815  piperacillin-tazobactam (Zosyn) 3.375 g in  mL IVPB  (piperacillin-tazobactam (ZOSYN) IV (Extended-infusion) PANEL )  EVERY 8 HOURS         Jonas Wall M.D.       Thu May 30, 2024  2:53 PM    05/30/24 1453  vancomycin (Vancocin) 3 g in  mL IVPB  (vancomycin (VANCOCIN) IV (LD + Maintenance))  ONCE        Note to Pharmacy: Dose per Pharmacokinetic Protocol    Myron Gilbert M.D.            Dosing Weight: 145 kg (319 lb 10.7 oz)      Admission History: Admitted on 5/30/2024 for Wound infection [T14.8XXA, L08.9]  Pertinent history: Patient presenting with groin pain and supra pubic surgical site infection. Patient was recently discharged after Type B aortic dissection repair with right femoral-femoral artery bypass graft. Patient has extensive substance abuse history including meth. WBC noted to be elevated at 11.4. MD notes pustular drainage from groin wound. Empiric zosyn and vanco therapy initiated.    Allergies:     Patient has no known allergies.     Pertinent cultures to date:     Results       Procedure Component Value Units Date/Time    MRSA By PCR (Amp) [[id number]]     Order Status: Sent Specimen: Respirate from Nares     CULTURE WOUND W/ GRAM STAIN  "[[id number]]     Order Status: Sent Specimen: Wound from Exudate     Blood Culture - Draw one from central line and one from peripheral site [[id number]] Collected: 24 1529    Order Status: Sent Specimen: Blood from Line Updated: 24    CoV-2, Flu A/B, And RSV by PCR ([facility]) [[id number]]     Order Status: Sent Specimen: Respirate     Blood Culture - Draw one from central line and one from peripheral site [[id number]] Collected: 24 151    Order Status: Sent Specimen: Blood from Peripheral Updated: 24 153    Urinalysis [[id number]]     Order Status: Sent Specimen: Urine     Urine Culture (New) [[id number]]     Order Status: Sent Specimen: Urine             Labs:     Estimated Creatinine Clearance: 123.4 mL/min (by C-G formula based on SCr of 1.01 mg/dL).  Recent Labs     24  1305   WBC 11.4*   NEUTSPOLYS 80.20*     Recent Labs     24  1305   BUN 21   CREATININE 1.01   ALBUMIN 2.8*     No intake or output data in the 24 hours ending 24   /74   Pulse 70   Temp 37.2 °C (98.9 °F) (Oral)   Resp 15   Ht 1.778 m (5' 10\")   Wt (!) 145 kg (320 lb)   SpO2 95%  Temp (24hrs), Av.2 °C (98.9 °F), Min:37.2 °C (98.9 °F), Max:37.2 °C (98.9 °F)      List concerns for Vancomycin clearance:     Obesity;Receipt of contrast dye;Hypermetabolic State (SIRS);CHF    Pharmacokinetics:     AUC kinetics:   Ke (hr ^-1): 0.1065 hr^-1  Half life: 6.51 hr  Clearance: 6.332  Estimated TDD: 3166  Estimated Dose: 1121  Estimated interval: 8.5    A/P:     -  Vancomycin dose: 3g loading dose followed by 1750mg Q12h    -  Next vancomycin level(s):    - None ordered at this time. Likely obtain levels after the 4th maintenance dose unless clinical status or renal function changes.     -  Predicted vancomycin AUC from initial AUC test calculator: 553 mg·hr/L    -  Comments: Concerns for accumulation listed above. MRSA nares swab ordered. Pharmacy will continue to follow and adjust therapy as " clinically appropriate.    Seun Rai, PharmD

## 2024-05-31 NOTE — ASSESSMENT & PLAN NOTE
Tobacco cessation education provided for more than 11 minutes.  Explained to the patient that he has COPD and peripheral vascular disease.  He recently had surgery that would be worse with smoking cigarettes.  We discussed the risks of smoking including increased risk of heart disease, stroke, cancer and COPD. We discussed the benefits of quitting smoking. We discussed options of nicotine patch, wellbutrin and chantix.  The patient has the intention to quit smoking. Nicotine replacement protocol will be provided to the patient.

## 2024-05-31 NOTE — ANESTHESIA TIME REPORT
Anesthesia Start and Stop Event Times       Date Time Event    5/31/2024 1007 Ready for Procedure     1031 Anesthesia Start     1510 Anesthesia Stop          Responsible Staff  05/31/24      Name Role Begin End    Gregorio Cramer M.D. Anesth 1031 1510          Overtime Reason:  no overtime (within assigned shift)    Comments:

## 2024-05-31 NOTE — ED NOTES
Med Rec complete per patient and RX bottles at bedside (returned)   Allergies reviewed  Antibiotics in the past 30 days:no  Anticoagulant in past 14 days:no  Pharmacy patient utilizes:[facility]

## 2024-05-31 NOTE — CARE PLAN
Problem: Care Map:  Admission Optimal Outcome for the Heart Failure Patient  Goal: Admission:  Optimal Care of the heart failure patient  Description: Target End Date:  end of day 1  Outcome: Progressing     Problem: Care Map:  Day 1 Optimal Outcome for the Heart Failure Patient  Goal: Day 1:  Optimal Care of the heart failure patient  Description: Target End Date:  end of day 1  Outcome: Progressing     Problem: Knowledge Deficit - Standard  Goal: Patient and family/care givers will demonstrate understanding of plan of care, disease process/condition, diagnostic tests and medications  Description: Target End Date:  1-3 days or as soon as patient condition allows    Document in Patient Education    1.  Patient and family/caregiver oriented to unit, equipment, visitation policy and means for communicating concern  2.  Complete/review Learning Assessment  3.  Assess knowledge level of disease process/condition, treatment plan, diagnostic tests and medications  4.  Explain disease process/condition, treatment plan, diagnostic tests and medications  Outcome: Progressing     Problem: Fall Risk  Goal: Patient will remain free from falls  Description: Target End Date:  Prior to discharge or change in level of care    Document interventions on the [place] Fall Risk Assessment    1.  Assess for fall risk factors  2.  Implement fall precautions  Outcome: Progressing   The patient is Watcher - Medium risk of patient condition declining or worsening         Progress made toward(s) clinical / shift goals:  VSS, safety    Patient is not progressing towards the following goals:

## 2024-05-31 NOTE — ED NOTES
Bedside report from BEATA Mccauley. Pt resting in [facility] comfortably, on monitor, call light in reach.  Pt provided meal tray and water  Pt is on 3L nc, GCS 15.  Necessary fall precautions in place.

## 2024-05-31 NOTE — CONSULTS
Vascular Surgery          New Patient Consultation    Patient:Rene Kelley  MRN:[id number]    Date: 5/30/2024    Referring Provider: Jonas Wall M.d.    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 male who presented with an acute type B aortic dissection on 5/21/2024 and he underwent surgery to repair the dissection.  Specifically the patient had a thoracic aortic stent graft placed in the proximal descending thoracic aorta to cover the entry tear.  The patient also had bilateral lower extremity ischemia and the left lower extremity was reperfused with iliac stenting, however the right iliac artery could not be recanalized with stent placement and so a left to right femoral to femoral dacron bypass was placed.  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  When I came to see him and examined his groin incisions the right 1 is still closed with staples however a few of the staples were removed from the left groin incision and purulent drainage was expressed from the wound.  A sample of this fluid has already been sent for culture.  The patient is on empiric antibiotics with Vanco and Zosyn.  Fortunately the patient is alert and conversant and hemodynamically stable.    No abdominal pain    History reviewed. No pertinent past medical history.    Past Surgical History:   Procedure Laterality Date    ABDOMINAL AORTIC ANEURYSM Bilateral 5/21/2024    Procedure: AORTIC DISSECTION REPAIR;  Surgeon: Gagandeep Arnett M.D.;  Location: SURGERY [facility];  Service: General    ECHOCARDIOGRAM, TRANSESOPHAGEAL, INTRAOPERATIVE N/A 5/21/2024    Procedure: ECHOCARDIOGRAM, TRANSESOPHAGEAL,  INTRAOPERATIVE;  Surgeon: Gagandeep Arnett M.D.;  Location: SURGERY [facility];  Service: General    FEMORAL FEMORAL BYPASS Bilateral 5/21/2024    Procedure: CREATION, BYPASS, ARTERIAL, FEMORAL TO FEMORAL, USING GRAFT;  Surgeon: Gagandeep Arnett M.D.;  Location: SURGERY [facility];  Service: General       Current Facility-Administered Medications   Medication Dose Route Frequency Provider Last Rate Last Admin    acetaminophen (Tylenol) tablet 650 mg  650 mg Oral Q6HRS PRN Jonas Wall M.D.        Pharmacy Consult Request ...Pain Management Review 1 Each  1 Each Other PHARMACY TO DOSE Jonas Wall M.D.        oxyCODONE immediate-release (Roxicodone) tablet 5 mg  5 mg Oral Q3HRS PRN Jonas Wall M.D.        Or    oxyCODONE immediate release (Roxicodone) tablet 10 mg  10 mg Oral Q3HRS PRN Jonas Wall M.D.        Or    HYDROmorphone (Dilaudid) injection 0.5 mg  0.5 mg Intravenous Q3HRS PRN Jonas Wall M.D.        labetalol (Normodyne/Trandate) injection 10 mg  10 mg Intravenous Q4HRS PRN Jonas Wall M.D.        ondansetron (Zofran) syringe/vial injection 4 mg  4 mg Intravenous Q4HRS PRN Jonas Wall M.D.        ondansetron (Zofran ODT) dispertab 4 mg  4 mg Oral Q4HRS PRN Jonas Wall M.D.        promethazine (Phenergan) tablet 12.5-25 mg  12.5-25 mg Oral Q4HRS PRN Jonas Wall M.D.        promethazine (Phenergan) suppository 12.5-25 mg  12.5-25 mg Rectal Q4HRS PRN Jonas Wall M.D.        prochlorperazine (Compazine) injection 5-10 mg  5-10 mg Intravenous Q4HRS PREARNESTINE Wall M.D.        [START ON 5/31/2024] amLODIPine (Norvasc) tablet 10 mg  10 mg Oral DAILY Jonas Wall M.D.        carvedilol (Coreg) tablet 12.5 mg  12.5 mg Oral BID WITH MEALS Jonas Wall M.D.   12.5 mg at 05/30/24 1949    [START ON 5/31/2024] fluticasone-umeclidinium-vilanterol (Trelegy Ellipta) 100-62.5-25 mcg/act inhaler 1 Puff  1 Puff Inhalation DAILY Jonas Wall M.D.        gabapentin (Neurontin) capsule 100 mg  100 mg Oral TID  Jonas Wall M.D.   100 mg at 05/30/24 1948    [START ON 5/31/2024] levothyroxine (Synthroid) tablet 100 mcg  100 mcg Oral AM ES Jonas Wall M.D.        methocarbamol (Robaxin) tablet 500 mg  500 mg Oral 4X/DAY Jonas Wall M.D.        [START ON 5/31/2024] nicotine (Nicoderm) 21 MG/24HR 21 mg  1 Patch Transdermal Q24HRS Jonas Wall M.D.        [START ON 5/31/2024] famotidine (Pepcid) tablet 20 mg  20 mg Oral DAILY Jonas Wall M.D.        ipratropium-albuterol (DUONEB) nebulizer solution  3 mL Nebulization Once Jonas Wall M.D.        furosemide (Lasix) injection 40 mg  40 mg Intravenous Q8HRS Jonas Wall M.D.   40 mg at 05/30/24 1949    piperacillin-tazobactam (Zosyn) 3.375 g in  mL IVPB  3.375 g Intravenous Q8HRS Jonas Wall M.D. MD Alert...Vancomycin per Pharmacy   Other PHARMACY TO DOSE Jonas Wall M.D.        [START ON 5/31/2024] vancomycin (Vancocin) 1,750 mg in  mL IVPB  1,750 mg Intravenous Q12HR Jonas Wall M.D.           Social History     Socioeconomic History    Marital status: Single     Spouse name: Not on file    Number of children: Not on file    Years of education: Not on file    Highest education level: Not on file   Occupational History    Not on file   Tobacco Use    Smoking status: Every Day     Current packs/day: 5.00     Average packs/day: 5.0 packs/day (0.1 ttl pk-yrs)     Types: Cigarettes     Start date: 5/20/2024    Smokeless tobacco: Former    Tobacco comments:     Pt reports 1-2 cigs per day.    Vaping Use    Vaping status: Never Used   Substance and Sexual Activity    Alcohol use: Yes     Comment: [facility] beer    Drug use: Never    Sexual activity: Not on file   Other Topics Concern    Not on file   Social History Narrative    Not on file     Social Determinants of Health     Financial Resource Strain: Not on file   Food Insecurity: No Food Insecurity (5/24/2024)    Hunger Vital Sign     Worried About Running Out of Food in the Last Year: Never true      "Ran Out of Food in the Last Year: Never true   Transportation Needs: Unmet Transportation Needs (5/24/2024)    PRAPARE - Transportation     Lack of Transportation (Medical): Yes     Lack of Transportation (Non-Medical): Yes   Physical Activity: Not on file   Stress: Not on file   Social Connections: Not on file   Intimate Partner Violence: Not At Risk (5/24/2024)    Humiliation, Afraid, Rape, and Kick questionnaire     Fear of Current or Ex-Partner: No     Emotionally Abused: No     Physically Abused: No     Sexually Abused: No   Housing Stability: Low Risk  (5/24/2024)    Housing Stability Vital Sign     Unable to Pay for Housing in the Last Year: No     Number of Places Lived in the Last Year: 1     Unstable Housing in the Last Year: No       History reviewed. No pertinent family history.    Allergies:  Patient has no known allergies.    Review of Systems:    Focused review of systems is noncontributory except as per HPI      Physical Exam:  /57   Pulse 81   Temp 36.7 °C (98.1 °F) (Temporal)   Resp 20   Ht 1.778 m (5' 10\")   Wt (!) 154 kg (340 lb 6.2 oz)   SpO2 98%   BMI 48.84 kg/m²     Constitutional: Alert, oriented, no acute distress  HEENT:  Normocephalic and atraumatic, EOMI  Neck:   Supple, no JVD,   Cardiovascular: Regular rate and rhythm,   Pulmonary:  Good air entry bilaterally,    Abdominal:  Soft, non-tender, non-distended  Musculoskeletal: No tenderness, no deformity  Neurological:  CN II-XII grossly intact, no focal deficits  Skin:   Skin is warm and dry. No rash noted.  Vascular exam: Extremities are warm and adequately perfused.  There are bilateral oblique groin incisions that were closed with staples.  There is erythema surrounding both incisions consistent with cellulitis and there is a small amount of drainage through the staples on the right side but a large amount of purulent drainage from the left groin incision.      Labs:  Recent Labs     05/30/24  1305   WBC 11.4*   RBC 4.21* "   HEMOGLOBIN 13.6*   HEMATOCRIT 40.3*   MCV 95.7   MCH 32.3   MCHC 33.7   RDW 48.9   PLATELETCT 231   MPV 9.9     Recent Labs     05/30/24  1305   SODIUM 132*   POTASSIUM 4.0   CHLORIDE 99   CO2 20   GLUCOSE 81   BUN 21   CREATININE 1.01   CALCIUM 8.2*     Recent Labs     05/30/24  1305   APTT 30.3   INR 1.25*     Recent Labs     05/30/24  1305   ASTSGOT 33   ALTSGPT 28   TBILIRUBIN 1.6*   ALKPHOSPHAT 93   GLOBULIN 3.4   INR 1.25*         Radiology:  CTA of the thoracoabdominal aorta was repeated tonight and it shows that the thoracic stent graft is in good position however there is a persistent false lumen and slight enlargement in the overall diameter of the aorta at the level of the stent graft.  CTA shows that the femoral to femoral bypass is patent.  It appears that the right common iliac artery is occluded from the dissection flap.  There is a right external iliac artery stent.  There is a well-positioned the left common iliac artery stent with good flow down the left iliac system into the left leg and into the femoral to femoral bypass graft.      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  The femoral to femoral bypass graft will need to be removed.  However in order to maintain perfusion of the right lower extremity we will plan to repeat attempts at recanalizing the right common iliac artery by endovascular approach.  The common femoral arteriotomy is will either be patched with autologous vein or close primarily and most likely will need to be protected with sartorius muscle flaps.  The groin wounds will then need to be treated with wound VAC therapy in order to eradicate the infection and maximize chances of healing without further complications.  Surgery is scheduled for tomorrow morning which is Friday, 5/31/2024 at  approximately 10:30 AM.  Patient will remain n.p.o. except for medications after midnight.  Patient will be on empiric vancomycin and Zosyn until the cultures result.    Appreciate hospitalist services support      Vishal Alcantara MD  [facility] V[facility] Surgery   [place] preferred or call my office [phone]  __________________________________________________________________  Patient:Rene Kelley   MRN:[id number]   CSN:[id number]

## 2024-06-01 LAB
ANION GAP SERPL CALC-SCNC: 11 MMOL/L (ref 7–16)
BACTERIA UR CULT: NORMAL
BUN SERPL-MCNC: 23 MG/DL (ref 8–22)
CALCIUM SERPL-MCNC: 7.4 MG/DL (ref 8.5–10.5)
CHLORIDE SERPL-SCNC: 99 MMOL/L (ref 96–112)
CO2 SERPL-SCNC: 24 MMOL/L (ref 20–33)
CREAT SERPL-MCNC: 1.26 MG/DL (ref 0.5–1.4)
ERYTHROCYTE [DISTWIDTH] IN BLOOD BY AUTOMATED COUNT: 51.4 FL (ref 35.9–50)
GFR SERPLBLD CREATININE-BSD FMLA CKD-EPI: 68 ML/MIN/1.73 M 2
GLUCOSE SERPL-MCNC: 100 MG/DL (ref 65–99)
HCT VFR BLD AUTO: 36.6 % (ref 42–52)
HGB BLD-MCNC: 12.2 G/DL (ref 14–18)
MCH RBC QN AUTO: 32.9 PG (ref 27–33)
MCHC RBC AUTO-ENTMCNC: 33.3 G/DL (ref 32.3–36.5)
MCV RBC AUTO: 98.7 FL (ref 81.4–97.8)
PLATELET # BLD AUTO: 219 K/UL (ref 164–446)
PMV BLD AUTO: 10.2 FL (ref 9–12.9)
POTASSIUM SERPL-SCNC: 4.2 MMOL/L (ref 3.6–5.5)
RBC # BLD AUTO: 3.71 M/UL (ref 4.7–6.1)
SIGNIFICANT IND 70042: NORMAL
SITE SITE: NORMAL
SODIUM SERPL-SCNC: 134 MMOL/L (ref 135–145)
SOURCE SOURCE: NORMAL
WBC # BLD AUTO: 10.7 K/UL (ref 4.8–10.8)

## 2024-06-01 PROCEDURE — 700111 HCHG RX REV CODE 636 W/ 250 OVERRIDE (IP): Mod: JZ | Performed by: INTERNAL MEDICINE

## 2024-06-01 PROCEDURE — 700111 HCHG RX REV CODE 636 W/ 250 OVERRIDE (IP): Performed by: HOSPITALIST

## 2024-06-01 PROCEDURE — 85027 COMPLETE CBC AUTOMATED: CPT

## 2024-06-01 PROCEDURE — 99233 SBSQ HOSP IP/OBS HIGH 50: CPT | Performed by: INTERNAL MEDICINE

## 2024-06-01 PROCEDURE — 97162 PT EVAL MOD COMPLEX 30 MIN: CPT

## 2024-06-01 PROCEDURE — 700102 HCHG RX REV CODE 250 W/ 637 OVERRIDE(OP): Performed by: HOSPITALIST

## 2024-06-01 PROCEDURE — 700111 HCHG RX REV CODE 636 W/ 250 OVERRIDE (IP): Performed by: SURGERY

## 2024-06-01 PROCEDURE — 36415 COLL VENOUS BLD VENIPUNCTURE: CPT

## 2024-06-01 PROCEDURE — 700105 HCHG RX REV CODE 258: Performed by: HOSPITALIST

## 2024-06-01 PROCEDURE — 700102 HCHG RX REV CODE 250 W/ 637 OVERRIDE(OP): Performed by: SURGERY

## 2024-06-01 PROCEDURE — 99223 1ST HOSP IP/OBS HIGH 75: CPT | Performed by: INTERNAL MEDICINE

## 2024-06-01 PROCEDURE — A9270 NON-COVERED ITEM OR SERVICE: HCPCS | Performed by: SURGERY

## 2024-06-01 PROCEDURE — 80048 BASIC METABOLIC PNL TOTAL CA: CPT

## 2024-06-01 PROCEDURE — 700105 HCHG RX REV CODE 258: Performed by: INTERNAL MEDICINE

## 2024-06-01 PROCEDURE — A9270 NON-COVERED ITEM OR SERVICE: HCPCS | Performed by: HOSPITALIST

## 2024-06-01 PROCEDURE — 770020 HCHG ROOM/CARE - TELE (206)

## 2024-06-01 RX ADMIN — VANCOMYCIN HYDROCHLORIDE 1750 MG: 5 INJECTION, POWDER, LYOPHILIZED, FOR SOLUTION INTRAVENOUS at 18:55

## 2024-06-01 RX ADMIN — ACETAMINOPHEN 1000 MG: 500 TABLET, FILM COATED ORAL at 05:50

## 2024-06-01 RX ADMIN — KETOROLAC TROMETHAMINE 15 MG: 15 INJECTION, SOLUTION INTRAMUSCULAR; INTRAVENOUS at 00:13

## 2024-06-01 RX ADMIN — FUROSEMIDE 40 MG: 10 INJECTION INTRAMUSCULAR; INTRAVENOUS at 05:49

## 2024-06-01 RX ADMIN — ACETAMINOPHEN 1000 MG: 500 TABLET, FILM COATED ORAL at 00:13

## 2024-06-01 RX ADMIN — GABAPENTIN 100 MG: 100 CAPSULE ORAL at 17:41

## 2024-06-01 RX ADMIN — KETOROLAC TROMETHAMINE 15 MG: 15 INJECTION, SOLUTION INTRAMUSCULAR; INTRAVENOUS at 17:42

## 2024-06-01 RX ADMIN — KETOROLAC TROMETHAMINE 15 MG: 15 INJECTION, SOLUTION INTRAMUSCULAR; INTRAVENOUS at 05:49

## 2024-06-01 RX ADMIN — FUROSEMIDE 40 MG: 10 INJECTION INTRAMUSCULAR; INTRAVENOUS at 21:19

## 2024-06-01 RX ADMIN — PIPERACILLIN AND TAZOBACTAM 4.5 G: 4; .5 INJECTION, POWDER, FOR SOLUTION INTRAVENOUS at 14:25

## 2024-06-01 RX ADMIN — NICOTINE TRANSDERMAL SYSTEM 21 MG: 21 PATCH, EXTENDED RELEASE TRANSDERMAL at 05:51

## 2024-06-01 RX ADMIN — FUROSEMIDE 40 MG: 10 INJECTION INTRAMUSCULAR; INTRAVENOUS at 14:10

## 2024-06-01 RX ADMIN — LEVOTHYROXINE SODIUM 100 MCG: 0.1 TABLET ORAL at 05:51

## 2024-06-01 RX ADMIN — CARVEDILOL 12.5 MG: 12.5 TABLET, FILM COATED ORAL at 08:38

## 2024-06-01 RX ADMIN — HEPARIN SODIUM 5000 UNITS: 5000 INJECTION, SOLUTION INTRAVENOUS; SUBCUTANEOUS at 14:10

## 2024-06-01 RX ADMIN — FAMOTIDINE 20 MG: 20 TABLET, FILM COATED ORAL at 05:51

## 2024-06-01 RX ADMIN — PIPERACILLIN AND TAZOBACTAM 4.5 G: 4; .5 INJECTION, POWDER, FOR SOLUTION INTRAVENOUS at 21:20

## 2024-06-01 RX ADMIN — METHOCARBAMOL TABLETS 500 MG: 500 TABLET, COATED ORAL at 08:38

## 2024-06-01 RX ADMIN — CARVEDILOL 12.5 MG: 12.5 TABLET, FILM COATED ORAL at 17:41

## 2024-06-01 RX ADMIN — ACETAMINOPHEN 1000 MG: 500 TABLET, FILM COATED ORAL at 17:41

## 2024-06-01 RX ADMIN — METHOCARBAMOL TABLETS 500 MG: 500 TABLET, COATED ORAL at 14:09

## 2024-06-01 RX ADMIN — HEPARIN SODIUM 5000 UNITS: 5000 INJECTION, SOLUTION INTRAVENOUS; SUBCUTANEOUS at 05:50

## 2024-06-01 RX ADMIN — ACETAMINOPHEN 1000 MG: 500 TABLET, FILM COATED ORAL at 14:09

## 2024-06-01 RX ADMIN — GABAPENTIN 100 MG: 100 CAPSULE ORAL at 14:08

## 2024-06-01 RX ADMIN — METHOCARBAMOL TABLETS 500 MG: 500 TABLET, COATED ORAL at 21:19

## 2024-06-01 RX ADMIN — VANCOMYCIN HYDROCHLORIDE 1750 MG: 5 INJECTION, POWDER, LYOPHILIZED, FOR SOLUTION INTRAVENOUS at 03:09

## 2024-06-01 RX ADMIN — PIPERACILLIN AND TAZOBACTAM 3.38 G: 3; .375 INJECTION, POWDER, FOR SOLUTION INTRAVENOUS at 05:34

## 2024-06-01 RX ADMIN — GABAPENTIN 100 MG: 100 CAPSULE ORAL at 05:51

## 2024-06-01 RX ADMIN — METHOCARBAMOL TABLETS 500 MG: 500 TABLET, COATED ORAL at 17:41

## 2024-06-01 RX ADMIN — HEPARIN SODIUM 5000 UNITS: 5000 INJECTION, SOLUTION INTRAVENOUS; SUBCUTANEOUS at 21:19

## 2024-06-01 RX ADMIN — KETOROLAC TROMETHAMINE 15 MG: 15 INJECTION, SOLUTION INTRAMUSCULAR; INTRAVENOUS at 14:09

## 2024-06-01 RX ADMIN — AMLODIPINE BESYLATE 10 MG: 10 TABLET ORAL at 05:51

## 2024-06-01 ASSESSMENT — ENCOUNTER SYMPTOMS
SHORTNESS OF BREATH: 0
DOUBLE VISION: 0
VOMITING: 0
WHEEZING: 1
PND: 1
PALPITATIONS: 1
MYALGIAS: 1
DIZZINESS: 1
DIARRHEA: 0
CONSTIPATION: 0
ORTHOPNEA: 1
SHORTNESS OF BREATH: 1
CHILLS: 0
COUGH: 0
WEAKNESS: 0
CHILLS: 1
ABDOMINAL PAIN: 1
NERVOUS/ANXIOUS: 0
COUGH: 1
BLURRED VISION: 0
NAUSEA: 1
FEVER: 1
FEVER: 0
SPUTUM PRODUCTION: 0
NAUSEA: 0

## 2024-06-01 ASSESSMENT — COGNITIVE AND FUNCTIONAL STATUS - GENERAL
MOVING FROM LYING ON BACK TO SITTING ON SIDE OF FLAT BED: A LITTLE
MOBILITY SCORE: 20
MOVING TO AND FROM BED TO CHAIR: A LITTLE
TURNING FROM BACK TO SIDE WHILE IN FLAT BAD: A LITTLE
SUGGESTED CMS G CODE MODIFIER MOBILITY: CJ
CLIMB 3 TO 5 STEPS WITH RAILING: A LITTLE

## 2024-06-01 ASSESSMENT — GAIT ASSESSMENTS
GAIT LEVEL OF ASSIST: SUPERVISED
DISTANCE (FEET): 25
ASSISTIVE DEVICE: OTHER (COMMENTS)
DEVIATION: ANTALGIC;INCREASED BASE OF SUPPORT

## 2024-06-01 ASSESSMENT — FIBROSIS 4 INDEX: FIB4 SCORE: 1.35

## 2024-06-01 ASSESSMENT — PAIN DESCRIPTION - PAIN TYPE: TYPE: ACUTE PAIN

## 2024-06-01 NOTE — CONSULTS
Consults  INFECTIOUS DISEASES INPATIENT CONSULT NOTE     Date of Service: 6/1/2024    Consult Requested By: Jairo Adan M.D.    Reason for Consultation: Vascular graft surgical site infection    History of Present Illness:   Rene Kelley is a 53 y.o.  admitted 5/30/2024. Pt has a past medical history of HTN, polysubstance abuse methamphetamines, tobacco abuse, obesity who presented to the hospital complaining of shortness of breath.  He been recently admitted with acute type B aortic dissection and underwent surgery to repair the dissection with thoracic aortic stent graft placed in the descending thoracic aorta.  Patient also with bilateral lower extremity ischemia and left lower extremity was reperfused with iliac stenting.  Per vascular note the right iliac artery could not be recanalized with stent placement so the left to right femoral to femoral bypass was placed.  Patient was complaining of erythema and drainage from the groin incisions.  Vascular surgery evaluated felt he had an infection in the femoral to femoral bypass graft area,.  Staples removed from the left groin incision and purulent drainage was expressed and sent for culture.  He went back to the OR with vascular surgery on 5/31.    Review Of Systems:  Review of Systems   Constitutional:  Negative for chills and fever.   HENT:  Negative for hearing loss.    Eyes:  Negative for blurred vision and double vision.   Respiratory:  Negative for cough, sputum production and shortness of breath.    Cardiovascular:  Negative for chest pain and leg swelling.   Gastrointestinal:  Positive for abdominal pain. Negative for constipation, diarrhea, nausea and vomiting.   Genitourinary:  Negative for dysuria.   Musculoskeletal:  Positive for myalgias.   Skin:  Negative for rash.   Neurological:  Negative for weakness.   Psychiatric/Behavioral:  The patient is not nervous/anxious.        PMH:   History reviewed. No pertinent past medical  history.    PSH:  Past Surgical History:   Procedure Laterality Date    ABDOMINAL AORTIC ANEURYSM Bilateral 5/21/2024    Procedure: AORTIC DISSECTION REPAIR;  Surgeon: Gagandeep Arnett M.D.;  Location: SURGERY [facility];  Service: General    ECHOCARDIOGRAM, TRANSESOPHAGEAL, INTRAOPERATIVE N/A 5/21/2024    Procedure: ECHOCARDIOGRAM, TRANSESOPHAGEAL, INTRAOPERATIVE;  Surgeon: Gagandeep Arnett M.D.;  Location: SURGERY [facility];  Service: General    FEMORAL FEMORAL BYPASS Bilateral 5/21/2024    Procedure: CREATION, BYPASS, ARTERIAL, FEMORAL TO FEMORAL, USING GRAFT;  Surgeon: Gagandeep Arnett M.D.;  Location: SURGERY [facility];  Service: General       FAMILY HX:  History reviewed. No pertinent family history.  Reviewed family history. No pertinent family history.     SOCIAL HX:  Social History     Socioeconomic History    Marital status: Single     Spouse name: Not on file    Number of children: Not on file    Years of education: Not on file    Highest education level: Not on file   Occupational History    Not on file   Tobacco Use    Smoking status: Every Day     Current packs/day: 5.00     Average packs/day: 5.0 packs/day (0.2 ttl pk-yrs)     Types: Cigarettes     Start date: 5/20/2024    Smokeless tobacco: Former    Tobacco comments:     Pt reports 1-2 cigs per day.    Vaping Use    Vaping status: Never Used   Substance and Sexual Activity    Alcohol use: Yes     Comment: OCC beer    Drug use: Never    Sexual activity: Not on file   Other Topics Concern    Not on file   Social History Narrative    Not on file     Social Determinants of Health     Financial Resource Strain: Not on file   Food Insecurity: No Food Insecurity (5/30/2024)    Hunger Vital Sign     Worried About Running Out of Food in the Last Year: Never true     Ran Out of Food in the Last Year: Never true   Transportation Needs: Unmet Transportation Needs (5/30/2024)    PRAPARE - Transportation     Lack of Transportation (Medical): Yes     Lack of  Transportation (Non-Medical): Yes   Physical Activity: Not on file   Stress: Not on file   Social Connections: Not on file   Intimate Partner Violence: Not At Risk (5/30/2024)    Humiliation, Afraid, Rape, and Kick questionnaire     Fear of Current or Ex-Partner: No     Emotionally Abused: No     Physically Abused: No     Sexually Abused: No   Housing Stability: Low Risk  (5/30/2024)    Housing Stability Vital Sign     Unable to Pay for Housing in the Last Year: No     Number of Places Lived in the Last Year: 1     Unstable Housing in the Last Year: No     Social History     Tobacco Use   Smoking Status Every Day    Current packs/day: 5.00    Average packs/day: 5.0 packs/day (0.2 ttl pk-yrs)    Types: Cigarettes    Start date: 5/20/2024   Smokeless Tobacco Former   Tobacco Comments    Pt reports 1-2 cigs per day.      Social History     Substance and Sexual Activity   Alcohol Use Yes    Comment: OCC beer       Allergies/Intolerances:  No Known Allergies    History reviewed with the patient and /or family member, chart & primary care team    Other Current Medications:    Current Facility-Administered Medications:     Pharmacy Consult Request ...Pain Management Review 1 Each, 1 Each, Other, PHARMACY TO DOSE, Vishal Alcantara M.D.    ondansetron (Zofran) syringe/vial injection 4 mg, 4 mg, Intravenous, Q4HRS PRN, Vishal Alcantara M.D.    dexamethasone (Decadron) injection 4 mg, 4 mg, Intravenous, Once PRN, Vishal Alcantara M.D.    diphenhydrAMINE (Benadryl) injection 25 mg, 25 mg, Intravenous, Q6HRS PRN, Vishal Alcantara M.D.    haloperidol lactate (Haldol) injection 1 mg, 1 mg, Intravenous, Q6HRS PRN, Vishal Alcantara M.D.    scopolamine (Transderm-Scop) patch 1 Patch, 1 Patch, Transdermal, Q72HRS PRN, Vishal Alcantara M.D.    heparin injection 5,000 Units, 5,000 Units, Subcutaneous, Q8HRS, Vishal Alcantara M.D., 5,000 Units at 06/01/24 0550    acetaminophen (Tylenol) tablet 1,000 mg, 1,000 mg,  Oral, Q6HRS, 1,000 mg at 06/01/24 0550 **FOLLOWED BY** [START ON 6/5/2024] acetaminophen (Tylenol) tablet 1,000 mg, 1,000 mg, Oral, Q6HRS PRN, Vishal Alcantara M.D.    ketorolac (Toradol) 15 MG/ML injection 15 mg, 15 mg, Intravenous, Q6HRS, 15 mg at 06/01/24 0549 **FOLLOWED BY** [START ON 6/3/2024] ibuprofen (Motrin) tablet 800 mg, 800 mg, Oral, TID PRN, Vishal Alcantara M.D.    oxyCODONE immediate-release (Roxicodone) tablet 5 mg, 5 mg, Oral, Q3HRS PRN **OR** oxyCODONE immediate release (Roxicodone) tablet 10 mg, 10 mg, Oral, Q3HRS PRN **OR** HYDROmorphone (Dilaudid) injection 0.5 mg, 0.5 mg, Intravenous, Q3HRS PRN, Vishal Alcantara M.D.    labetalol (Normodyne/Trandate) injection 10 mg, 10 mg, Intravenous, Q4HRS PRN, Jonas Wall M.D.    ondansetron (Zofran ODT) dispertab 4 mg, 4 mg, Oral, Q4HRS PRN, Jonas Wall M.D.    promethazine (Phenergan) tablet 12.5-25 mg, 12.5-25 mg, Oral, Q4HRS PRN, Jonas Wall M.D.    promethazine (Phenergan) suppository 12.5-25 mg, 12.5-25 mg, Rectal, Q4HRS PRN, Jonas Wall M.D.    prochlorperazine (Compazine) injection 5-10 mg, 5-10 mg, Intravenous, Q4HRS PRN, Jonas Wall M.D.    amLODIPine (Norvasc) tablet 10 mg, 10 mg, Oral, DAILY, Jonas Wall M.D., 10 mg at 06/01/24 0551    carvedilol (Coreg) tablet 12.5 mg, 12.5 mg, Oral, BID WITH MEALS, Jonas Wall M.D., 12.5 mg at 06/01/24 0838    fluticasone-umeclidinium-vilanterol (Trelegy Ellipta) 100-62.5-25 mcg/act inhaler 1 Puff, 1 Puff, Inhalation, DAILY, Jonas Wall M.D., 1 Puff at 05/31/24 0831    gabapentin (Neurontin) capsule 100 mg, 100 mg, Oral, TID, Jonas Wall M.D., 100 mg at 06/01/24 0551    levothyroxine (Synthroid) tablet 100 mcg, 100 mcg, Oral, AM ES, Jonas Wall M.D., 100 mcg at 06/01/24 0551    methocarbamol (Robaxin) tablet 500 mg, 500 mg, Oral, 4X/DAY, Jonas Wall M.D., 500 mg at 06/01/24 0838    nicotine (Nicoderm) 21 MG/24HR 21 mg, 1 Patch, Transdermal, Q24HRS, Jonas Wall M.D., 21 mg at  "24 0551    famotidine (Pepcid) tablet 20 mg, 20 mg, Oral, DAILY, Jonas Wall M.D., 20 mg at 24 0551    furosemide (Lasix) injection 40 mg, 40 mg, Intravenous, Q8HRS, Jonas Wall M.D., 40 mg at 24 0549    piperacillin-tazobactam (Zosyn) 3.375 g in  mL IVPB, 3.375 g, Intravenous, Q8HRS, Jonas Wall M.D., Last Rate: 25 mL/hr at 24 0534, 3.375 g at 24 0534    MD Alert...Vancomycin per Pharmacy, , Other, PHARMACY TO DOSE, Jonas Wall M.D.    vancomycin (Vancocin) 1,750 mg in  mL IVPB, 1,750 mg, Intravenous, Q12HR, Jonas Wall M.D., Stopped at 24 0509  [unfilled]    Most Recent Vital Signs:  /72   Pulse 66   Temp 36.8 °C (98.2 °F) (Temporal)   Resp 18   Ht 1.778 m (5' 10\")   Wt (!) 157 kg (345 lb 3.9 oz)   SpO2 92%   BMI 49.54 kg/m²   Temp  Av.6 °C (97.9 °F)  Min: 35.9 °C (96.7 °F)  Max: 37.2 °C (98.9 °F)    Physical Exam:  Physical Exam  Constitutional:       Appearance: Normal appearance. He is obese.   HENT:      Head: Normocephalic and atraumatic.      Right Ear: External ear normal.      Left Ear: External ear normal.      Nose: Nose normal.      Mouth/Throat:      Mouth: Mucous membranes are moist.      Pharynx: Oropharynx is clear.   Eyes:      Extraocular Movements: Extraocular movements intact.      Conjunctiva/sclera: Conjunctivae normal.      Pupils: Pupils are equal, round, and reactive to light.   Cardiovascular:      Rate and Rhythm: Normal rate and regular rhythm.      Heart sounds: Normal heart sounds.   Pulmonary:      Effort: Pulmonary effort is normal.      Breath sounds: Normal breath sounds.   Abdominal:      General: There is distension.      Tenderness: There is abdominal tenderness.   Musculoskeletal:      Cervical back: Normal range of motion and neck supple.      Comments: Groin area with wound vacs in place   Skin:     General: Skin is warm and dry.   Neurological:      General: No focal deficit present.      " Mental Status: He is alert and oriented to person, place, and time.   Psychiatric:         Mood and Affect: Mood normal.         Behavior: Behavior normal.           Pertinent Lab Results:  Recent Labs     05/30/24  1305 05/31/24  0027 06/01/24  0007   WBC 11.4* 10.2 10.7      Recent Labs     05/30/24  1305 05/31/24  0027 06/01/24  0007   HEMOGLOBIN 13.6* 13.3* 12.2*   HEMATOCRIT 40.3* 39.1* 36.6*   MCV 95.7 96.1 98.7*   MCH 32.3 32.7 32.9   PLATELETCT 231 204 219         Recent Labs     05/30/24  1305 05/31/24  0027 06/01/24  0007   SODIUM 132* 132* 134*   POTASSIUM 4.0 3.6 4.2   CHLORIDE 99 100 99   CO2 20 22 24   CREATININE 1.01 1.09 1.26        Recent Labs     05/30/24  1305 05/31/24  0027   ALBUMIN 2.8* 2.5*        Pertinent Micro:  Results       Procedure Component Value Units Date/Time    Urine Culture (New) [[id number]] Collected: 05/30/24 2150    Order Status: Completed Specimen: Urine Updated: 05/31/24 2058     Significant Indicator NEG     Source UR     Site -     Culture Result No growth at 24 hours.    Narrative:      Indication for culture:->Emergency Room Patient    CULTURE WOUND W/ GRAM STAIN [[id number]] Collected: 05/30/24 1635    Order Status: Completed Specimen: Wound from Exudate Updated: 05/31/24 1403     Significant Indicator NEG     Source WND     Site LLQ Abdomen     Culture Result Culture in progress.     Gram Stain Result Many WBCs.  No organisms seen.      Blood Culture - Draw one from central line and one from peripheral site [[id number]] Collected: 05/30/24 1529    Order Status: Completed Specimen: Blood from Line Updated: 05/31/24 0644     Significant Indicator NEG     Source BLD     Site Peripheral     Culture Result No Growth  Note: Blood cultures are incubated for 5 days and  are monitored continuously.Positive blood cultures  are called to the RN and reported as soon as  they are identified.      Narrative:      Right Hand    Blood Culture - Draw one from central line and one from  "peripheral site [[id number]] Collected: 05/30/24 1517    Order Status: Completed Specimen: Blood from Peripheral Updated: 05/31/24 0644     Significant Indicator NEG     Source BLD     Site PERIPHERAL     Culture Result No Growth  Note: Blood cultures are incubated for 5 days and  are monitored continuously.Positive blood cultures  are called to the RN and reported as soon as  they are identified.      Narrative:      Right Forearm/Arm    MRSA By PCR (Amp) [[id number]] Collected: 05/30/24 2150    Order Status: Completed Specimen: Respirate from Nares Updated: 05/30/24 2358     MRSA by PCR Negative    CoV-2, Flu A/B, And RSV by PCR (Cepheid) [[id number]] Collected: 05/30/24 2150    Order Status: Completed Specimen: Respirate Updated: 05/30/24 2303     Influenza virus A RNA Negative     Influenza virus B, PCR Negative     RSV, PCR Negative     SARS-CoV-2 by PCR NotDetected     Comment: [facility] providers: PLEASE REFER TO DE-ESCALATION AND RETESTING PROTOCOL  on [email or web address]    **The [employer] GeneXpert Xpress SARS-CoV-2 RT-PCR Test has been made  available for use under the Emergency Use Authorization (EUA) only.          SARS-CoV-2 Source NP Swab    GRAM STAIN [[id number]] Collected: 05/30/24 1635    Order Status: Completed Specimen: Wound Updated: 05/30/24 2236     Significant Indicator .     Source WND     Site LLQ Abdomen     Gram Stain Result Many WBCs.  No organisms seen.      Urinalysis [[id number]]  (Abnormal) Collected: 05/30/24 2150    Order Status: Completed Specimen: Urine Updated: 05/30/24 2220     Color Yellow     Character Clear     Specific Gravity 1.023     Ph 5.5     Glucose Negative mg/dL      Ketones Negative mg/dL      Protein Negative mg/dL      Bilirubin Negative     Urobilinogen, Urine 1.0     Nitrite Negative     Leukocyte Esterase Negative     Occult Blood Small     Micro Urine Req Microscopic          No results found for: \"BLOODCULTU\", \"BLDCULT\", \"BCHOLD\"     Studies:  CT-CTA COMPLETE " THORACOABDOMINAL AORTA    Result Date: 5/30/2024 5/30/2024 2:38 PM HISTORY/REASON FOR EXAM:  CHEST PAIN; SHORTNESS OF BREATH; DIZZINESS; GROIN PAIN; WOUND INFECTION. TECHNIQUE/EXAM DESCRIPTION: CT angiogram without and with contrast, with reconstructions. Initial precontrast thick helical sections were obtained from the top of the aortic arch through the diaphragmatic domes. Following this, thin-section postcontrast helical images were obtained from the lung apices through the iliac crests following the bolus administration of 100 mL of nonionic Omnipaque 350 contrast.  CT angiographic technique was utilized. Parasagittal reconstructed images of the aorta were generated utilizing multiplanar volume reformat technique. Low dose optimization technique was utilized for this CT exam including automated exposure control and adjustment of the mA and/or kV according to patient size. COMPARISON: CTA aorta 5/21/2024 FINDINGS: Aorta: Patient is status post thoracic aortic stent graft placement with the proximal landing zone just beyond the origin of the left subclavian artery and the distal landing zone in the mid descending thoracic aorta. There is contrast opacification of the false lumen extends inferiorly in the descending thoracic aorta to approximately the level of the distal landing zone of the stent graft. Aortic arch: Branching pattern is conventional. Diameter: There is aneurysmal dilation of the proximal descending thoracic aorta measuring 4.5 x 4.5 cm. Dissection: Present extending from the distal arch just beyond the level of the left subclavian artery origin inferiorly into the bilateral common iliac and external iliac arteries. Celiac artery: Supplied by the true and false lumens with the dissection flap extends into the celiac artery there is unchanged partial occlusion with distal flow. Superior mesenteric artery origin: Widely patent. Supplied by the true lumen Renal artery origins: Widely patent. The left  kidney is supplied by the false lumen Inferior mesenteric artery: Patent, supplied by the false lumen. Common iliac arteries: Interval placement of bilateral common iliac artery stents which appear to be patent. There is a femoral to femoral artery bypass. Bilateral superficial femoral and profunda femoris arteries appear to be patent. There is mild stranding and multiple foci of gas in the lower anterior abdominal wall without focal drainable fluid collection to suggest abscess. Heart: Normal size. No pericardial effusion. Coronary artery origins are conventional. 3D angiographic/MIP images of the vasculature confirm the vascular findings as described above. Lungs: Diffuse bilateral tree-in-bud opacities. 15 mm lingular nodule. Left basilar atelectasis and/or consolidation. Trace left effusion. Liver: Homogeneous enhancement. No masses identified. Biliary system: No intrahepatic or extrahepatic ductal dilatation. The gallbladder is surgically absent. Spleen: Unremarkable. Adrenal glands: Unremarkable. Pancreas: Unremarkable. Kidneys: Symmetric enhancement. No solid mass is identified. There are nonobstructive bilateral renal calculi. Bowel: Unremarkable. No pneumoperitoneum. Ascites: None. Lymph nodes: No adenopathy is identified. Bones: Unremarkable.     1.  Type B aortic dissection status post thoracic stent graft. Dissection flap is unchanged extending from just beyond the left subclavian artery origin into the bilateral external iliac arteries. 2.  Proximal descending thoracic aortic aneurysm measuring 4.5 x 4.5 cm. 3.  Status post bilateral common iliac artery stent placement and femorofemoral bypass. Stents and distal femoral arteries appear patent. 4.  Mild stranding and multiple foci of gas in the lower anterior abdominal wall without focal drainable fluid collection just chest abscess. 5.  Redemonstrated extension of the abdominal aortic dissection into the celiac artery which supplied by both the true and  false lumen. Distal vessels appear patent. 6.  Diffuse bilateral tree-in-bud opacities which could be seen in setting edema and/or infection. 7.  15 mm lingular pulmonary nodule. 8.  Trace left pleural effusion. 9.  Nonobstructive bilateral renal calculi. [place] Type B aortic dissection Fleischner Society pulmonary nodule recommendations: Low and High Risk: Consider CT at 3 months, PET/CT, or tissue sampling. Low Risk - Minimal or absent history of smoking and of other known risk factors. High Risk - History of smoking or of other known risk factors. Note: These recommendations do not apply to lung cancer screening, patients with immunosuppression, or patients with known primary cancer. Fleischner Society 2017 Guidelines for Management of Incidentally Detected Pulmonary Nodules in Adults     DX-CHEST-PORTABLE (1 VIEW)    Result Date: 5/30/2024 5/30/2024 2:57 PM HISTORY/REASON FOR EXAM:  Sepsis; sepsis TECHNIQUE/EXAM DESCRIPTION AND NUMBER OF VIEWS: Single portable view of the chest. COMPARISON: 5 20 5/24 FINDINGS: Thoracic aortic endograft redemonstrated. HEART: Stable size. LUNGS: LEFT lateral mid lung opacity is slightly decreased. PLEURA: There is slight blunting of the LEFT costophrenic sulcus.     1.  Trace LEFT pleural effusion 2.  Decreased LEFT peripheral pulmonary opacity which could be atelectasis or pneumonia    DX-CHEST-PORTABLE (1 VIEW)    Result Date: 5/25/2024 5/25/2024 5:23 AM HISTORY/REASON FOR EXAM:  Chest Pain. TECHNIQUE/EXAM DESCRIPTION AND NUMBER OF VIEWS: Single portable view of the chest. COMPARISON: 5/24/2024 FINDINGS: Thoracic stent graft noted. Cardiac silhouette is normal in size. Stable mild left basilar atelectasis. Stable left pleural fluid and thickening, resulting in left lower thoracic hazy opacification.     No change. Stable left lower thoracic subsegmental atelectasis and small amount of left pleural fluid.    US-RENAL    Result Date: 5/24/2024 5/24/2024 8:14 PM  HISTORY/REASON FOR EXAM:  Abnormal Labs TECHNIQUE/EXAM DESCRIPTION: Renal ultrasound. COMPARISON:  None FINDINGS: The right kidney measures 11.59 cm.  The right kidney appears normal in contour and parenchymal echotexture. The corticomedullary differentiation is preserved. The right renal collecting system is not dilated. No hydronephrosis. There are no renal calculi. The left kidney measures 11.61 cm. The left kidney appears normal in contour and parenchymal echotexture. The corticomedullary differentiation is preserved. The left renal collecting system is not dilated. No hydronephrosis. There are no renal calculi. The bladder is decompressed.     Limited exam. 1.  No hydronephrosis. No renal calculus.    DX-CHEST-PORTABLE (1 VIEW)    Result Date: 5/24/2024 5/24/2024 5:26 AM HISTORY/REASON FOR EXAM: Other (Comment); Intubated TECHNIQUE/EXAM DESCRIPTION:  Single AP view of the chest. COMPARISON: Yesterday FINDINGS: Position of medical devices appears stable. Cardiomegaly is observed. The mediastinal contour appears within normal limits.  The central  pulmonary vasculature appears prominent and indistinct. Bilateral lung volumes are diminished.  Diffuse scattered hazy pulmonary parenchymal opacities are seen. Blunting of the left costophrenic angle is seen suggesting trace left effusion. The bony structures appear age-appropriate.     1.  Pulmonary edema and/or infiltrates are identified, which are stable since the prior exam. 2.  Trace left pleural effusion, stable 3.  Cardiomegaly    EC-WILFREDO W/O CONT    Result Date: 5/23/2024  Transesophageal Echo Report Echocardiography Laboratory CONCLUSIONS Intraoperative WILFREDO during Type B dissection repair (TEVAR).  Normal biventricular function.  Extensive Type B dissection that appears to originateat the distal aortic arch near the left subclavian artery and extends distally to the visualized portion of the descending aorta.  There appears to be clot visualized in the false  lumen.  Findings communicated at the time of exam. JAYA DUCKWORTH Exam Date:          2024                     12:00 Exam Location:      Inpatient Priority:            Routine Ordering Physician:        MILKA SCOTT Referring Physician: Sonographer:               Raquel MADDEN Age:    53     Gender:    M MRN:    [id number] :    1970 BSA:           Ht (in):           Wt (lb): Report Type:      Complete Indications:     Dissection of unspecified site of aorta ICD Codes:       I71.00 CPT Codes:       86331 BP:          /          HR: Technical Quality:       Fair MEASUREMENTS  (Male / Female) Normal Values 2D ECHO Estimated LV Ejection Fraction    55 %                  * Indicates values subject to auto-interpretation LV EF:  55    % Medications Medications determined by anesthesiologist. Limitations none Complications none Proc. Components The probe was inserted and manipulated by Dr Rodriguez. Epiq probe #OR 2  was used for this procedure. 2D, color Doppler, spectral Doppler, and 3D imaging were used as part of the evaluation as clinically indicated. FINDINGS Left Ventricle The left ventricle is normal in size and thickness. Right Ventricle The right ventricle is normal in size and systolic function. Right Atrium The right atrium is normal in size. Left Atrium The left atrium is normal in size. LA Appendage Normal left atrial appendage. IA Septum The interatrial septum is normal. IV Septum The interventricular septum is normal. Mitral Valve Structurally normal mitral valve without significant stenosis or regurgitation. Aortic Valve Structurally normal aortic valve without significant stenosis or regurgitation. Tricuspid Valve Structurally normal tricuspid valve without significant stenosis or regurgitation. Pulmonic Valve Structurally normal pulmonic valve without significant stenosis or regurgitation. Pericardium No pericardial effusion. Aorta Extensive Type B  dissection that appears to originateat the distal aortic arch near the left subclavian artery and extends distally to the visualized portion of the descending aorta.  There appears to be clot visualized in the false lumen. Elias X Michael (Electronically Signed) Final Date:     23 May 2024 15:15    DX-CHEST-PORTABLE (1 VIEW)    Result Date: 5/23/2024 5/23/2024 5:41 AM HISTORY/REASON FOR EXAM: Other (Comment); Intubated TECHNIQUE/EXAM DESCRIPTION:  Single AP view of the chest. COMPARISON: Yesterday FINDINGS: Endotracheal tube has been removed in the interim.  Nasogastric tube has been removed in the interim.  Otherwise medical device position is stable. Cardiomegaly is observed. The mediastinal contour appears within normal limits.  The central  pulmonary vasculature appears prominent and indistinct. Bilateral lung volumes are diminished.  Diffuse scattered hazy pulmonary parenchymal opacities are seen. Blunting of the left costophrenic angle is seen suggesting trace left effusion. The bony structures appear age-appropriate.     1.  Pulmonary edema and/or infiltrates are identified, which are stable since the prior exam. 2.  Trace left pleural effusion 3.  Cardiomegaly    DX-CHEST-PORTABLE (1 VIEW)    Result Date: 5/22/2024 5/22/2024 5:34 AM HISTORY/REASON FOR EXAM: Other (Comment); Intubated TECHNIQUE/EXAM DESCRIPTION:  Single AP view of the chest. COMPARISON: Yesterday FINDINGS: Nasogastric tube terminates below the lower margin of the film within the abdomen.  Otherwise medical device position is stable. Cardiomegaly is observed. The mediastinal contour appears within normal limits.  The central pulmonary vasculature appears normal. Bilateral lung volumes are diminished.  Diffuse scattered hazy pulmonary parenchymal opacities are seen. No significant pleural effusions are identified. The bony structures appear age-appropriate.     1.  Mild pulmonary edema and/or infiltrates. 2.  Cardiomegaly    DX-ABDOMEN FOR  TUBE PLACEMENT    Result Date: 5/22/2024 5/22/2024 5:34 AM HISTORY/REASON FOR EXAM: OG tube placement TECHNIQUE/EXAM DESCRIPTION: Single AP view of the abdomen COMPARISON:  None FINDINGS: Hazy left lower lobe opacities are seen. Small left pleural effusion is seen. Nasogastric tube is seen, the tip overlies the lumbar spine.  The bowel gas pattern in the upper abdomen appears nonspecific. The bony structures appear age-appropriate.     1.  Nonspecific bowel gas pattern in the upper abdomen. 2.  Nasogastric tube tip terminates overlying the expected location of the gastric antrum. 3.  Hazy left lower lobe infiltrate. 4.  Small left pleural effusion    DX-CHEST-LIMITED (1 VIEW)    Result Date: 5/21/2024 5/21/2024 2:49 PM HISTORY/REASON FOR EXAM:  Life support line and tube placement. TECHNIQUE/EXAM DESCRIPTION AND NUMBER OF VIEWS: Single portable view of the chest. COMPARISON: CTA aorta and runoffs, 5/21/2024. FINDINGS: The endotracheal tube terminates 5.9 cm above the elier. The right internal jugular central venous access catheter terminates over the superior vena cava. There is parenchymal volume loss involving the left lung, with a small left pleural effusion. The right lung is clear. The right costophrenic recess is sharp. No visible pneumothorax bilaterally. The cardiac silhouette size is enlarged. There is a metallic stent in the descending thoracic aorta.     1. Appropriately positioned endotracheal tube and right internal jugular central venous access catheter. 2. No postprocedure visible pneumothorax. 3. Lingular atelectasis with small left pleural effusion.    CT-LOWER EXTREMITY BILATERAL WITH CONTRAST    Result Date: 5/21/2024 5/21/2024 9:11 AM HISTORY/REASON FOR EXAM:  Aortic dissection, right leg arterial insufficiency TECHNIQUE/EXAM DESCRIPTION AND NUMBER OF VIEWS: CT angiogram of the right lower extremity following the intravenous demonstration of 100 mL Omnipaque 350 COMPARISON: CT angiogram of  the chest, abdomen, and pelvis 5/18/2024 FINDINGS: Again identified is a type B aortic dissection. This extends into the right and left common iliac arteries, proximal aspect of the left external iliac artery, and into the right external iliac artery. There is thrombus throughout the right common iliac artery, external iliac artery, and reconstitution at the level of the common femoral artery. Right superficial femoral artery has diminished but present flow. There is marked decrease in flow in the right popliteal artery In the leg no large vessel arterial flow is identified.     1.  Aortic dissection extending into the right common iliac and right external iliac arteries with associated thrombus 2.  Reconstitution at the level of the common femoral and superficial femoral arteries 3.  At the level of the mid thigh there is lack of detectable flow within the superficial femoral artery 4.  Lack of detectable flow within the popliteal artery or the arteries of the right calf    CT-CTA AORTA-RO WITH & W/O-POST PROCESS    Result Date: 5/21/2024 5/21/2024 8:53 AM HISTORY/REASON FOR EXAM:  Chest and back pain, evaluate for aortic dissection TECHNIQUE/EXAM DESCRIPTION: CT angiogram of the abdominal aorta with runoff without and with contrast, with reconstructions. Initial precontrast images were obtained from the diaphragm through the lesser trochanters. Subsequently, thin section postcontrast helical images were obtained from the diaphragm through the ankles following the IV bolus administration of 145 mL of Omnipaque 350. CT angiographic technique was utilized. 3D angiographic images were reviewed on PACS. Maximum intensity projection (MIP) images were generated and reviewed. Low dose optimization technique was utilized for this CT exam including automated exposure control and adjustment of the mA and/or kV according to patient size. COMPARISON: CTA chest FINDINGS: Noncontrast images show no intramural hematoma.  Contrast-enhanced images: The ascending aorta is normal in appearance There is a aortic dissection. This originates at the level of the left subclavian artery, extends to the abdomen, and there is involvement of the celiac axis where there is thrombus. The superior mesenteric artery is patent. Single right and single left renal arteries are patent Dissection extends into both common iliac artery. There is thrombus within the right common and external iliac arteries. Osseous structures: Within normal limits. Chest: LUNGS: There is linear density within the lingular segment left upper lobe in the left lower lobe. This represents atelectasis. MEDIASTINUM: There is no adenopathy, mass, or other abnormality within the axilla, the mediastinum, or the jaclyn. PLEURA: There no pleural effusion or pneumothoraces. Abdomen: LIVER: Liver shows morphologic changes consistent with cirrhosis. There is also decreased in density consistent with hepatic steatosis and there is hepatomegaly. SPLEEN: The spleen is normal in appearance. PANCREAS: The pancreas is normal in appearance. GALLBLADDER and biliary system: No gallstones or biliary dilation identified Venous vasculature: The splenic vein and portal vein enhance normally. ADRENAL GLANDS: The adrenal glands are normal in appearance. KIDNEYS: The kidneys are normal in appearance. BOWEL: No bowel or mesenteric abnormality identified. Pelvis: Within normal limits. 3D angiographic/MIP images of the vasculature confirm the vascular findings as described above.     1.  Otilio type B aortic dissection 2.  Importantly, there is thrombus filling the celiac axis and there is distal flow that is likely reconstitution. 3.  The renal arteries are patent. 4.  There is thrombosis/occlusion of the right common iliac artery and right external iliac artery extending to the femoral artery. The visualized femoral artery is patent. 5.  Findings were discussed with TROY BALL on 5/21/2024 10:38  AM.      ASSESSMENT/PLAN:     53 y.o.  admitted 5/30/2024. Pt has a past medical history of methamphetamine use, tobacco abuse, obesity who presented to the hospital complaining of shortness of breath.  He been recently admitted with acute type B aortic dissection and underwent surgery to repair the dissection with thoracic aortic stent graft placed in the descending thoracic aorta.  Patient also with bilateral lower extremity ischemia and left lower extremity was reperfused with iliac stenting.  Per vascular note the right iliac artery could not be recanalized with stent placement so the left to right femoral to femoral bypass was placed.  Patient was complaining of erythema and drainage from the groin incisions.  Vascular surgery evaluated felt he had an infection in the femoral to femoral bypass graft area,.  Staples removed from the left groin incision and purulent drainage was expressed and sent for culture.  He went back to the OR with vascular surgery on 5/31.    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, new stenting placed into infected space  -OR with vascular surgery on 5/31 with removal of infected prosthetic femoral-femoral bypass graft and primary closure, placement of kissing bilateral common iliac artery stents, patient of right side torso muscle flap and application of bilateral groin wound VAC  -MRSA nares negative  Recent acute type B aortic dissection extending from the left subclavian artery into both iliac arteries  -Status post surgical repair of the aortic dissection, bypass with stenting as described above  MAIDA  History of methamphetamine abuse  -UDS on 5/22 positive for amphetamines, fentanyl and opiates  Tobacco abuse    Assessment:      -Notes were reviewed from primary team, radiology, ED, surgery, specialists, etc.   -Reviewed labs to date, microbiology for current admit and prior  -Imaging was independently reviewed and  interpreted    Plan:    Recommendations for antibiotics:   --- Continue vancomycin and Zosyn for now, increased Zosyn to 4/5 g q8h    Recommendations for further/ongoing work-up, monitoring of clinical status and drug toxicity:  --- Follow-up wound culture, no growth to date  --- Follow-up blood cultures, no growth to date  --- Monitor lab  --- Wound care  --- Wound VAC management per surgery      Dispo: Awaiting culture results and work-up as above.  Patient is at risk for infectious complications including death.  Anticipate prolonged IV antibiotic course.  Patient is not a candidate for outpatient IV antibiotics given recent drug use.    PICC: TBD      Plan of care discussed with MICA Aadn M.D.. Will continue to follow.    Domi Moss M.D.

## 2024-06-01 NOTE — WOUND TEAM
Wound Team consulted for groin wound. Patient went to OR w/ Dr. Alcanatra today for vascular procedure and bilateral groin vac placement. Wound Team to perform vac changes per protocol. Consult complete.

## 2024-06-01 NOTE — PROGRESS NOTES
Vascular surgery    Postop day 1 status post removal of infected femoral to femoral graft  Repair bilateral femoral arteries and sartorius muscle flap    Wound vacs in place  Appropriate drainage  Scrotal edema  No real complaints    Continue IV antibiotic therapy  Initiate wound VAC changes per protocol  Will need to arrange outpatient wound VAC therapy  Increase activity level    Vascular surgery following    Gagandeep Arnett MD

## 2024-06-01 NOTE — PROGRESS NOTES
Bedside report received by RN and patient care assumed. Tele Box in place, patient is A&O4, resting in bed, and on RA. Patient states 2/10 pain, declines intervention. Fall precautions in place and patient educated on use of call light for assistance. Pt updated on POC with no questions or concerns. Hourly monitoring initiated.

## 2024-06-01 NOTE — CARE PLAN
The patient is Stable - Low risk of patient condition declining or worsening    Shift Goals  Clinical Goals: hemodynamic stability, diuresis, ABX administration  Patient Goals: comfort, sleep  Family Goals: STEVE    Progress made toward(s) clinical / shift goals:      Problem: Care Map:  Day 1 Optimal Outcome for the Heart Failure Patient  Goal: Day 1:  Optimal Care of the heart failure patient  Outcome: Progressing  Daily weights, assess edema, monitor I/Os.     Problem: Knowledge Deficit - Standard  Goal: Patient and family/care givers will demonstrate understanding of plan of care, disease process/condition, diagnostic tests and medications  Outcome: Progressing   Pt updated on POC, tests, and medications. Pt verbalizes understanding and has no further questions at this time. Pt educated on calling for any more questions.      Problem: Fall Risk  Goal: Patient will remain free from falls  Outcome: Progressing  Pt remains free from falls at this time. Safety precautions in place. Pt educated on calling for assistance when needed.       Problem: Pain - Standard  Goal: Alleviation of pain or a reduction in pain to the patient’s comfort goal  Outcome: Progressing   Pt is able to verbalize pain on a scale of 1-10 and is able to verbalize comfort goal. Pain management plan followed and pt educated on nonpharmacologic and pharmacological comfort measures.  Pt mediated per MAR.    Patient is not progressing towards the following goals:

## 2024-06-01 NOTE — PROGRESS NOTES
Hospital Medicine Daily Progress Note    Date of Service  6/1/2024    Chief Complaint  Rene Kelley is a 53 y.o. male admitted 5/30/2024 with shortness of breath, groin pain     Hospital Course  This is a 53 y.o. male who presented 5/30/2024 with past medical history of hypertension, polysubstance abuse, morbid obesity who presents to the hospital for chest pain and shortness of breath since he was discharged from the hospital on 5/26.   Patient is also noticed redness and drainage from his groin site and his groin right groin. Patient underwent a emergent endovascular repair of type B aortic dissection with placement of a aortic stent graft. Angioplasty and stent placement of the right external iliac. Angioplasty and stenting of the left common iliac. Left to right femoral-femoral artery bypass using graft. The patient does have extensive smoking and meth use. He still continues to smoke cigarettes.   Vascular surgery consulted     Interval Problem Update  Patient seen and examined, going for surgery today with vascular surgery for his infected graft appreciate rec.  I have consulted also infection disease appreciate rec.  Cont on IV abx   Close monitoring on tele   6/1: Patient seen and examined resting in bed, had surgery for his infected graft yesterday with vascular surgery appreciate rec. ID following cont on IV Vanco and zosyn which dose has been increased   Close monitor on tele for decompensation    I have discussed this patient's plan of care and discharge plan at IDT rounds today with Case Management, Nursing, Nursing leadership, and other members of the IDT team.    Consultants/Specialty  infectious disease and vascular surgery    Code Status  Full Code    Disposition  The patient is not medically cleared for discharge to home or a post-acute facility.      I have placed the appropriate orders for post-discharge needs.    Review of Systems  Review of Systems   Constitutional:  Positive for  chills, fever and malaise/fatigue.   Respiratory:  Positive for cough, shortness of breath and wheezing.    Cardiovascular:  Positive for palpitations, orthopnea, leg swelling and PND.   Gastrointestinal:  Positive for nausea.   Neurological:  Positive for dizziness.        Physical Exam  Temp:  [35.9 °C (96.7 °F)-37 °C (98.6 °F)] 36.7 °C (98.1 °F)  Pulse:  [58-69] 63  Resp:  [16-20] 17  BP: ()/(59-81) 122/74  SpO2:  [90 %-98 %] 97 %    Physical Exam  Vitals and nursing note reviewed.   Constitutional:       General: He is not in acute distress.     Appearance: Normal appearance. He is not ill-appearing, toxic-appearing or diaphoretic.   HENT:      Head: Normocephalic and atraumatic.      Nose: No congestion or rhinorrhea.      Mouth/Throat:      Pharynx: No posterior oropharyngeal erythema.   Eyes:      General: No scleral icterus.        Right eye: No discharge.   Neck:      Vascular: Hepatojugular reflux and JVD present.   Cardiovascular:      Rate and Rhythm: Normal rate and regular rhythm.      Pulses: Normal pulses.      Heart sounds: No murmur heard.     No friction rub. No gallop.   Pulmonary:      Effort: Respiratory distress present.      Breath sounds: No stridor. Rales present. No wheezing or rhonchi.   Abdominal:      General: There is distension.      Tenderness: There is no abdominal tenderness.   Musculoskeletal:         General: No swelling, tenderness, deformity or signs of injury.      Cervical back: Normal range of motion.      Right lower leg: Edema present.      Left lower leg: Edema present.   Skin:     Capillary Refill: Capillary refill takes more than 3 seconds.      Coloration: Skin is not jaundiced or pale.      Findings: Erythema and rash present. No bruising or lesion.      Comments: Scrotal swelling   Neurological:      General: No focal deficit present.      Mental Status: He is alert and oriented to person, place, and time.         Fluids    Intake/Output Summary (Last 24 hours)  at 6/1/2024 1436  Last data filed at 6/1/2024 1148  Gross per 24 hour   Intake 2280 ml   Output 2200 ml   Net 80 ml       Laboratory  Recent Labs     05/30/24  1305 05/31/24  0027 06/01/24  0007   WBC 11.4* 10.2 10.7   RBC 4.21* 4.07* 3.71*   HEMOGLOBIN 13.6* 13.3* 12.2*   HEMATOCRIT 40.3* 39.1* 36.6*   MCV 95.7 96.1 98.7*   MCH 32.3 32.7 32.9   MCHC 33.7 34.0 33.3   RDW 48.9 49.7 51.4*   PLATELETCT 231 204 219   MPV 9.9 9.7 10.2     Recent Labs     05/30/24  1305 05/31/24  0027 06/01/24  0007   SODIUM 132* 132* 134*   POTASSIUM 4.0 3.6 4.2   CHLORIDE 99 100 99   CO2 20 22 24   GLUCOSE 81 104* 100*   BUN 21 23* 23*   CREATININE 1.01 1.09 1.26   CALCIUM 8.2* 7.7* 7.4*     Recent Labs     05/30/24  1305   APTT 30.3   INR 1.25*         Recent Labs     05/31/24  0027   TRIGLYCERIDE 80   HDL 21*   LDL 53       Imaging  DX-CHEST-PORTABLE (1 VIEW)   Final Result      1.  Trace LEFT pleural effusion   2.  Decreased LEFT peripheral pulmonary opacity which could be atelectasis or pneumonia      CT-CTA COMPLETE THORACOABDOMINAL AORTA   Final Result      1.  Type B aortic dissection status post thoracic stent graft. Dissection flap is unchanged extending from just beyond the left subclavian artery origin into the bilateral external iliac arteries.   2.  Proximal descending thoracic aortic aneurysm measuring 4.5 x 4.5 cm.   3.  Status post bilateral common iliac artery stent placement and femorofemoral bypass. Stents and distal femoral arteries appear patent.   4.  Mild stranding and multiple foci of gas in the lower anterior abdominal wall without focal drainable fluid collection just chest abscess.   5.  Redemonstrated extension of the abdominal aortic dissection into the celiac artery which supplied by both the true and false lumen. Distal vessels appear patent.   6.  Diffuse bilateral tree-in-bud opacities which could be seen in setting edema and/or infection.   7.  15 mm lingular pulmonary nodule.   8.  Trace left pleural  effusion.   9.  Nonobstructive bilateral renal calculi.      Otilio Type B aortic dissection      Fleischner Society pulmonary nodule recommendations:   Low and High Risk: Consider CT at 3 months, PET/CT, or tissue sampling.      Low Risk - Minimal or absent history of smoking and of other known risk factors.      High Risk - History of smoking or of other known risk factors.      Note: These recommendations do not apply to lung cancer screening, patients with immunosuppression, or patients with known primary cancer.      Fleischner Society 2017 Guidelines for Management of Incidentally Detected Pulmonary Nodules in Adults               EC-ECHOCARDIOGRAM COMPLETE W/O CONT    (Results Pending)   IR-ABDOMINAL AORTA-WITH RUNOFF    (Results Pending)        Assessment/Plan  * Wound infection- (present on admission)  Assessment & Plan  Pustular drainage from his groin wound  Staples removed in the ER  Pain control  Now on vanco and zosyn   Follow-up on wound cultures and blood cultures  Vascular surgery following and plan to get patient to OR  I have also consulted Infection disease     Acute diastolic heart failure (HCC)  Assessment & Plan  Presents decompensated  IV Lasix 40 twice daily  Strict ins and outs and daily weights  Monitor on telemetry      Acute respiratory failure with hypoxia (HCC)  Assessment & Plan  On 3 L of O2 above baseline    Tobacco abuse- (present on admission)  Assessment & Plan  Tobacco cessation education provided for more than 11 minutes.  Explained to the patient that he has COPD and peripheral vascular disease.  He recently had surgery that would be worse with smoking cigarettes.  We discussed the risks of smoking including increased risk of heart disease, stroke, cancer and COPD. We discussed the benefits of quitting smoking. We discussed options of nicotine patch, wellbutrin and chantix.  The patient has the intention to quit smoking. Nicotine replacement protocol will be provided to the  patient.      Acquired hypothyroidism- (present on admission)  Assessment & Plan  Continue levothyroxine    Dissection of aorta (HCC)- (present on admission)  Assessment & Plan  status post emergent endovascular repair of descending thoracic aortic type B dissection with placement of thoracic stent graft  Patient also underwent iliac stenting and femoral to femoral artery bypass  Pain control  Monitor blood pressure  He has multiple stents and graft placement.  I will ask vascular surgery if he needs to be on aspirin or Plavix    Hypertension- (present on admission)  Assessment & Plan  Uncontrolled  Continue home blood pressure medications    Greater than 51 minutes spent prepping to see patient (e.g. review of tests) obtaining and/or reviewing separately obtained history. Performing a medically appropriate examination and/ evaluation.  Counseling and educating the patient/family/caregiver.  Ordering medications, tests, or procedures.  Referring and communicating with other health care professionals.  Documenting clinical information in EPIC.  Independently interpreting results and communicating results to patient/family/caregiver.  Care coordination.         VTE prophylaxis: scd     I have performed a physical exam and reviewed and updated ROS and Plan today (6/1/2024). In review of yesterday's note (5/31/2024), there are no changes except as documented above.

## 2024-06-01 NOTE — THERAPY
"Physical Therapy   Initial Evaluation     Patient Name: Rene Kelley  Age:  53 y.o., Sex:  male  Medical Record #: [id number]  Today's Date: 6/1/2024     Precautions  Precautions: Fall Risk  Comments: wound vac    Assessment  Patient is 53 y.o. male admitted with SOB and groin pain. PMH includes HTN, polysubstance abuse, morbid obesity, and recent admission discharged 5/26 where he underwent an emergent endovascular repair of descending thoracic aortic type B dissection with placement of thoracic stent graft. This admission, pt was found to have a wound infection at his vascular graft site. He is now POD #1 removal of femoral bypass graft, angiogram with stent placement, right sartorius muscle graft, and wound vac placement. Pt most limited by scrotal edema at this time. He was able to ambulate around his room with IV pole and SPV. He reports no concerns with stair negotiation, he just \"takes it slow.\" Patient will not be actively followed for physical therapy services at this time, however may be seen if requested by physician for 1 more visit within 30 days to address any discharge or equipment needs.    Plan    Physical Therapy Initial Treatment Plan   Duration: Discharge Needs Only    DC Equipment Recommendations: None  Discharge Recommendations: Anticipate that the patient will have no further physical therapy needs after discharge from the hospital          06/01/24 1345   Vitals   O2 (LPM) 1   O2 Delivery Device Silicone Nasal Cannula   Pain 0 - 10 Group   Therapist Pain Assessment During Activity;Nurse Notified  (scrotal pain)   Non Verbal Descriptors   Non Verbal Scale  Calm   Prior Living Situation   Prior Services Home-Independent   Housing / Facility Mobile Home   Steps Into Home 4   Steps In Home 0   Rail Both Rail (Steps into Home)   Bathroom Set up Walk In Shower   Equipment Owned Front-Wheel Walker   Lives with - Patient's Self Care Capacity Friends   Comments pt lives with roommates but he " doesnt rely on them for assistance   Prior Level of Functional Mobility   Bed Mobility Independent   Transfer Status Independent   Ambulation Independent   Ambulation Distance household   Assistive Devices Used Front-Wheel Walker   Stairs Independent   Comments pt has been primarily house bound since last admission, he was using the FWW in community   Cognition    Level of Consciousness Alert   Strength Upper Body   Upper Body Strength  WDL   Active ROM Lower Body    Active ROM Lower Body  X   Comments R hip flexion limited by pain   Strength Lower Body   Lower Body Strength  X   Comments R LE generally limited by pain   Sensation Lower Body   Lower Extremity Sensation   WDL   Coordination Lower Body    Coordination Lower Body  WDL   Other Treatments   Other Treatments Provided educated on increasing mobility with RN staff   Balance Assessment   Sitting Balance (Static) Fair +   Sitting Balance (Dynamic) Fair +   Standing Balance (Static) Fair +   Standing Balance (Dynamic) Fair +   Weight Shift Sitting Fair   Weight Shift Standing Fair   Comments IV pole   Bed Mobility    Comments received EOB with CNA   Gait Analysis   Gait Level Of Assist Supervised   Assistive Device Other (Comments)  (IV pole)   Distance (Feet) 25   # of Times Distance was Traveled 1   Deviation Antalgic;Increased Base Of Support   Weight Bearing Status no restrictions   Comments pt able to ambulate around hospital room using IV pole, he has a FWW at home he can use if needed   Functional Mobility   Sit to Stand Supervised   Bed, Chair, Wheelchair Transfer Supervised   Transfer Method Stand Step   Mobility in room with IV pole   Edema / Skin Assessment   Edema / Skin  Not Assessed   Comments B LE edema   Education Group   Education Provided Role of Physical Therapist   Role of Physical Therapist Patient Response Patient;Acceptance;Explanation;Verbal Demonstration   Physical Therapy Initial Treatment Plan    Duration Discharge Needs Only    Anticipated Discharge Equipment and Recommendations   DC Equipment Recommendations None   Discharge Recommendations Anticipate that the patient will have no further physical therapy needs after discharge from the hospital

## 2024-06-02 ENCOUNTER — APPOINTMENT (OUTPATIENT)
Dept: CARDIOLOGY | Facility: MEDICAL CENTER | Age: 54
DRG: 252 | End: 2024-06-02
Attending: HOSPITALIST
Payer: COMMERCIAL

## 2024-06-02 LAB
LV EJECT FRACT  99904: 55
VANCOMYCIN PEAK SERPL-MCNC: 31.8 UG/ML (ref 20–40)
VANCOMYCIN TROUGH SERPL-MCNC: 22.2 UG/ML (ref 10–20)

## 2024-06-02 PROCEDURE — 97165 OT EVAL LOW COMPLEX 30 MIN: CPT

## 2024-06-02 PROCEDURE — 93306 TTE W/DOPPLER COMPLETE: CPT

## 2024-06-02 PROCEDURE — 700117 HCHG RX CONTRAST REV CODE 255: Performed by: HOSPITALIST

## 2024-06-02 PROCEDURE — 700102 HCHG RX REV CODE 250 W/ 637 OVERRIDE(OP): Performed by: HOSPITALIST

## 2024-06-02 PROCEDURE — 700111 HCHG RX REV CODE 636 W/ 250 OVERRIDE (IP): Performed by: SURGERY

## 2024-06-02 PROCEDURE — 700105 HCHG RX REV CODE 258: Performed by: INTERNAL MEDICINE

## 2024-06-02 PROCEDURE — 700111 HCHG RX REV CODE 636 W/ 250 OVERRIDE (IP): Performed by: HOSPITALIST

## 2024-06-02 PROCEDURE — A9270 NON-COVERED ITEM OR SERVICE: HCPCS | Performed by: HOSPITALIST

## 2024-06-02 PROCEDURE — 700111 HCHG RX REV CODE 636 W/ 250 OVERRIDE (IP): Mod: JZ | Performed by: INTERNAL MEDICINE

## 2024-06-02 PROCEDURE — 80202 ASSAY OF VANCOMYCIN: CPT

## 2024-06-02 PROCEDURE — 99233 SBSQ HOSP IP/OBS HIGH 50: CPT | Performed by: INTERNAL MEDICINE

## 2024-06-02 PROCEDURE — 36415 COLL VENOUS BLD VENIPUNCTURE: CPT

## 2024-06-02 PROCEDURE — 700105 HCHG RX REV CODE 258: Performed by: HOSPITALIST

## 2024-06-02 PROCEDURE — A9270 NON-COVERED ITEM OR SERVICE: HCPCS | Performed by: SURGERY

## 2024-06-02 PROCEDURE — 700102 HCHG RX REV CODE 250 W/ 637 OVERRIDE(OP): Performed by: SURGERY

## 2024-06-02 PROCEDURE — 93306 TTE W/DOPPLER COMPLETE: CPT | Mod: 26 | Performed by: INTERNAL MEDICINE

## 2024-06-02 PROCEDURE — 700111 HCHG RX REV CODE 636 W/ 250 OVERRIDE (IP): Performed by: INTERNAL MEDICINE

## 2024-06-02 PROCEDURE — 770020 HCHG ROOM/CARE - TELE (206)

## 2024-06-02 RX ADMIN — ACETAMINOPHEN 1000 MG: 500 TABLET, FILM COATED ORAL at 18:36

## 2024-06-02 RX ADMIN — FLUTICASONE FUROATE, UMECLIDINIUM BROMIDE AND VILANTEROL TRIFENATATE 1 PUFF: 100; 62.5; 25 POWDER RESPIRATORY (INHALATION) at 06:35

## 2024-06-02 RX ADMIN — GABAPENTIN 100 MG: 100 CAPSULE ORAL at 17:38

## 2024-06-02 RX ADMIN — KETOROLAC TROMETHAMINE 15 MG: 15 INJECTION, SOLUTION INTRAMUSCULAR; INTRAVENOUS at 00:30

## 2024-06-02 RX ADMIN — KETOROLAC TROMETHAMINE 15 MG: 15 INJECTION, SOLUTION INTRAMUSCULAR; INTRAVENOUS at 17:38

## 2024-06-02 RX ADMIN — KETOROLAC TROMETHAMINE 15 MG: 15 INJECTION, SOLUTION INTRAMUSCULAR; INTRAVENOUS at 05:24

## 2024-06-02 RX ADMIN — VANCOMYCIN HYDROCHLORIDE 1250 MG: 5 INJECTION, POWDER, LYOPHILIZED, FOR SOLUTION INTRAVENOUS at 18:38

## 2024-06-02 RX ADMIN — ACETAMINOPHEN 1000 MG: 500 TABLET, FILM COATED ORAL at 13:48

## 2024-06-02 RX ADMIN — LEVOTHYROXINE SODIUM 100 MCG: 0.1 TABLET ORAL at 05:25

## 2024-06-02 RX ADMIN — GABAPENTIN 100 MG: 100 CAPSULE ORAL at 11:57

## 2024-06-02 RX ADMIN — HEPARIN SODIUM 5000 UNITS: 5000 INJECTION, SOLUTION INTRAVENOUS; SUBCUTANEOUS at 05:24

## 2024-06-02 RX ADMIN — ACETAMINOPHEN 1000 MG: 500 TABLET, FILM COATED ORAL at 23:57

## 2024-06-02 RX ADMIN — KETOROLAC TROMETHAMINE 15 MG: 15 INJECTION, SOLUTION INTRAMUSCULAR; INTRAVENOUS at 11:57

## 2024-06-02 RX ADMIN — NICOTINE TRANSDERMAL SYSTEM 21 MG: 21 PATCH, EXTENDED RELEASE TRANSDERMAL at 05:25

## 2024-06-02 RX ADMIN — FAMOTIDINE 20 MG: 20 TABLET, FILM COATED ORAL at 05:24

## 2024-06-02 RX ADMIN — FUROSEMIDE 40 MG: 10 INJECTION INTRAMUSCULAR; INTRAVENOUS at 05:24

## 2024-06-02 RX ADMIN — AMLODIPINE BESYLATE 10 MG: 10 TABLET ORAL at 05:24

## 2024-06-02 RX ADMIN — HEPARIN SODIUM 5000 UNITS: 5000 INJECTION, SOLUTION INTRAVENOUS; SUBCUTANEOUS at 15:14

## 2024-06-02 RX ADMIN — VANCOMYCIN HYDROCHLORIDE 1750 MG: 5 INJECTION, POWDER, LYOPHILIZED, FOR SOLUTION INTRAVENOUS at 03:00

## 2024-06-02 RX ADMIN — GABAPENTIN 100 MG: 100 CAPSULE ORAL at 05:25

## 2024-06-02 RX ADMIN — FUROSEMIDE 40 MG: 10 INJECTION INTRAMUSCULAR; INTRAVENOUS at 15:14

## 2024-06-02 RX ADMIN — HUMAN ALBUMIN MICROSPHERES AND PERFLUTREN 3 ML: 10; .22 INJECTION, SOLUTION INTRAVENOUS at 15:30

## 2024-06-02 RX ADMIN — PIPERACILLIN AND TAZOBACTAM 4.5 G: 4; .5 INJECTION, POWDER, FOR SOLUTION INTRAVENOUS at 13:51

## 2024-06-02 RX ADMIN — CARVEDILOL 12.5 MG: 12.5 TABLET, FILM COATED ORAL at 17:38

## 2024-06-02 RX ADMIN — CARVEDILOL 12.5 MG: 12.5 TABLET, FILM COATED ORAL at 07:56

## 2024-06-02 RX ADMIN — PIPERACILLIN AND TAZOBACTAM 4.5 G: 4; .5 INJECTION, POWDER, FOR SOLUTION INTRAVENOUS at 06:17

## 2024-06-02 RX ADMIN — FUROSEMIDE 40 MG: 10 INJECTION INTRAMUSCULAR; INTRAVENOUS at 23:57

## 2024-06-02 RX ADMIN — HEPARIN SODIUM 5000 UNITS: 5000 INJECTION, SOLUTION INTRAVENOUS; SUBCUTANEOUS at 23:57

## 2024-06-02 RX ADMIN — METHOCARBAMOL TABLETS 500 MG: 500 TABLET, COATED ORAL at 07:56

## 2024-06-02 RX ADMIN — ACETAMINOPHEN 1000 MG: 500 TABLET, FILM COATED ORAL at 00:30

## 2024-06-02 RX ADMIN — KETOROLAC TROMETHAMINE 15 MG: 15 INJECTION, SOLUTION INTRAMUSCULAR; INTRAVENOUS at 23:57

## 2024-06-02 RX ADMIN — ACETAMINOPHEN 1000 MG: 500 TABLET, FILM COATED ORAL at 05:24

## 2024-06-02 RX ADMIN — METHOCARBAMOL TABLETS 500 MG: 500 TABLET, COATED ORAL at 13:48

## 2024-06-02 RX ADMIN — METHOCARBAMOL TABLETS 500 MG: 500 TABLET, COATED ORAL at 23:57

## 2024-06-02 RX ADMIN — METHOCARBAMOL TABLETS 500 MG: 500 TABLET, COATED ORAL at 17:38

## 2024-06-02 ASSESSMENT — ENCOUNTER SYMPTOMS
SHORTNESS OF BREATH: 1
ORTHOPNEA: 1
PND: 1
DIZZINESS: 1
FEVER: 1
COUGH: 1
WHEEZING: 1
NAUSEA: 1
PALPITATIONS: 1
CHILLS: 1

## 2024-06-02 ASSESSMENT — COGNITIVE AND FUNCTIONAL STATUS - GENERAL
SUGGESTED CMS G CODE MODIFIER DAILY ACTIVITY: CJ
DRESSING REGULAR LOWER BODY CLOTHING: A LITTLE
DAILY ACTIVITIY SCORE: 21
TOILETING: A LITTLE
HELP NEEDED FOR BATHING: A LITTLE

## 2024-06-02 ASSESSMENT — PAIN DESCRIPTION - PAIN TYPE
TYPE: ACUTE PAIN

## 2024-06-02 ASSESSMENT — FIBROSIS 4 INDEX: FIB4 SCORE: 1.35

## 2024-06-02 ASSESSMENT — ACTIVITIES OF DAILY LIVING (ADL): TOILETING: INDEPENDENT

## 2024-06-02 NOTE — CARE PLAN
The patient is Stable - Low risk of patient condition declining or worsening    Shift Goals  Clinical Goals: hemodynamic stability, wound vac management, ABX administration  Patient Goals: comfort, sleep  Family Goals: STEVE    Progress made toward(s) clinical / shift goals:      Problem: Care Map:  Day 3 Optimal Outcome for the Heart Failure Patient  Goal: Day 3:  Optimal Care of the heart failure patient  Outcome: Progressing     Problem: Knowledge Deficit - Standard  Goal: Patient and family/care givers will demonstrate understanding of plan of care, disease process/condition, diagnostic tests and medications  Outcome: Progressing     Problem: Fall Risk  Goal: Patient will remain free from falls  Outcome: Progressing       Patient is not progressing towards the following goals:

## 2024-06-02 NOTE — PROGRESS NOTES
Pharmacy Vancomycin Kinetics Note for 6/2/2024     53 y.o. male on Vancomycin day # 4     Vancomycin Indication (Two level): Skin/skin structure infection (goal trough 10-15)    Provider specified end date: 06/06/24    Active Antibiotics (From admission, onward)      Ordered     Ordering Provider       Sat Jun 1, 2024 10:37 AM    06/01/24 1037  piperacillin-tazobactam (Zosyn) 4.5 g in  mL IVPB  (piperacillin-tazobactam (ZOSYN) IV (Extended-infusion) PANEL )  EVERY 8 HOURS         Domi Moss M.D.       Thu May 30, 2024  7:47 PM    05/30/24 1947  vancomycin (Vancocin) 1,750 mg in  mL IVPB  (vancomycin (VANCOCIN) IV (LD + Maintenance))  EVERY 12 HOURS        Note to Pharmacy: Per P&T vanco per pharmacy Protocol    Jonas Wall M.D.       Thu May 30, 2024  6:15 PM    05/30/24 1815  MD Alert...Vancomycin per Pharmacy  PHARMACY TO DOSE        Question:  Indication(s) for vancomycin?  Answer:  Skin and soft tissue infection    Jonas Wall M.D.            Dosing Weight: 145 kg (319 lb 10.7 oz)      Admission History: Admitted on 5/30/2024 for Wound infection [T14.8XXA, L08.9]  Pertinent history: Patient presenting with groin pain and supra pubic surgical site infection. Patient was recently discharged after Type B aortic dissection repair with right femoral-femoral artery bypass graft. Patient has extensive substance abuse history including meth. WBC noted to be elevated at 11.4. MD notes pustular drainage from groin wound. Empiric zosyn and vanco therapy initiated.    Allergies:     Patient has no known allergies.     Pertinent cultures to date:     Results       Procedure Component Value Units Date/Time    CULTURE WOUND W/ GRAM STAIN [[id number]]  (Abnormal) Collected: 05/30/24 1635    Order Status: Completed Specimen: Wound from Exudate Updated: 06/02/24 1346     Significant Indicator POS     Source WND     Site LLQ Abdomen     Culture Result Light growth usual skin eliza.     Gram Stain Result  Many WBCs.  No organisms seen.       Culture Result Corynebacterium amycolatum  Moderate growth      Urine Culture (New) [[id number]] Collected: 05/30/24 2150    Order Status: Completed Specimen: Urine Updated: 06/01/24 2008     Significant Indicator NEG     Source UR     Site -     Culture Result No growth at 48 hours.    Narrative:      Indication for culture:->Emergency Room Patient    Blood Culture - Draw one from central line and one from peripheral site [[id number]] Collected: 05/30/24 1529    Order Status: Completed Specimen: Blood from Line Updated: 05/31/24 0644     Significant Indicator NEG     Source BLD     Site Peripheral     Culture Result No Growth  Note: Blood cultures are incubated for 5 days and  are monitored continuously.Positive blood cultures  are called to the RN and reported as soon as  they are identified.      Narrative:      Right Hand    Blood Culture - Draw one from central line and one from peripheral site [[id number]] Collected: 05/30/24 1517    Order Status: Completed Specimen: Blood from Peripheral Updated: 05/31/24 0644     Significant Indicator NEG     Source BLD     Site PERIPHERAL     Culture Result No Growth  Note: Blood cultures are incubated for 5 days and  are monitored continuously.Positive blood cultures  are called to the RN and reported as soon as  they are identified.      Narrative:      Right Forearm/Arm    MRSA By PCR (Amp) [[id number]] Collected: 05/30/24 2150    Order Status: Completed Specimen: Respirate from Nares Updated: 05/30/24 2358     MRSA by PCR Negative    CoV-2, Flu A/B, And RSV by PCR (Cepheid) [[id number]] Collected: 05/30/24 2150    Order Status: Completed Specimen: Respirate Updated: 05/30/24 2303     Influenza virus A RNA Negative     Influenza virus B, PCR Negative     RSV, PCR Negative     SARS-CoV-2 by PCR NotDetected     Comment: [facility] providers: PLEASE REFER TO DE-ESCALATION AND RETESTING PROTOCOL  on [email or web address]    **The [email or web address]  "Xpress SARS-CoV-2 RT-PCR Test has been made  available for use under the Emergency Use Authorization (EUA) only.          SARS-CoV-2 Source NP Swab    GRAM STAIN [[id number]] Collected: 24 1635    Order Status: Completed Specimen: Wound Updated: 24     Significant Indicator .     Source WND     Site LLQ Abdomen     Gram Stain Result Many WBCs.  No organisms seen.      Urinalysis [[id number]]  (Abnormal) Collected: 24    Order Status: Completed Specimen: Urine Updated: 24     Color Yellow     Character Clear     Specific Gravity 1.023     Ph 5.5     Glucose Negative mg/dL      Ketones Negative mg/dL      Protein Negative mg/dL      Bilirubin Negative     Urobilinogen, Urine 1.0     Nitrite Negative     Leukocyte Esterase Negative     Occult Blood Small     Micro Urine Req Microscopic            Labs:     Estimated Creatinine Clearance: 101.9 mL/min (by C-G formula based on SCr of 1.26 mg/dL).  Recent Labs     24  0027 24  0007   WBC 10.2 10.7   NEUTSPOLYS 73.80*  --      Recent Labs     24  0027 24  0007   BUN 23* 23*   CREATININE 1.09 1.26   ALBUMIN 2.5*  --        Intake/Output Summary (Last 24 hours) at 2024 1640  Last data filed at 2024 1600  Gross per 24 hour   Intake 1360 ml   Output 2850 ml   Net -1490 ml      BP (!) 145/74   Pulse 64   Temp 36.7 °C (98.1 °F) (Temporal)   Resp 17   Ht 1.778 m (5' 10\")   Wt (!) 153 kg (337 lb 15.4 oz)   SpO2 96%  Temp (24hrs), Av.7 °C (98.1 °F), Min:36.6 °C (97.9 °F), Max:36.9 °C (98.4 °F)      List concerns for Vancomycin clearance:     Obesity;Nephrotoxic drugs    Pharmacokinetics:     Two level kinetics:   Ke (hr ^-1): 0.0527  Half life: 13.2  Vd: Steady state Vd : 97.331  Calculated AUC: 682 mg·hr/L    Trough kinetics:   Recent Labs     24  0734 24  1423   VANCOTROUGH  --  22.2*   CIRO 31.8  --        A/P:     Vancomycin levels obtained at steady state and resulted in a " supra-therapeutic AUC = 682 (Goal -600)  Plan to reduce the dose from 1750 mg every 12 hours to 1250 mg every 12 hours. Predicted vancomycin AUC (with reduced dose) from two level test calculator: 487 mg·hr/L.  Repeat level in 3-5 days unless otherwise indicated earlier.   Wound cx is growing corynebacterium amycolatum. ID following.     Miguelina Dos Santos, AlfredoD

## 2024-06-02 NOTE — PROGRESS NOTES
Hospital Medicine Daily Progress Note    Date of Service  6/2/2024    Chief Complaint  Rene Kelley is a 53 y.o. male admitted 5/30/2024 with shortness of breath, groin pain     Hospital Course  This is a 53 y.o. male who presented 5/30/2024 with past medical history of hypertension, polysubstance abuse, morbid obesity who presents to the hospital for chest pain and shortness of breath since he was discharged from the hospital on 5/26.   Patient is also noticed redness and drainage from his groin site and his groin right groin. Patient underwent a emergent endovascular repair of type B aortic dissection with placement of a aortic stent graft. Angioplasty and stent placement of the right external iliac. Angioplasty and stenting of the left common iliac. Left to right femoral-femoral artery bypass using graft. The patient does have extensive smoking and meth use. He still continues to smoke cigarettes.   Vascular surgery consulted     Interval Problem Update  Patient seen and examined, going for surgery today with vascular surgery for his infected graft appreciate rec.  I have consulted also infection disease appreciate rec.  Cont on IV abx   Close monitoring on tele   6/1: Patient seen and examined resting in bed, had surgery for his infected graft yesterday with vascular surgery appreciate rec. ID following cont on IV Vanco and zosyn which dose has been increased   Close monitor on tele for decompensation  6/2: Patient seen and examined still with some pain , on wound vac appreciate vascular surgery rec.  ID following abx as per ID rec.    I have discussed this patient's plan of care and discharge plan at IDT rounds today with Case Management, Nursing, Nursing leadership, and other members of the IDT team.    Consultants/Specialty  infectious disease and vascular surgery    Code Status  Full Code    Disposition  The patient is not medically cleared for discharge to home or a post-acute facility.      I have  placed the appropriate orders for post-discharge needs.    Review of Systems  Review of Systems   Constitutional:  Positive for chills, fever and malaise/fatigue.   Respiratory:  Positive for cough, shortness of breath and wheezing.    Cardiovascular:  Positive for palpitations, orthopnea, leg swelling and PND.   Gastrointestinal:  Positive for nausea.   Neurological:  Positive for dizziness.        Physical Exam  Temp:  [36.6 °C (97.9 °F)-36.9 °C (98.4 °F)] 36.6 °C (97.9 °F)  Pulse:  [63-71] 67  Resp:  [14-20] 18  BP: (120-167)/(56-84) 167/84  SpO2:  [92 %-97 %] 97 %    Physical Exam  Vitals and nursing note reviewed.   Constitutional:       General: He is not in acute distress.     Appearance: Normal appearance. He is not ill-appearing, toxic-appearing or diaphoretic.   HENT:      Head: Normocephalic and atraumatic.      Nose: No congestion or rhinorrhea.      Mouth/Throat:      Pharynx: No posterior oropharyngeal erythema.   Eyes:      General: No scleral icterus.        Right eye: No discharge.   Neck:      Vascular: Hepatojugular reflux and JVD present.   Cardiovascular:      Rate and Rhythm: Normal rate and regular rhythm.      Pulses: Normal pulses.      Heart sounds: No murmur heard.     No friction rub. No gallop.   Pulmonary:      Effort: Respiratory distress present.      Breath sounds: No stridor. Rales present. No wheezing or rhonchi.   Abdominal:      General: There is distension.      Tenderness: There is no abdominal tenderness.   Musculoskeletal:         General: No swelling, tenderness, deformity or signs of injury.      Cervical back: Normal range of motion.      Right lower leg: Edema present.      Left lower leg: Edema present.   Skin:     Capillary Refill: Capillary refill takes more than 3 seconds.      Coloration: Skin is not jaundiced or pale.      Findings: Erythema and rash present. No bruising or lesion.      Comments: Scrotal swelling   Neurological:      General: No focal deficit  present.      Mental Status: He is alert and oriented to person, place, and time.         Fluids    Intake/Output Summary (Last 24 hours) at 6/2/2024 1408  Last data filed at 6/2/2024 0900  Gross per 24 hour   Intake 1000 ml   Output 2850 ml   Net -1850 ml       Laboratory  Recent Labs     05/31/24  0027 06/01/24  0007   WBC 10.2 10.7   RBC 4.07* 3.71*   HEMOGLOBIN 13.3* 12.2*   HEMATOCRIT 39.1* 36.6*   MCV 96.1 98.7*   MCH 32.7 32.9   MCHC 34.0 33.3   RDW 49.7 51.4*   PLATELETCT 204 219   MPV 9.7 10.2     Recent Labs     05/31/24  0027 06/01/24  0007   SODIUM 132* 134*   POTASSIUM 3.6 4.2   CHLORIDE 100 99   CO2 22 24   GLUCOSE 104* 100*   BUN 23* 23*   CREATININE 1.09 1.26   CALCIUM 7.7* 7.4*               Recent Labs     05/31/24  0027   TRIGLYCERIDE 80   HDL 21*   LDL 53       Imaging  EC-ECHOCARDIOGRAM COMPLETE W/O CONT         EC-ECHOCARDIOGRAM COMPLETE W/ CONT   Final Result      DX-CHEST-PORTABLE (1 VIEW)   Final Result      1.  Trace LEFT pleural effusion   2.  Decreased LEFT peripheral pulmonary opacity which could be atelectasis or pneumonia      CT-CTA COMPLETE THORACOABDOMINAL AORTA   Final Result      1.  Type B aortic dissection status post thoracic stent graft. Dissection flap is unchanged extending from just beyond the left subclavian artery origin into the bilateral external iliac arteries.   2.  Proximal descending thoracic aortic aneurysm measuring 4.5 x 4.5 cm.   3.  Status post bilateral common iliac artery stent placement and femorofemoral bypass. Stents and distal femoral arteries appear patent.   4.  Mild stranding and multiple foci of gas in the lower anterior abdominal wall without focal drainable fluid collection just chest abscess.   5.  Redemonstrated extension of the abdominal aortic dissection into the celiac artery which supplied by both the true and false lumen. Distal vessels appear patent.   6.  Diffuse bilateral tree-in-bud opacities which could be seen in setting edema and/or  infection.   7.  15 mm lingular pulmonary nodule.   8.  Trace left pleural effusion.   9.  Nonobstructive bilateral renal calculi.      [place] Type B aortic dissection      Fleischner Society pulmonary nodule recommendations:   Low and High Risk: Consider CT at 3 months, PET/CT, or tissue sampling.      Low Risk - Minimal or absent history of smoking and of other known risk factors.      High Risk - History of smoking or of other known risk factors.      Note: These recommendations do not apply to lung cancer screening, patients with immunosuppression, or patients with known primary cancer.      Fleischner Society 2017 Guidelines for Management of Incidentally Detected Pulmonary Nodules in Adults               IR-ABDOMINAL AORTA-WITH RUNOFF    (Results Pending)        Assessment/Plan  * Wound infection- (present on admission)  Assessment & Plan  Pustular drainage from his groin wound  Staples removed in the ER  Pain control  Now on vanco and zosyn   Follow-up on wound cultures and blood cultures  Vascular surgery following and plan to get patient to OR  I have also consulted Infection disease     Acute diastolic heart failure (HCC)  Assessment & Plan  Presents decompensated  IV Lasix 40 twice daily  Strict ins and outs and daily weights  Monitor on telemetry      Acute respiratory failure with hypoxia (HCC)  Assessment & Plan  On 3 L of O2 above baseline    Tobacco abuse- (present on admission)  Assessment & Plan  Tobacco cessation education provided for more than 11 minutes.  Explained to the patient that he has COPD and peripheral vascular disease.  He recently had surgery that would be worse with smoking cigarettes.  We discussed the risks of smoking including increased risk of heart disease, stroke, cancer and COPD. We discussed the benefits of quitting smoking. We discussed options of nicotine patch, wellbutrin and chantix.  The patient has the intention to quit smoking. Nicotine replacement protocol will be  provided to the patient.      Acquired hypothyroidism- (present on admission)  Assessment & Plan  Continue levothyroxine    Dissection of aorta (HCC)- (present on admission)  Assessment & Plan  status post emergent endovascular repair of descending thoracic aortic type B dissection with placement of thoracic stent graft  Patient also underwent iliac stenting and femoral to femoral artery bypass  Pain control  Monitor blood pressure  He has multiple stents and graft placement.  I will ask vascular surgery if he needs to be on aspirin or Plavix    Hypertension- (present on admission)  Assessment & Plan  Uncontrolled  Continue home blood pressure medications      VTE prophylaxis: scd     Greater than 51 minutes spent prepping to see patient (e.g. review of tests) obtaining and/or reviewing separately obtained history. Performing a medically appropriate examination and/ evaluation.  Counseling and educating the patient/family/caregiver.  Ordering medications, tests, or procedures.  Referring and communicating with other health care professionals.  Documenting clinical information in EPIC.  Independently interpreting results and communicating results to patient/family/caregiver.  Care coordination.     I have performed a physical exam and reviewed and updated ROS and Plan today (6/2/2024). In review of yesterday's note (6/1/2024), there are no changes except as documented above.

## 2024-06-02 NOTE — PROGRESS NOTES
Monitor summary:        Rhythm: SR   Rate: 65-70  Ectopy: (r)PVC, (r)COUP, (r)trip, (r)PAC  Measurements: 14./.09/.44        12hr chart check

## 2024-06-02 NOTE — PROGRESS NOTES
Vascular surgery    Patient continues to improve and convalesce  Wound vacs in place  Scrotal edema    Will assess the patient's tolerance for wound VAC changes  Okay to disposition after patient tolerates wound VAC changes    Vascular surgery following    Gagandeep Arnett MD

## 2024-06-02 NOTE — PROGRESS NOTES
Zosyn infused at incorrect rate at 2120. Pt VS stable and showing no signs of adverse reaction at this time. Pharmacy and FÉLIX Alexander notified. APRN expressed understanding, no new orders at this time.

## 2024-06-03 LAB
ANION GAP SERPL CALC-SCNC: 10 MMOL/L (ref 7–16)
BASOPHILS # BLD AUTO: 1.1 % (ref 0–1.8)
BASOPHILS # BLD: 0.08 K/UL (ref 0–0.12)
BUN SERPL-MCNC: 19 MG/DL (ref 8–22)
CALCIUM SERPL-MCNC: 8.2 MG/DL (ref 8.5–10.5)
CHLORIDE SERPL-SCNC: 103 MMOL/L (ref 96–112)
CO2 SERPL-SCNC: 24 MMOL/L (ref 20–33)
CREAT SERPL-MCNC: 1.33 MG/DL (ref 0.5–1.4)
EOSINOPHIL # BLD AUTO: 0.38 K/UL (ref 0–0.51)
EOSINOPHIL NFR BLD: 5.2 % (ref 0–6.9)
ERYTHROCYTE [DISTWIDTH] IN BLOOD BY AUTOMATED COUNT: 50.6 FL (ref 35.9–50)
GFR SERPLBLD CREATININE-BSD FMLA CKD-EPI: 64 ML/MIN/1.73 M 2
GLUCOSE SERPL-MCNC: 94 MG/DL (ref 65–99)
HCT VFR BLD AUTO: 38.9 % (ref 42–52)
HGB BLD-MCNC: 12.9 G/DL (ref 14–18)
IMM GRANULOCYTES # BLD AUTO: 0.12 K/UL (ref 0–0.11)
IMM GRANULOCYTES NFR BLD AUTO: 1.6 % (ref 0–0.9)
LYMPHOCYTES # BLD AUTO: 0.92 K/UL (ref 1–4.8)
LYMPHOCYTES NFR BLD: 12.6 % (ref 22–41)
MAGNESIUM SERPL-MCNC: 1.9 MG/DL (ref 1.5–2.5)
MCH RBC QN AUTO: 32.6 PG (ref 27–33)
MCHC RBC AUTO-ENTMCNC: 33.2 G/DL (ref 32.3–36.5)
MCV RBC AUTO: 98.2 FL (ref 81.4–97.8)
MONOCYTES # BLD AUTO: 0.71 K/UL (ref 0–0.85)
MONOCYTES NFR BLD AUTO: 9.7 % (ref 0–13.4)
NEUTROPHILS # BLD AUTO: 5.09 K/UL (ref 1.82–7.42)
NEUTROPHILS NFR BLD: 69.8 % (ref 44–72)
NRBC # BLD AUTO: 0 K/UL
NRBC BLD-RTO: 0 /100 WBC (ref 0–0.2)
PHOSPHATE SERPL-MCNC: 3.1 MG/DL (ref 2.5–4.5)
PLATELET # BLD AUTO: 284 K/UL (ref 164–446)
PMV BLD AUTO: 9.6 FL (ref 9–12.9)
POTASSIUM SERPL-SCNC: 3.8 MMOL/L (ref 3.6–5.5)
RBC # BLD AUTO: 3.96 M/UL (ref 4.7–6.1)
SODIUM SERPL-SCNC: 137 MMOL/L (ref 135–145)
WBC # BLD AUTO: 7.3 K/UL (ref 4.8–10.8)

## 2024-06-03 PROCEDURE — 700111 HCHG RX REV CODE 636 W/ 250 OVERRIDE (IP): Mod: JZ | Performed by: HOSPITALIST

## 2024-06-03 PROCEDURE — 700102 HCHG RX REV CODE 250 W/ 637 OVERRIDE(OP): Performed by: HOSPITALIST

## 2024-06-03 PROCEDURE — 97607 NEG PRS WND THR NDME<=50SQCM: CPT

## 2024-06-03 PROCEDURE — A9270 NON-COVERED ITEM OR SERVICE: HCPCS | Performed by: HOSPITALIST

## 2024-06-03 PROCEDURE — 700102 HCHG RX REV CODE 250 W/ 637 OVERRIDE(OP): Performed by: INTERNAL MEDICINE

## 2024-06-03 PROCEDURE — 84100 ASSAY OF PHOSPHORUS: CPT

## 2024-06-03 PROCEDURE — 700111 HCHG RX REV CODE 636 W/ 250 OVERRIDE (IP): Mod: JZ | Performed by: SURGERY

## 2024-06-03 PROCEDURE — 770001 HCHG ROOM/CARE - MED/SURG/GYN PRIV*

## 2024-06-03 PROCEDURE — 80048 BASIC METABOLIC PNL TOTAL CA: CPT

## 2024-06-03 PROCEDURE — 700102 HCHG RX REV CODE 250 W/ 637 OVERRIDE(OP): Performed by: SURGERY

## 2024-06-03 PROCEDURE — 700105 HCHG RX REV CODE 258: Performed by: INTERNAL MEDICINE

## 2024-06-03 PROCEDURE — 83735 ASSAY OF MAGNESIUM: CPT

## 2024-06-03 PROCEDURE — 36415 COLL VENOUS BLD VENIPUNCTURE: CPT

## 2024-06-03 PROCEDURE — A9270 NON-COVERED ITEM OR SERVICE: HCPCS | Performed by: SURGERY

## 2024-06-03 PROCEDURE — 700111 HCHG RX REV CODE 636 W/ 250 OVERRIDE (IP): Performed by: INTERNAL MEDICINE

## 2024-06-03 PROCEDURE — 85025 COMPLETE CBC W/AUTO DIFF WBC: CPT

## 2024-06-03 PROCEDURE — 99233 SBSQ HOSP IP/OBS HIGH 50: CPT | Performed by: INTERNAL MEDICINE

## 2024-06-03 PROCEDURE — A9270 NON-COVERED ITEM OR SERVICE: HCPCS | Performed by: INTERNAL MEDICINE

## 2024-06-03 PROCEDURE — 700111 HCHG RX REV CODE 636 W/ 250 OVERRIDE (IP): Mod: JZ | Performed by: INTERNAL MEDICINE

## 2024-06-03 RX ORDER — LORAZEPAM 1 MG/1
1 TABLET ORAL
Status: DISCONTINUED | OUTPATIENT
Start: 2024-06-03 | End: 2024-06-11 | Stop reason: HOSPADM

## 2024-06-03 RX ORDER — HYDROMORPHONE HYDROCHLORIDE 1 MG/ML
1-2 INJECTION, SOLUTION INTRAMUSCULAR; INTRAVENOUS; SUBCUTANEOUS
Status: DISCONTINUED | OUTPATIENT
Start: 2024-06-03 | End: 2024-06-11 | Stop reason: HOSPADM

## 2024-06-03 RX ORDER — SODIUM HYPOCHLORITE 1.25 MG/ML
SOLUTION TOPICAL PRN
Status: DISCONTINUED | OUTPATIENT
Start: 2024-06-03 | End: 2024-06-05 | Stop reason: ALTCHOICE

## 2024-06-03 RX ADMIN — GABAPENTIN 100 MG: 100 CAPSULE ORAL at 05:17

## 2024-06-03 RX ADMIN — FUROSEMIDE 40 MG: 10 INJECTION INTRAMUSCULAR; INTRAVENOUS at 05:18

## 2024-06-03 RX ADMIN — METHOCARBAMOL TABLETS 500 MG: 500 TABLET, COATED ORAL at 12:19

## 2024-06-03 RX ADMIN — KETOROLAC TROMETHAMINE 15 MG: 15 INJECTION, SOLUTION INTRAMUSCULAR; INTRAVENOUS at 05:18

## 2024-06-03 RX ADMIN — AMOXICILLIN AND CLAVULANATE POTASSIUM 1 TABLET: 875; 125 TABLET, FILM COATED ORAL at 17:26

## 2024-06-03 RX ADMIN — CARVEDILOL 12.5 MG: 12.5 TABLET, FILM COATED ORAL at 17:27

## 2024-06-03 RX ADMIN — FUROSEMIDE 40 MG: 10 INJECTION INTRAMUSCULAR; INTRAVENOUS at 22:20

## 2024-06-03 RX ADMIN — CARVEDILOL 12.5 MG: 12.5 TABLET, FILM COATED ORAL at 07:40

## 2024-06-03 RX ADMIN — METHOCARBAMOL TABLETS 500 MG: 500 TABLET, COATED ORAL at 07:40

## 2024-06-03 RX ADMIN — PIPERACILLIN AND TAZOBACTAM 4.5 G: 4; .5 INJECTION, POWDER, FOR SOLUTION INTRAVENOUS at 00:00

## 2024-06-03 RX ADMIN — HEPARIN SODIUM 5000 UNITS: 5000 INJECTION, SOLUTION INTRAVENOUS; SUBCUTANEOUS at 05:18

## 2024-06-03 RX ADMIN — AMLODIPINE BESYLATE 10 MG: 10 TABLET ORAL at 05:17

## 2024-06-03 RX ADMIN — OXYCODONE HYDROCHLORIDE 10 MG: 10 TABLET ORAL at 22:20

## 2024-06-03 RX ADMIN — FUROSEMIDE 40 MG: 10 INJECTION INTRAMUSCULAR; INTRAVENOUS at 14:45

## 2024-06-03 RX ADMIN — PIPERACILLIN AND TAZOBACTAM 4.5 G: 4; .5 INJECTION, POWDER, FOR SOLUTION INTRAVENOUS at 08:46

## 2024-06-03 RX ADMIN — ACETAMINOPHEN 1000 MG: 500 TABLET, FILM COATED ORAL at 18:29

## 2024-06-03 RX ADMIN — GABAPENTIN 100 MG: 100 CAPSULE ORAL at 12:13

## 2024-06-03 RX ADMIN — HEPARIN SODIUM 5000 UNITS: 5000 INJECTION, SOLUTION INTRAVENOUS; SUBCUTANEOUS at 14:45

## 2024-06-03 RX ADMIN — LEVOTHYROXINE SODIUM 100 MCG: 0.1 TABLET ORAL at 05:17

## 2024-06-03 RX ADMIN — HEPARIN SODIUM 5000 UNITS: 5000 INJECTION, SOLUTION INTRAVENOUS; SUBCUTANEOUS at 22:19

## 2024-06-03 RX ADMIN — VANCOMYCIN HYDROCHLORIDE 1250 MG: 5 INJECTION, POWDER, LYOPHILIZED, FOR SOLUTION INTRAVENOUS at 05:24

## 2024-06-03 RX ADMIN — LORAZEPAM 1 MG: 1 TABLET ORAL at 09:58

## 2024-06-03 RX ADMIN — VANCOMYCIN HYDROCHLORIDE 1250 MG: 5 INJECTION, POWDER, LYOPHILIZED, FOR SOLUTION INTRAVENOUS at 17:29

## 2024-06-03 RX ADMIN — METHOCARBAMOL TABLETS 500 MG: 500 TABLET, COATED ORAL at 17:26

## 2024-06-03 RX ADMIN — ACETAMINOPHEN 1000 MG: 500 TABLET, FILM COATED ORAL at 13:27

## 2024-06-03 RX ADMIN — HYDROMORPHONE HYDROCHLORIDE 0.5 MG: 1 INJECTION, SOLUTION INTRAMUSCULAR; INTRAVENOUS; SUBCUTANEOUS at 11:03

## 2024-06-03 RX ADMIN — ACETAMINOPHEN 1000 MG: 500 TABLET, FILM COATED ORAL at 05:17

## 2024-06-03 RX ADMIN — FLUTICASONE FUROATE, UMECLIDINIUM BROMIDE AND VILANTEROL TRIFENATATE 1 PUFF: 100; 62.5; 25 POWDER RESPIRATORY (INHALATION) at 05:18

## 2024-06-03 RX ADMIN — NICOTINE TRANSDERMAL SYSTEM 21 MG: 21 PATCH, EXTENDED RELEASE TRANSDERMAL at 05:17

## 2024-06-03 RX ADMIN — GABAPENTIN 100 MG: 100 CAPSULE ORAL at 17:26

## 2024-06-03 RX ADMIN — FAMOTIDINE 20 MG: 20 TABLET, FILM COATED ORAL at 05:17

## 2024-06-03 RX ADMIN — METHOCARBAMOL TABLETS 500 MG: 500 TABLET, COATED ORAL at 22:19

## 2024-06-03 ASSESSMENT — ENCOUNTER SYMPTOMS
DIARRHEA: 0
CHILLS: 1
NERVOUS/ANXIOUS: 0
WHEEZING: 1
PND: 1
MYALGIAS: 1
DIZZINESS: 1
COUGH: 0
FEVER: 1
ABDOMINAL PAIN: 0
SHORTNESS OF BREATH: 1
PALPITATIONS: 1
COUGH: 1
NAUSEA: 1
ORTHOPNEA: 1
SHORTNESS OF BREATH: 0
CONSTIPATION: 0
VOMITING: 0
NAUSEA: 0

## 2024-06-03 ASSESSMENT — PATIENT HEALTH QUESTIONNAIRE - PHQ9
SUM OF ALL RESPONSES TO PHQ9 QUESTIONS 1 AND 2: 0
2. FEELING DOWN, DEPRESSED, IRRITABLE, OR HOPELESS: NOT AT ALL
1. LITTLE INTEREST OR PLEASURE IN DOING THINGS: NOT AT ALL

## 2024-06-03 ASSESSMENT — PAIN DESCRIPTION - PAIN TYPE
TYPE: ACUTE PAIN
TYPE: ACUTE PAIN

## 2024-06-03 ASSESSMENT — FIBROSIS 4 INDEX: FIB4 SCORE: 1.35

## 2024-06-03 NOTE — PROGRESS NOTES
Hospital Medicine Daily Progress Note    Date of Service  6/3/2024    Chief Complaint  Rene Kelley is a 53 y.o. male admitted 5/30/2024 with shortness of breath, groin pain     Hospital Course  This is a 53 y.o. male who presented 5/30/2024 with past medical history of hypertension, polysubstance abuse, morbid obesity who presents to the hospital for chest pain and shortness of breath since he was discharged from the hospital on 5/26.   Patient is also noticed redness and drainage from his groin site and his groin right groin. Patient underwent a emergent endovascular repair of type B aortic dissection with placement of a aortic stent graft. Angioplasty and stent placement of the right external iliac. Angioplasty and stenting of the left common iliac. Left to right femoral-femoral artery bypass using graft. The patient does have extensive smoking and meth use. He still continues to smoke cigarettes.   Vascular surgery consulted     Interval Problem Update  Patient seen and examined, going for surgery today with vascular surgery for his infected graft appreciate rec.  I have consulted also infection disease appreciate rec.  Cont on IV abx   Close monitoring on tele   6/1: Patient seen and examined resting in bed, had surgery for his infected graft yesterday with vascular surgery appreciate rec. ID following cont on IV Vanco and zosyn which dose has been increased   Close monitor on tele for decompensation  6/2: Patient seen and examined still with some pain , on wound vac appreciate vascular surgery rec.  ID following abx as per ID rec.  6/3: Patient seen and examined, afebrile vascular surgery following appreciate rec. On wound vac  ID following Zosyn d/c cont on vanco possible will 4 weeks of IV abx and then 2 weeks of PO   Appreciate rec.   I have discussed this patient's plan of care and discharge plan at IDT rounds today with Case Management, Nursing, Nursing leadership, and other members of the IDT  team.    Consultants/Specialty  infectious disease and vascular surgery    Code Status  Full Code    Disposition  The patient is not medically cleared for discharge to home or a post-acute facility.      I have placed the appropriate orders for post-discharge needs.    Review of Systems  Review of Systems   Constitutional:  Positive for chills, fever and malaise/fatigue.   Respiratory:  Positive for cough, shortness of breath and wheezing.    Cardiovascular:  Positive for palpitations, orthopnea, leg swelling and PND.   Gastrointestinal:  Positive for nausea.   Neurological:  Positive for dizziness.        Physical Exam  Temp:  [36.6 °C (97.9 °F)-36.8 °C (98.2 °F)] 36.8 °C (98.2 °F)  Pulse:  [63-98] 64  Resp:  [12-18] 16  BP: (140-158)/(74-93) 146/87  SpO2:  [94 %-96 %] 96 %    Physical Exam  Vitals and nursing note reviewed.   Constitutional:       General: He is not in acute distress.     Appearance: Normal appearance. He is not ill-appearing, toxic-appearing or diaphoretic.   HENT:      Head: Normocephalic and atraumatic.      Nose: No congestion or rhinorrhea.      Mouth/Throat:      Pharynx: No posterior oropharyngeal erythema.   Eyes:      General: No scleral icterus.        Right eye: No discharge.   Neck:      Vascular: Hepatojugular reflux and JVD present.   Cardiovascular:      Rate and Rhythm: Normal rate and regular rhythm.      Pulses: Normal pulses.      Heart sounds: No murmur heard.     No friction rub. No gallop.   Pulmonary:      Effort: Respiratory distress present.      Breath sounds: No stridor. Rales present. No wheezing or rhonchi.   Abdominal:      General: There is distension.      Tenderness: There is no abdominal tenderness.   Musculoskeletal:         General: No swelling, tenderness, deformity or signs of injury.      Cervical back: Normal range of motion.      Right lower leg: Edema present.      Left lower leg: Edema present.   Skin:     Capillary Refill: Capillary refill takes more  than 3 seconds.      Coloration: Skin is not jaundiced or pale.      Findings: Erythema and rash present. No bruising or lesion.      Comments: Scrotal swelling   Neurological:      General: No focal deficit present.      Mental Status: He is alert and oriented to person, place, and time.         Fluids    Intake/Output Summary (Last 24 hours) at 6/3/2024 1457  Last data filed at 6/3/2024 1300  Gross per 24 hour   Intake 1640 ml   Output 5850 ml   Net -4210 ml       Laboratory  Recent Labs     06/01/24  0007 06/03/24  0527   WBC 10.7 7.3   RBC 3.71* 3.96*   HEMOGLOBIN 12.2* 12.9*   HEMATOCRIT 36.6* 38.9*   MCV 98.7* 98.2*   MCH 32.9 32.6   MCHC 33.3 33.2   RDW 51.4* 50.6*   PLATELETCT 219 284   MPV 10.2 9.6     Recent Labs     06/01/24  0007 06/03/24 0527   SODIUM 134* 137   POTASSIUM 4.2 3.8   CHLORIDE 99 103   CO2 24 24   GLUCOSE 100* 94   BUN 23* 19   CREATININE 1.26 1.33   CALCIUM 7.4* 8.2*                       Imaging  EC-ECHOCARDIOGRAM COMPLETE W/ CONT   Final Result      EC-ECHOCARDIOGRAM COMPLETE W/O CONT         DX-CHEST-PORTABLE (1 VIEW)   Final Result      1.  Trace LEFT pleural effusion   2.  Decreased LEFT peripheral pulmonary opacity which could be atelectasis or pneumonia      CT-CTA COMPLETE THORACOABDOMINAL AORTA   Final Result      1.  Type B aortic dissection status post thoracic stent graft. Dissection flap is unchanged extending from just beyond the left subclavian artery origin into the bilateral external iliac arteries.   2.  Proximal descending thoracic aortic aneurysm measuring 4.5 x 4.5 cm.   3.  Status post bilateral common iliac artery stent placement and femorofemoral bypass. Stents and distal femoral arteries appear patent.   4.  Mild stranding and multiple foci of gas in the lower anterior abdominal wall without focal drainable fluid collection just chest abscess.   5.  Redemonstrated extension of the abdominal aortic dissection into the celiac artery which supplied by both the true  and false lumen. Distal vessels appear patent.   6.  Diffuse bilateral tree-in-bud opacities which could be seen in setting edema and/or infection.   7.  15 mm lingular pulmonary nodule.   8.  Trace left pleural effusion.   9.  Nonobstructive bilateral renal calculi.      Otilio Type B aortic dissection      Fleischner Society pulmonary nodule recommendations:   Low and High Risk: Consider CT at 3 months, PET/CT, or tissue sampling.      Low Risk - Minimal or absent history of smoking and of other known risk factors.      High Risk - History of smoking or of other known risk factors.      Note: These recommendations do not apply to lung cancer screening, patients with immunosuppression, or patients with known primary cancer.      Fleischner Society 2017 Guidelines for Management of Incidentally Detected Pulmonary Nodules in Adults               IR-ABDOMINAL AORTA-WITH RUNOFF    (Results Pending)        Assessment/Plan  * Wound infection- (present on admission)  Assessment & Plan  Pustular drainage from his groin wound  Staples removed in the ER  Pain control  Now on vanco and zosyn   Follow-up on wound cultures and blood cultures  Vascular surgery following and plan to get patient to OR  I have also consulted Infection disease     Acute diastolic heart failure (HCC)  Assessment & Plan  Presents decompensated  IV Lasix 40 twice daily  Strict ins and outs and daily weights  Monitor on telemetry      Acute respiratory failure with hypoxia (HCC)  Assessment & Plan  On 3 L of O2 above baseline    Tobacco abuse- (present on admission)  Assessment & Plan  Tobacco cessation education provided for more than 11 minutes.  Explained to the patient that he has COPD and peripheral vascular disease.  He recently had surgery that would be worse with smoking cigarettes.  We discussed the risks of smoking including increased risk of heart disease, stroke, cancer and COPD. We discussed the benefits of quitting smoking. We discussed  options of nicotine patch, wellbutrin and chantix.  The patient has the intention to quit smoking. Nicotine replacement protocol will be provided to the patient.      Acquired hypothyroidism- (present on admission)  Assessment & Plan  Continue levothyroxine    Dissection of aorta (HCC)- (present on admission)  Assessment & Plan  status post emergent endovascular repair of descending thoracic aortic type B dissection with placement of thoracic stent graft  Patient also underwent iliac stenting and femoral to femoral artery bypass  Pain control  Monitor blood pressure  He has multiple stents and graft placement.  I will ask vascular surgery if he needs to be on aspirin or Plavix    Hypertension- (present on admission)  Assessment & Plan  Uncontrolled  Continue home blood pressure medications      Greater than 51 minutes spent prepping to see patient (e.g. review of tests) obtaining and/or reviewing separately obtained history. Performing a medically appropriate examination and/ evaluation.  Counseling and educating the patient/family/caregiver.  Ordering medications, tests, or procedures.  Referring and communicating with other health care professionals.  Documenting clinical information in EPIC.  Independently interpreting results and communicating results to patient/family/caregiver.  Care coordination.      VTE prophylaxis: scd     I have performed a physical exam and reviewed and updated ROS and Plan today (6/3/2024). In review of yesterday's note (6/2/2024), there are no changes except as documented above.

## 2024-06-03 NOTE — CARE PLAN
Problem: Knowledge Deficit - Standard  Goal: Patient and family/care givers will demonstrate understanding of plan of care, disease process/condition, diagnostic tests and medications  Description: Target End Date:  1-3 days or as soon as patient condition allows    Document in Patient Education    1.  Patient and family/caregiver oriented to unit, equipment, visitation policy and means for communicating concern  2.  Complete/review Learning Assessment  3.  Assess knowledge level of disease process/condition, treatment plan, diagnostic tests and medications  4.  Explain disease process/condition, treatment plan, diagnostic tests and medications  Outcome: Progressing     Problem: Fall Risk  Goal: Patient will remain free from falls  Description: Target End Date:  Prior to discharge or change in level of care    Document interventions on the [facility] Fall Risk Assessment    1.  Assess for fall risk factors  2.  Implement fall precautions  Outcome: Progressing     Problem: Pain - Standard  Goal: Alleviation of pain or a reduction in pain to the patient’s comfort goal  Description: Target End Date:  Prior to discharge or change in level of care    Document on Vitals flowsheet    1.  Document pain using the appropriate pain scale per order or unit policy  2.  Educate and implement non-pharmacologic comfort measures (i.e. relaxation, distraction, massage, cold/heat therapy, etc.)  3.  Pain management medications as ordered  4.  Reassess pain after pain med administration per policy  5.  If opiods administered assess patient's response to pain medication is appropriate per POSS sedation scale  6.  Follow pain management plan developed in collaboration with patient and interdisciplinary team (including palliative care or pain specialists if applicable)  Outcome: Progressing     Problem: Care Map:  Day Before Discharge Optimal Outcome for the Heart Failure Patient  Goal: Day Before Discharge:  Optimal Care of the heart  failure patient  Description: Target End Date:  Prior to discharge or change in level of care  Outcome: Progressing     Problem: Care Map:  Day Before Discharge Optimal Outcome for the Heart Failure Patient  Goal: Day Before Discharge:  Optimal Care of the heart failure patient  Description: Target End Date:  Prior to discharge or change in level of care  Outcome: Progressing   The patient is Stable - Low risk of patient condition declining or worsening    Shift Goals  Clinical Goals: IV abx, wound vac management  Patient Goals: comfort  Family Goals: STEVE    Progress made toward(s) clinical / shift goals:  VSS, safety    Patient is not progressing towards the following goals:       53

## 2024-06-03 NOTE — PROGRESS NOTES
Monitor Summary:     SR  (R) PVC, coup  Episode of 5 secs PSVT  HR 63-69  0.18/0.09/0.43

## 2024-06-03 NOTE — CARE PLAN
The patient is Stable - Low risk of patient condition declining or worsening    Shift Goals  Clinical Goals: iv abx, wound vac management  Patient Goals: go home  Family Goals: STEVE    Progress made toward(s) clinical / shift goals:  ambulation, improvement in pedal pulses    Patient is not progressing towards the following goals:    Problem: Care Map:  Admission Optimal Outcome for the Heart Failure Patient  Goal: Admission:  Optimal Care of the heart failure patient  Outcome: Progressing     Problem: Care Map:  Day 1 Optimal Outcome for the Heart Failure Patient  Goal: Day 1:  Optimal Care of the heart failure patient  Outcome: Progressing     Problem: Care Map:  Day 2 Optimal Outcome for the Heart Failure Patient  Goal: Day 2:  Optimal Care of the heart failure patient  Outcome: Progressing     Problem: Care Map:  Day 3 Optimal Outcome for the Heart Failure Patient  Goal: Day 3:  Optimal Care of the heart failure patient  Outcome: Progressing     Problem: Care Map:  Day Before Discharge Optimal Outcome for the Heart Failure Patient  Goal: Day Before Discharge:  Optimal Care of the heart failure patient  Outcome: Progressing     Problem: Care Map:  Day of Discharge Optimal Outcome for the Heart Failure Patient  Goal: Day of Discharge:  Optimal Care of the heart failure patient  Outcome: Progressing     Problem: Knowledge Deficit - Standard  Goal: Patient and family/care givers will demonstrate understanding of plan of care, disease process/condition, diagnostic tests and medications  Outcome: Progressing     Problem: Fall Risk  Goal: Patient will remain free from falls  Outcome: Progressing     Problem: Pain - Standard  Goal: Alleviation of pain or a reduction in pain to the patient’s comfort goal  Outcome: Progressing

## 2024-06-03 NOTE — PROGRESS NOTES
VASCULAR SURGERY PROGRESS NOTE      Awake, no complaints.  Wound VAC's were changed this morning.  AF, 146/87.    General: Morbidly obese male in no apparent distress.  Abdomen: Soft, nondistended, obese.  Lower extremities, VAC in place no output, no bleeding.  Feet are warm, well-perfused.  2+ edema.    Labs: Reviewed.    Assessment: Status post removal of infected femoral-femoral bypass, angiogram with stent placement, right sartorius muscle flap, and wound VAC placement.    Plan:  Doing well.  Discharge planning. Will need home wound VAC.  Blood pressure control.  Keep systolic blood pressure 140 or less.    Discussed with patient.  All questions were answered.    Appreciate Dr. Adan's management of this patient.

## 2024-06-03 NOTE — PROGRESS NOTES
Infectious Disease Progress Note    Author: Domi Moss M.D. Date & Time of service: 6/3/2024  1:11 PM    Chief Complaint:  Vascular graft surgical site infection     Interval History:      Review of Systems:  Review of Systems   Respiratory:  Negative for cough and shortness of breath.    Gastrointestinal:  Negative for abdominal pain, constipation, diarrhea, nausea and vomiting.   Musculoskeletal:  Positive for myalgias.   Psychiatric/Behavioral:  The patient is not nervous/anxious.        Hemodynamics:  Temp (24hrs), Av.7 °C (98.1 °F), Min:36.6 °C (97.9 °F), Max:36.8 °C (98.2 °F)  Temperature: 36.8 °C (98.2 °F)  Pulse  Av.6  Min: 53  Max: 98   Blood Pressure: (!) 146/87       Physical Exam:  Physical Exam  Cardiovascular:      Rate and Rhythm: Normal rate.   Pulmonary:      Effort: Pulmonary effort is normal.   Abdominal:      General: Abdomen is flat.   Musculoskeletal:      Comments: Bilateral groin wounds, reviewed photos.  Wound vacs in place.   Skin:     General: Skin is warm and dry.   Neurological:      General: No focal deficit present.      Mental Status: He is oriented to person, place, and time.   Psychiatric:         Mood and Affect: Mood normal.         Behavior: Behavior normal.         Meds:    Current Facility-Administered Medications:     HYDROmorphone    LORazepam    dakins 0.125% (1/4 strength)    vancomycin    piperacillin-tazobactam    Pharmacy Consult Request    ondansetron    dexamethasone    diphenhydrAMINE    haloperidol lactate    scopolamine    heparin    acetaminophen **FOLLOWED BY** [START ON 2024] acetaminophen    oxyCODONE immediate-release **OR** oxyCODONE immediate-release **OR** HYDROmorphone    labetalol    ondansetron    promethazine    promethazine    prochlorperazine    amLODIPine    carvedilol    fluticasone-umeclidinium-vilanterol    gabapentin    levothyroxine    methocarbamol    nicotine    famotidine    furosemide    MD Alert...Vancomycin per  Pharmacy    Labs:  Recent Labs     24   WBC 10.7 7.3   RBC 3.71* 3.96*   HEMOGLOBIN 12.2* 12.9*   HEMATOCRIT 36.6* 38.9*   MCV 98.7* 98.2*   MCH 32.9 32.6   RDW 51.4* 50.6*   PLATELETCT 219 284   MPV 10.2 9.6   NEUTSPOLYS  --  69.80   LYMPHOCYTES  --  12.60*   MONOCYTES  --  9.70   EOSINOPHILS  --  5.20   BASOPHILS  --  1.10     Recent Labs     24   SODIUM 134* 137   POTASSIUM 4.2 3.8   CHLORIDE 99 103   CO2 24 24   GLUCOSE 100* 94   BUN 23* 19     Recent Labs     24   CREATININE 1.26 1.33       Imaging:  EC-ECHOCARDIOGRAM COMPLETE W/ CONT    Result Date: 2024  Transthoracic Echo Report Echocardiography Laboratory CONCLUSIONS Technically difficult but adequate study for interpretation. Severe concentric left ventricular hypertrophy. The left ventricular ejection fraction is visually estimated to be 55%. Grossly normal right ventricular size and systolic function. The aortic root is dilated with a diameter of 4.2  cm. JAYA DUCKWORTH Exam Date:         2024                    08:05 Exam Location:     Inpatient Priority:          Routine Ordering Physician:        HENNY COSME Referring Physician: Sonographer:               Lalo Clements RDCS, RVT Age:    53     Gender:    M MRN:    [id number] :    1970 BSA:    2.55   Ht (in):    70     Wt (lb):    320 Exam Type:     Complete Indications:     Shortness of breath ICD Codes:       R06.02 CPT Codes:       15042 BP:   120    /   56     HR:   66 Technical Quality:       Poor MEASUREMENTS  (Male / Female) Normal Values 2D ECHO LV Diastolic Diameter PLAX        5.3 cm                4.2 - 5.9 / 3.9 - 5.3 cm LV Systolic Diameter PLAX         3.5 cm                2.1 - 4.0 cm IVS Diastolic Thickness           2 cm                  LVPW Diastolic Thickness          1.7 cm                LVOT Diameter                     2 cm                  Estimated  LV Ejection Fraction    55 %                  LA Volume Index                   44 cm3/m2             16 - 28 cm3/m2 DOPPLER AV Peak Velocity                  1.2 m/s               AV Peak Gradient                  5.9 mmHg              AV Mean Gradient                  3 mmHg                LVOT Peak Velocity                1.2 m/s               AV Area Cont Eq vti               3.6 cm2               MV Velocity Time Integral         30.7 cm               Mitral E Point Velocity           0.95 m/s              Mitral E to A Ratio               1                     MV Pressure Half Time             68 ms                 MV Area PHT                       3.2 cm2               MV Deceleration Time              233 ms                Pulmonary Vein Systolic Velocity  0.53 m/s              Pulmonary Vein Diastolic Velocit  0.39 m/s              Pulmonary Vein S/D Ratio          1.4                   Pulmonary Vein A Velocity         0.29 m/s              Pulmonary Vein A Duration         148 ms                PV Peak Velocity                  0.94 m/s              PV Peak Gradient                  3.5 mmHg              PV Mean Gradient                  2 mmHg                RVOT Peak Velocity                0.67 m/s              LV E' Lateral Velocity            12.3 cm/s             Mitral E to LV E' Lateral Ratio   7.7                   LV E' Septal Velocity             8.5 cm/s              Mitral E to LV E' Septal Ratio    11.1                  * Indicates values subject to auto-interpretation LV EF:  55    % FINDINGS Left Ventricle 3 mL of contrast was used to enhance visualization of the endocardial border. Existing IV was used at the left antecubical.  Normal left ventricular chamber size. Severe concentric left ventricular hypertrophy. Normal left ventricular systolic function. The left ventricular ejection fraction is visually estimated to be 55%. No regional wall motion abnormalities. Normal diastolic  function. Right Ventricle Grossly normal right ventricular size and systolic function. Right Atrium Right atrium not well visualized. Normal inferior vena cava size and inspiratory collapse. Left Atrium The left atrium is not well visualized. Mitral Valve Thickened mitral valve leaflets. No mitral stenosis. Trace mitral regurgitation. Aortic Valve The aortic valve is not well visualized but appears trileaflet. No stenosis or regurgitation seen. Tricuspid Valve The tricuspid valve is not well visualized. No tricuspid stenosis. Trace tricuspid regurgitation. Right atrial pressure is estimated to be 3 mmHg. Unable to estimate right ventricular systolic pressure due to an inadequate tricuspid regurgitant jet. Pulmonic Valve The pulmonic valve is not well visualized. Pericardium No pericardial effusion. Aorta The ascending aorta diameter is 4.0 cm. The aortic root is dilated with a diameter of 4.2  cm. Duane Mohan MD (Electronically Signed) Final Date:     02 June 2024                 13:58    EC-ECHOCARDIOGRAM COMPLETE W/O CONT    Result Date: 6/2/2024  Results Will be Available after Interpretation by Cardiologist.    CT-CTA COMPLETE THORACOABDOMINAL AORTA    Result Date: 5/30/2024 5/30/2024 2:38 PM HISTORY/REASON FOR EXAM:  CHEST PAIN; SHORTNESS OF BREATH; DIZZINESS; GROIN PAIN; WOUND INFECTION. TECHNIQUE/EXAM DESCRIPTION: CT angiogram without and with contrast, with reconstructions. Initial precontrast thick helical sections were obtained from the top of the aortic arch through the diaphragmatic domes. Following this, thin-section postcontrast helical images were obtained from the lung apices through the iliac crests following the bolus administration of 100 mL of nonionic Omnipaque 350 contrast.  CT angiographic technique was utilized. Parasagittal reconstructed images of the aorta were generated utilizing multiplanar volume reformat technique. Low dose optimization technique was utilized for this CT exam  including automated exposure control and adjustment of the mA and/or kV according to patient size. COMPARISON: CTA aorta 5/21/2024 FINDINGS: Aorta: Patient is status post thoracic aortic stent graft placement with the proximal landing zone just beyond the origin of the left subclavian artery and the distal landing zone in the mid descending thoracic aorta. There is contrast opacification of the false lumen extends inferiorly in the descending thoracic aorta to approximately the level of the distal landing zone of the stent graft. Aortic arch: Branching pattern is conventional. Diameter: There is aneurysmal dilation of the proximal descending thoracic aorta measuring 4.5 x 4.5 cm. Dissection: Present extending from the distal arch just beyond the level of the left subclavian artery origin inferiorly into the bilateral common iliac and external iliac arteries. Celiac artery: Supplied by the true and false lumens with the dissection flap extends into the celiac artery there is unchanged partial occlusion with distal flow. Superior mesenteric artery origin: Widely patent. Supplied by the true lumen Renal artery origins: Widely patent. The left kidney is supplied by the false lumen Inferior mesenteric artery: Patent, supplied by the false lumen. Common iliac arteries: Interval placement of bilateral common iliac artery stents which appear to be patent. There is a femoral to femoral artery bypass. Bilateral superficial femoral and profunda femoris arteries appear to be patent. There is mild stranding and multiple foci of gas in the lower anterior abdominal wall without focal drainable fluid collection to suggest abscess. Heart: Normal size. No pericardial effusion. Coronary artery origins are conventional. 3D angiographic/MIP images of the vasculature confirm the vascular findings as described above. Lungs: Diffuse bilateral tree-in-bud opacities. 15 mm lingular nodule. Left basilar atelectasis and/or consolidation. Trace  left effusion. Liver: Homogeneous enhancement. No masses identified. Biliary system: No intrahepatic or extrahepatic ductal dilatation. The gallbladder is surgically absent. Spleen: Unremarkable. Adrenal glands: Unremarkable. Pancreas: Unremarkable. Kidneys: Symmetric enhancement. No solid mass is identified. There are nonobstructive bilateral renal calculi. Bowel: Unremarkable. No pneumoperitoneum. Ascites: None. Lymph nodes: No adenopathy is identified. Bones: Unremarkable.     1.  Type B aortic dissection status post thoracic stent graft. Dissection flap is unchanged extending from just beyond the left subclavian artery origin into the bilateral external iliac arteries. 2.  Proximal descending thoracic aortic aneurysm measuring 4.5 x 4.5 cm. 3.  Status post bilateral common iliac artery stent placement and femorofemoral bypass. Stents and distal femoral arteries appear patent. 4.  Mild stranding and multiple foci of gas in the lower anterior abdominal wall without focal drainable fluid collection just chest abscess. 5.  Redemonstrated extension of the abdominal aortic dissection into the celiac artery which supplied by both the true and false lumen. Distal vessels appear patent. 6.  Diffuse bilateral tree-in-bud opacities which could be seen in setting edema and/or infection. 7.  15 mm lingular pulmonary nodule. 8.  Trace left pleural effusion. 9.  Nonobstructive bilateral renal calculi. Otilio Type B aortic dissection Fleischner Society pulmonary nodule recommendations: Low and High Risk: Consider CT at 3 months, PET/CT, or tissue sampling. Low Risk - Minimal or absent history of smoking and of other known risk factors. High Risk - History of smoking or of other known risk factors. Note: These recommendations do not apply to lung cancer screening, patients with immunosuppression, or patients with known primary cancer. Fleischner Society 2017 Guidelines for Management of Incidentally Detected Pulmonary Nodules  in Adults     DX-CHEST-PORTABLE (1 VIEW)    Result Date: 5/30/2024 5/30/2024 2:57 PM HISTORY/REASON FOR EXAM:  Sepsis; sepsis TECHNIQUE/EXAM DESCRIPTION AND NUMBER OF VIEWS: Single portable view of the chest. COMPARISON: 5 20 5/24 FINDINGS: Thoracic aortic endograft redemonstrated. HEART: Stable size. LUNGS: LEFT lateral mid lung opacity is slightly decreased. PLEURA: There is slight blunting of the LEFT costophrenic sulcus.     1.  Trace LEFT pleural effusion 2.  Decreased LEFT peripheral pulmonary opacity which could be atelectasis or pneumonia    DX-CHEST-PORTABLE (1 VIEW)    Result Date: 5/25/2024 5/25/2024 5:23 AM HISTORY/REASON FOR EXAM:  Chest Pain. TECHNIQUE/EXAM DESCRIPTION AND NUMBER OF VIEWS: Single portable view of the chest. COMPARISON: 5/24/2024 FINDINGS: Thoracic stent graft noted. Cardiac silhouette is normal in size. Stable mild left basilar atelectasis. Stable left pleural fluid and thickening, resulting in left lower thoracic hazy opacification.     No change. Stable left lower thoracic subsegmental atelectasis and small amount of left pleural fluid.    US-RENAL    Result Date: 5/24/2024 5/24/2024 8:14 PM HISTORY/REASON FOR EXAM:  Abnormal Labs TECHNIQUE/EXAM DESCRIPTION: Renal ultrasound. COMPARISON:  None FINDINGS: The right kidney measures 11.59 cm.  The right kidney appears normal in contour and parenchymal echotexture. The corticomedullary differentiation is preserved. The right renal collecting system is not dilated. No hydronephrosis. There are no renal calculi. The left kidney measures 11.61 cm. The left kidney appears normal in contour and parenchymal echotexture. The corticomedullary differentiation is preserved. The left renal collecting system is not dilated. No hydronephrosis. There are no renal calculi. The bladder is decompressed.     Limited exam. 1.  No hydronephrosis. No renal calculus.    DX-CHEST-PORTABLE (1 VIEW)    Result Date: 5/24/2024 5/24/2024 5:26 AM HISTORY/REASON  FOR EXAM: Other (Comment); Intubated TECHNIQUE/EXAM DESCRIPTION:  Single AP view of the chest. COMPARISON: Yesterday FINDINGS: Position of medical devices appears stable. Cardiomegaly is observed. The mediastinal contour appears within normal limits.  The central  pulmonary vasculature appears prominent and indistinct. Bilateral lung volumes are diminished.  Diffuse scattered hazy pulmonary parenchymal opacities are seen. Blunting of the left costophrenic angle is seen suggesting trace left effusion. The bony structures appear age-appropriate.     1.  Pulmonary edema and/or infiltrates are identified, which are stable since the prior exam. 2.  Trace left pleural effusion, stable 3.  Cardiomegaly    EC-WILFREDO W/O CONT    Result Date: 2024  Transesophageal Echo Report Echocardiography Laboratory CONCLUSIONS Intraoperative WILFREDO during Type B dissection repair (TEVAR).  Normal biventricular function.  Extensive Type B dissection that appears to originateat the distal aortic arch near the left subclavian artery and extends distally to the visualized portion of the descending aorta.  There appears to be clot visualized in the false lumen.  Findings communicated at the time of exam. JAYA DUCKWORTH Exam Date:          2024                     12:00 Exam Location:      Inpatient Priority:            Routine Ordering Physician:        MILKA SCOTT Referring Physician: Sonographer:               Raquel MADDEN Age:    53     Gender:    M MRN:    [id number] :    1970 BSA:           Ht (in):           Wt (lb): Report Type:      Complete Indications:     Dissection of unspecified site of aorta ICD Codes:       I71.00 CPT Codes:       39763 BP:          /          HR: Technical Quality:       Fair MEASUREMENTS  (Male / Female) Normal Values 2D ECHO Estimated LV Ejection Fraction    55 %                  * Indicates values subject to auto-interpretation LV EF:  55    % Medications  Medications determined by anesthesiologist. Limitations none Complications none Proc. Components The probe was inserted and manipulated by Dr Rodriguez. Epiq probe #OR 2  was used for this procedure. 2D, color Doppler, spectral Doppler, and 3D imaging were used as part of the evaluation as clinically indicated. FINDINGS Left Ventricle The left ventricle is normal in size and thickness. Right Ventricle The right ventricle is normal in size and systolic function. Right Atrium The right atrium is normal in size. Left Atrium The left atrium is normal in size. LA Appendage Normal left atrial appendage. IA Septum The interatrial septum is normal. IV Septum The interventricular septum is normal. Mitral Valve Structurally normal mitral valve without significant stenosis or regurgitation. Aortic Valve Structurally normal aortic valve without significant stenosis or regurgitation. Tricuspid Valve Structurally normal tricuspid valve without significant stenosis or regurgitation. Pulmonic Valve Structurally normal pulmonic valve without significant stenosis or regurgitation. Pericardium No pericardial effusion. Aorta Extensive Type B dissection that appears to originateat the distal aortic arch near the left subclavian artery and extends distally to the visualized portion of the descending aorta.  There appears to be clot visualized in the false lumen. Elias Rodriguez (Electronically Signed) Final Date:     23 May 2024 15:15    DX-CHEST-PORTABLE (1 VIEW)    Result Date: 5/23/2024 5/23/2024 5:41 AM HISTORY/REASON FOR EXAM: Other (Comment); Intubated TECHNIQUE/EXAM DESCRIPTION:  Single AP view of the chest. COMPARISON: Yesterday FINDINGS: Endotracheal tube has been removed in the interim.  Nasogastric tube has been removed in the interim.  Otherwise medical device position is stable. Cardiomegaly is observed. The mediastinal contour appears within normal limits.  The central  pulmonary vasculature appears prominent and indistinct.  Bilateral lung volumes are diminished.  Diffuse scattered hazy pulmonary parenchymal opacities are seen. Blunting of the left costophrenic angle is seen suggesting trace left effusion. The bony structures appear age-appropriate.     1.  Pulmonary edema and/or infiltrates are identified, which are stable since the prior exam. 2.  Trace left pleural effusion 3.  Cardiomegaly    DX-CHEST-PORTABLE (1 VIEW)    Result Date: 5/22/2024 5/22/2024 5:34 AM HISTORY/REASON FOR EXAM: Other (Comment); Intubated TECHNIQUE/EXAM DESCRIPTION:  Single AP view of the chest. COMPARISON: Yesterday FINDINGS: Nasogastric tube terminates below the lower margin of the film within the abdomen.  Otherwise medical device position is stable. Cardiomegaly is observed. The mediastinal contour appears within normal limits.  The central pulmonary vasculature appears normal. Bilateral lung volumes are diminished.  Diffuse scattered hazy pulmonary parenchymal opacities are seen. No significant pleural effusions are identified. The bony structures appear age-appropriate.     1.  Mild pulmonary edema and/or infiltrates. 2.  Cardiomegaly    DX-ABDOMEN FOR TUBE PLACEMENT    Result Date: 5/22/2024 5/22/2024 5:34 AM HISTORY/REASON FOR EXAM: OG tube placement TECHNIQUE/EXAM DESCRIPTION: Single AP view of the abdomen COMPARISON:  None FINDINGS: Hazy left lower lobe opacities are seen. Small left pleural effusion is seen. Nasogastric tube is seen, the tip overlies the lumbar spine.  The bowel gas pattern in the upper abdomen appears nonspecific. The bony structures appear age-appropriate.     1.  Nonspecific bowel gas pattern in the upper abdomen. 2.  Nasogastric tube tip terminates overlying the expected location of the gastric antrum. 3.  Hazy left lower lobe infiltrate. 4.  Small left pleural effusion    DX-CHEST-LIMITED (1 VIEW)    Result Date: 5/21/2024 5/21/2024 2:49 PM HISTORY/REASON FOR EXAM:  Life support line and tube placement. TECHNIQUE/EXAM  DESCRIPTION AND NUMBER OF VIEWS: Single portable view of the chest. COMPARISON: CTA aorta and runoffs, 5/21/2024. FINDINGS: The endotracheal tube terminates 5.9 cm above the eleir. The right internal jugular central venous access catheter terminates over the superior vena cava. There is parenchymal volume loss involving the left lung, with a small left pleural effusion. The right lung is clear. The right costophrenic recess is sharp. No visible pneumothorax bilaterally. The cardiac silhouette size is enlarged. There is a metallic stent in the descending thoracic aorta.     1. Appropriately positioned endotracheal tube and right internal jugular central venous access catheter. 2. No postprocedure visible pneumothorax. 3. Lingular atelectasis with small left pleural effusion.    CT-LOWER EXTREMITY BILATERAL WITH CONTRAST    Result Date: 5/21/2024 5/21/2024 9:11 AM HISTORY/REASON FOR EXAM:  Aortic dissection, right leg arterial insufficiency TECHNIQUE/EXAM DESCRIPTION AND NUMBER OF VIEWS: CT angiogram of the right lower extremity following the intravenous demonstration of 100 mL Omnipaque 350 COMPARISON: CT angiogram of the chest, abdomen, and pelvis 5/18/2024 FINDINGS: Again identified is a type B aortic dissection. This extends into the right and left common iliac arteries, proximal aspect of the left external iliac artery, and into the right external iliac artery. There is thrombus throughout the right common iliac artery, external iliac artery, and reconstitution at the level of the common femoral artery. Right superficial femoral artery has diminished but present flow. There is marked decrease in flow in the right popliteal artery In the leg no large vessel arterial flow is identified.     1.  Aortic dissection extending into the right common iliac and right external iliac arteries with associated thrombus 2.  Reconstitution at the level of the common femoral and superficial femoral arteries 3.  At the level of  the mid thigh there is lack of detectable flow within the superficial femoral artery 4.  Lack of detectable flow within the popliteal artery or the arteries of the right calf    CT-CTA AORTA-RO WITH & W/O-POST PROCESS    Result Date: 5/21/2024 5/21/2024 8:53 AM HISTORY/REASON FOR EXAM:  Chest and back pain, evaluate for aortic dissection TECHNIQUE/EXAM DESCRIPTION: CT angiogram of the abdominal aorta with runoff without and with contrast, with reconstructions. Initial precontrast images were obtained from the diaphragm through the lesser trochanters. Subsequently, thin section postcontrast helical images were obtained from the diaphragm through the ankles following the IV bolus administration of 145 mL of Omnipaque 350. CT angiographic technique was utilized. 3D angiographic images were reviewed on PACS. Maximum intensity projection (MIP) images were generated and reviewed. Low dose optimization technique was utilized for this CT exam including automated exposure control and adjustment of the mA and/or kV according to patient size. COMPARISON: CTA chest FINDINGS: Noncontrast images show no intramural hematoma. Contrast-enhanced images: The ascending aorta is normal in appearance There is a aortic dissection. This originates at the level of the left subclavian artery, extends to the abdomen, and there is involvement of the celiac axis where there is thrombus. The superior mesenteric artery is patent. Single right and single left renal arteries are patent Dissection extends into both common iliac artery. There is thrombus within the right common and external iliac arteries. Osseous structures: Within normal limits. Chest: LUNGS: There is linear density within the lingular segment left upper lobe in the left lower lobe. This represents atelectasis. MEDIASTINUM: There is no adenopathy, mass, or other abnormality within the axilla, the mediastinum, or the jaclyn. PLEURA: There no pleural effusion or pneumothoraces.  Abdomen: LIVER: Liver shows morphologic changes consistent with cirrhosis. There is also decreased in density consistent with hepatic steatosis and there is hepatomegaly. SPLEEN: The spleen is normal in appearance. PANCREAS: The pancreas is normal in appearance. GALLBLADDER and biliary system: No gallstones or biliary dilation identified Venous vasculature: The splenic vein and portal vein enhance normally. ADRENAL GLANDS: The adrenal glands are normal in appearance. KIDNEYS: The kidneys are normal in appearance. BOWEL: No bowel or mesenteric abnormality identified. Pelvis: Within normal limits. 3D angiographic/MIP images of the vasculature confirm the vascular findings as described above.     1.  [place] type B aortic dissection 2.  Importantly, there is thrombus filling the celiac axis and there is distal flow that is likely reconstitution. 3.  The renal arteries are patent. 4.  There is thrombosis/occlusion of the right common iliac artery and right external iliac artery extending to the femoral artery. The visualized femoral artery is patent. 5.  Findings were discussed with TROY BALL on 5/21/2024 10:38 AM.      Micro:  Results       Procedure Component Value Units Date/Time    CULTURE WOUND W/ GRAM STAIN [[id number]]  (Abnormal) Collected: 05/30/24 1635    Order Status: Completed Specimen: Wound from Exudate Updated: 06/03/24 1007     Significant Indicator POS     Source WND     Site LLQ Abdomen     Culture Result Light growth usual skin eliza.     Gram Stain Result Many WBCs.  No organisms seen.       Culture Result Corynebacterium amycolatum  Moderate growth      Urine Culture (New) [[id number]] Collected: 05/30/24 2150    Order Status: Completed Specimen: Urine Updated: 06/01/24 2008     Significant Indicator NEG     Source UR     Site -     Culture Result No growth at 48 hours.    Narrative:      Indication for culture:->Emergency Room Patient    Blood Culture - Draw one from central line and one from  peripheral site [[id number]] Collected: 05/30/24 1529    Order Status: Completed Specimen: Blood from Line Updated: 05/31/24 0644     Significant Indicator NEG     Source BLD     Site Peripheral     Culture Result No Growth  Note: Blood cultures are incubated for 5 days and  are monitored continuously.Positive blood cultures  are called to the RN and reported as soon as  they are identified.      Narrative:      Right Hand    Blood Culture - Draw one from central line and one from peripheral site [[id number]] Collected: 05/30/24 1517    Order Status: Completed Specimen: Blood from Peripheral Updated: 05/31/24 0644     Significant Indicator NEG     Source BLD     Site PERIPHERAL     Culture Result No Growth  Note: Blood cultures are incubated for 5 days and  are monitored continuously.Positive blood cultures  are called to the RN and reported as soon as  they are identified.      Narrative:      Right Forearm/Arm    MRSA By PCR (Amp) [[id number]] Collected: 05/30/24 2150    Order Status: Completed Specimen: Respirate from Nares Updated: 05/30/24 2358     MRSA by PCR Negative    CoV-2, Flu A/B, And RSV by PCR (Cepheid) [[id number]] Collected: 05/30/24 2150    Order Status: Completed Specimen: Respirate Updated: 05/30/24 2303     Influenza virus A RNA Negative     Influenza virus B, PCR Negative     RSV, PCR Negative     SARS-CoV-2 by PCR NotDetected     Comment: [facility] providers: PLEASE REFER TO DE-ESCALATION AND RETESTING PROTOCOL  on [email or web address]    **The [employer] GeneXpert Xpress SARS-CoV-2 RT-PCR Test has been made  available for use under the Emergency Use Authorization (EUA) only.          SARS-CoV-2 Source NP Swab    GRAM STAIN [[id number]] Collected: 05/30/24 1635    Order Status: Completed Specimen: Wound Updated: 05/30/24 2236     Significant Indicator .     Source WND     Site LLQ Abdomen     Gram Stain Result Many WBCs.  No organisms seen.      Urinalysis [[id number]]  (Abnormal) Collected: 05/30/24 2150     Order Status: Completed Specimen: Urine Updated: 05/30/24 2220     Color Yellow     Character Clear     Specific Gravity 1.023     Ph 5.5     Glucose Negative mg/dL      Ketones Negative mg/dL      Protein Negative mg/dL      Bilirubin Negative     Urobilinogen, Urine 1.0     Nitrite Negative     Leukocyte Esterase Negative     Occult Blood Small     Micro Urine Req Microscopic            Assessment:  Active Hospital Problems    Diagnosis     *Wound infection [T14.8XXA, L08.9]     Acute respiratory failure with hypoxia (HCC) [J96.01]     Acute diastolic heart failure (HCC) [I50.31]     Tobacco abuse [Z72.0]     Acquired hypothyroidism [E03.9]     Dissection of aorta (HCC) [I71.00]     Hypertension [I10]      Interval 24 hours:      AF, O2 RA  Labs reviewed to assess for clinical status, drug toxicity and organ function  Micro reviewed    Patient underwent VAC change today.  Some ongoing groin pain particular in the right.  Continued on antibiotics as below.      ASSESSMENT/PLAN:      53 y.o.  admitted 5/30/2024. Pt has a past medical history of methamphetamine use, tobacco abuse, obesity who presented to the hospital complaining of shortness of breath.  He been recently admitted with acute type B aortic dissection and underwent surgery to repair the dissection with thoracic aortic stent graft placed in the descending thoracic aorta.  Patient also with bilateral lower extremity ischemia and left lower extremity was reperfused with iliac stenting.  Per vascular note the right iliac artery could not be recanalized with stent placement so the left to right femoral to femoral bypass was placed.  Patient was complaining of erythema and drainage from the groin incisions.  Vascular surgery evaluated felt he had an infection in the femoral to femoral bypass graft area.  Staples removed from the left groin incision and purulent drainage was expressed and sent for culture.  He went back to the OR with vascular surgery on  5/31.     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  Antibiotics were started after or very close to the time culture was obtained so likely did not interfere with the culture yield.  Due to this will treat for chronic bacterium infection and antibiotic will also provide good coverage for common surgical site infections such as staph's and Streptococcus.   -OR with vascular surgery on 5/31 with removal of infected prosthetic femoral-femoral bypass graft and primary closure, placement of kissing bilateral common iliac artery stents, right side torso muscle flap and application of bilateral groin wound VAC  -MRSA nares negative  -Wound culture from surgical area +corynebacterium amycolatum was the predominant growth, will request send-out for susceptibility   -Per micro the patient also had light growth of another corynebacterium species, 2 coag negative staphs and questionable strep.  This is not unexpected given the location of the wound so will select antibiotics that should treat all of these organisms.    Bilateral deep groin wounds, reviewed photos and no obvious ongoing infection although some slough noted, wound vacs in place    Recent acute type B aortic dissection extending from the left subclavian artery into both iliac arteries  -Status post surgical repair of the aortic dissection, bypass with stenting as described above  History of methamphetamine abuse  -UDS on 5/22 positive for amphetamines, fentanyl and opiates  Tobacco abuse     Plan:     Recommendations for antibiotics:   --- Continue vancomycin and will stop Zosyn, recommend 6 weeks antibiotic course and will continue vancomycin - potentially will do 4  weeks of IV with either vancomycin or daptomycin (end 6/28/24) and then transition to po linezolid 600 mg bid for the final 2 weeks (end 7/12/24)     --- The patient is very deep bilateral groin wounds and has had recent extensive vascular surgery and stenting.  He is at risk for superinfection of the area which could possibly extend into the stenting - due to this will start Augmentin 875/125 twice daily as a preventative measure and continue for a 6-week course, end 7/12/24      Recommendations for further/ongoing work-up, monitoring of clinical status and drug toxicity:  --- Follow-up wound culture  --- Follow-up blood cultures, no growth to date  --- Monitor lab  --- Wound care  --- Wound VAC management per surgery    Please place referral order for outpatient ID clinic.    Follow-up in ID clinic in 4 weeks.  Zara to be seen by NP Shell        Dispo: Awaiting culture results and work-up as above.  Patient is at risk for infectious complications including death.  Anticipate prolonged IV antibiotic course.  Patient is not a candidate for outpatient IV antibiotics given recent drug use.  He is agreeable to going to skilled facility for IV antibiotics and wound care     PICC: zara SHULTZ to place PICC line if he has been accepted at skilled facility        Plan of care discussed wound care.   ID will follow peripherally.      Domi Moss M.D.

## 2024-06-03 NOTE — DISCHARGE PLANNING
Care Transition Team Assessment    Patient is 53-year-old male admitted for wound infection. Patient is alert and oriented x4. Please see pt's H&P for prior medical history. Patient reports he lives in a mobile home with roommates; 5 steps to enter, physical address is [phone] [facility] [address]. Pt's states his mother is good support for him. Patient [facility] Patient reports, pt is independent with ADL's and does not utilize DME. Patient confirmed medical insurance is [facility]; reports 40% service connected. Patient reports, patient has means to transportation once medically stable for discharge.    Information Source  Orientation Level: Oriented X4  Information Given By: Patient  Who is responsible for making decisions for patient? : Patient    Readmission Evaluation  Is this a readmission?: Yes - unplanned readmission    Elopement Risk  Legal Hold: No  Ambulatory or Self Mobile in Wheelchair: Yes  Disoriented: No  Psychiatric Symptoms: None  History of Wandering: No  Elopement this Admit: No  Vocalizing Wanting to Leave: No  Displays Behaviors, Body Language Wanting to Leave: No-Not at Risk for Elopement  Elopement Risk: Not at Risk for Elopement    Interdisciplinary Discharge Planning  Lives with - Patient's Self Care Capacity: Unrelated Adult  Patient or legal guardian wants to designate a caregiver: No  Support Systems: Friends / Neighbors, Parent  Housing / Facility: Mobile Home  Prior Services: Home-Independent    Discharge Preparedness  What is your plan after discharge?: Home with help  What are your discharge supports?: Parent  Prior Functional Level: Ambulatory, Independent with Activities of Daily Living, Independent with Medication Management    Functional Assesment  Prior Functional Level: Ambulatory, Independent with Activities of Daily Living, Independent with Medication Management    Vision / Hearing Impairment  Vision Impairment : Yes  Right Eye Vision: Wears  Glasses  Left Eye Vision: Wears Glasses  Hearing Impairment : No    Domestic Abuse  Possible Abuse/Neglect Reported to:: Not Applicable    Anticipated Discharge Information  Discharge Disposition: Discharged to home/self care (01)

## 2024-06-03 NOTE — THERAPY
Occupational Therapy   Initial Evaluation     Patient Name: Rene Kelley  Age:  53 y.o., Sex:  male  Medical Record #: [id number]  Today's Date: 6/2/2024     Precautions: Fall Risk  Comments: Wound vac    Assessment  Patient is 53 y.o. male admitted with SOB and groin pain. Pt was recently discharged from [facility] (5/26) where he underwent an emergent endovascular repair of descending thoracic aortic type B dissection with placement of thoracic stent graft. During this admission, pt was found to have a wound infection at his graft sit and underwent removal of femoral bypass graft, angiogram with stent placement, right sartorius muscle graft, and placement of wound vac. PMHx of HTN, polysubstance abuse, and morbid obesity. Pt seen for OT eval. Pt currently resides in a mobile home with roommates and was independent with ADLs and IADLs.    During OT eval, pt demo ability to complete ADLs, functional mobility, and txfs with supv using IV pole. Pt had no LOB. Pt politely declined need to complete toileting ADLs during session as he has been getting up frequently with nursing staff. Pt reported that due to his enlarged scrotum, he utilizes the grab bar to help control his descent and sitting on the toilet is not comfortable and has to do a lot adjusting to improve comfort. Nursing staff confirmed. While completing OOB ADLs, pt demo good insight and was able to verbalizing need for rest break to complete pursed lip breathing to regain breath. Recommended shower chair to improve safety with showering; pt minimally receptive. Pt reported no additional concerns regarding ability to safely complete ADLs and txfs independently after DC. Pt has no further acute OT needs and anticipate he will have no further OT needs after DC. Recommend that pt continue to complete OOB ADLs with nursing staff at least 3x/day to reduce risk of deconditioning.     Patient will not be actively followed for occupational therapy services at this  time, however may be seen if requested by physician for 1 more visit within 30 days to address any discharge or equipment needs.     Plan    Occupational Therapy Initial Treatment Plan   Duration: Discharge Needs Only    DC Equipment Recommendations: Tub / Shower Seat  Discharge Recommendations: Anticipate that the patient will have no further occupational therapy needs after discharge from the hospital      Objective      Prior Living Situation   Prior Services Home-Independent   Housing / Facility Mobile Home   Steps Into Home 4   Steps In Home 0   Rail Both Rail (Steps into Home)   Bathroom Set up Walk In Shower   Equipment Owned Front-Wheel Walker   Lives with - Patient's Self Care Capacity Unrelated Adult   Comments Pt currently resides with roommates who can provide assist if needed, but pt would prefer not to ask them. Pt reports that he has friends who live local who can assist as needed.   Prior Level of ADL Function   Self Feeding Independent   Grooming / Hygiene Independent   Bathing Independent   Dressing Independent   Toileting Independent   Prior Level of IADL Function   Medication Management Independent   Laundry Independent   Kitchen Mobility Independent   Finances Independent   Home Management Independent   Shopping Independent   Prior Level Of Mobility Independent Without Device in Community;Independent Without Device in Home   Driving / Transportation Walks;Relatives / Others Provide Transportation  (Friends will provide rides to longer distances such as the laundry mat)   Occupation (Pre-Hospital Vocational) Not Employed   Precautions   Precautions Fall Risk   Comments Wound vac   Vitals   O2 Delivery Device None - Room Air   Vitals Comments Pt demo good insight and was able to verbalizing need for rest break when completing OOB ADLs to complete pursed lip breathing to regain breath. Denies using O2 at baseline.   Pain 0 - 10 Group   Therapist Pain Assessment Post Activity Pain Same as Prior to  Activity;Nurse Notified  (Not rated, reported a mild increase in scrotal pain with activity)   Cognition    Cognition / Consciousness WDL   Level of Consciousness Alert   Comments Pleasant, cooperative, and receptive to education   Active ROM Upper Body   Active ROM Upper Body  WDL   Dominant Hand Right   Comments Observed during functional activities   Strength Upper Body   Upper Body Strength  WDL   Balance Assessment   Sitting Balance (Static) Fair +   Sitting Balance (Dynamic) Fair +   Standing Balance (Static) Fair +   Standing Balance (Dynamic) Fair   Weight Shift Sitting Fair   Weight Shift Standing Fair   Comments w/IV pole, no LOB   Bed Mobility    Comments Up to chair pre and post   ADL Assessment   Eating Modified Independent   Grooming Supervision;Standing   Upper Body Dressing Supervision   Lower Body Dressing Supervision  (Don slip on shoes; does not wear socks at baseline)   Toileting   (NT; declined need during session. Reports that he has been up frequently with nursing staff using restroom. Reported no concerns regarding ability to complete toileting ADLs and txfs.)   Comments Pt reported that due to his enlarged scrotum, sitting on the toilet is not comfortable and that he has to do a lot adjusting to improve comfort.   How much help from another person does the patient currently need...   6 Clicks Daily Activity Score 21   Functional Mobility   Sit to Stand Supervised   Bed, Chair, Wheelchair Transfer Supervised   Toilet Transfers   (NT; declined need during session. Reports that he has been up frequently with nursing staff using restroom. Reported no concerns regarding ability to complete txfs.)   Mobility Functional mobility in room and hallway (to assess endurance for household distances), use of IV pole, no LOB   Comments Pt reports that he uses the grab bar to help him sit on the toilet, but states that he has a countertop next to his toilet to help give him a boost if needed.   Activity  Tolerance   Sitting in Chair Up to chair pre and post   Standing 10 min   Short Term Goals   Short Term Goal # 1 Pt will have no further acute OT needs at NH   Education Group   Education Provided Role of Occupational Therapist;Adaptive Equipment;Pathology of bedrest;Energy Conservation   Role of Occupational Therapist Patient Response Patient;Acceptance;Explanation;Verbal Demonstration   Energy Conservation Patient Response Patient;Acceptance;Explanation;Verbal Demonstration  (Taking frequent rest breaks and gradually increasing intensity of activity over time)   Adaptive Equipment Patient Response Patient;Acceptance;Explanation;Verbal Demonstration  (DME recommendations to improve safety with showering. Pt politely declined need for shower chair.)   Pathology of Bedrest Patient Response Patient;Acceptance;Explanation;Verbal Demonstration  (Importance of OOB ADLs to reduce risk of deconditioning and assist with lung function.)

## 2024-06-03 NOTE — WOUND TEAM
[facility]  Inpatient Services  Initial Wound and Skin Care Evaluation    Admission Date: 5/30/2024     Last order of IP CONSULT TO WOUND CARE was found on 5/30/2024 from Hospital Encounter on 5/30/2024     HPI, PMH, SH: Reviewed    Past Surgical History:   Procedure Laterality Date    FEMORAL FEMORAL BYPASS N/A 5/31/2024    Procedure: REMOVAL OF FEMORAL-FEMORAL BYPASS GRAFT;  Surgeon: Vishal Alcantara M.D.;  Location: SURGERY [facility];  Service: Vascular    ANGIOGRAM N/A 5/31/2024    Procedure: ANGIOGRAM WITH STENT PLACEMENT;  Surgeon: Vishal Alcantara M.D.;  Location: SURGERY [facility];  Service: Vascular    FLAP GRAFT Right 5/31/2024    Procedure: RIGHT SARTORIUS MUSCLE GRAFT;  Surgeon: Vishal Alcantara M.D.;  Location: SURGERY [facility];  Service: Vascular    APPLICATION OR REPLACEMENT, WOUND VAC Bilateral 5/31/2024    Procedure: APPLICATION OR REPLACEMENT, WOUND VAC;  Surgeon: Vishal Alcantara M.D.;  Location: SURGERY [facility];  Service: Vascular    ABDOMINAL AORTIC ANEURYSM Bilateral 5/21/2024    Procedure: AORTIC DISSECTION REPAIR;  Surgeon: Gagandeep Arnett M.D.;  Location: SURGERY [facility];  Service: General    ECHOCARDIOGRAM, TRANSESOPHAGEAL, INTRAOPERATIVE N/A 5/21/2024    Procedure: ECHOCARDIOGRAM, TRANSESOPHAGEAL, INTRAOPERATIVE;  Surgeon: Gagandeep Arnett M.D.;  Location: SURGERY [facility];  Service: General    FEMORAL FEMORAL BYPASS Bilateral 5/21/2024    Procedure: CREATION, BYPASS, ARTERIAL, FEMORAL TO FEMORAL, USING GRAFT;  Surgeon: Gagandeep Arnett M.D.;  Location: SURGERY [facility];  Service: General     Social History     Tobacco Use    Smoking status: Every Day     Current packs/day: 5.00     Average packs/day: 5.0 packs/day (0.2 ttl pk-yrs)     Types: Cigarettes     Start date: 5/20/2024    Smokeless tobacco: Former    Tobacco comments:     Pt reports 1-2 cigs per day.    Substance Use Topics    Alcohol use: Yes     Comment: OCC beer     Chief Complaint    Patient presents with    Chest Pain     Initial complaint.  Currently 0/10.     Shortness of Breath    Dizziness    Groin Pain     With testicular edema.    Wound Infection     Possible, at surgical site supra pubic region.       Diagnosis: Wound infection [T14.8XXA, L08.9]    Unit where seen by Wound Team: T707/02     WOUND CONSULT RELATED TO:  bilateral groin    WOUND TEAM PLAN OF CARE - Frequency of Follow-up:   Nursing to follow dressing orders written for wound care. Contact wound team if area fails to progress, deteriorates or with any questions/concerns if something comes up before next scheduled follow up (See below as to whether wound is following and frequency of wound follow up)   NPWT change 3 times weekly - bilateral groin    WOUND HISTORY:   Patient had an aortic dissection and surgeons were able to repair.  Bilateral angioplasty and stent placement of common iliac.  Patient discharged home, and returned with groin site infections.  Patient taken to surgery with Dr. Alcantara on 5/31/24 and placed wound vac.           WOUND ASSESSMENT/LDA  Negative Pressure Wound Therapy 05/31/24 Surgical Groin Right (Active)   Placement Date/Time: 05/31/24 (c) 1000   Inserted by: OR  Wound Type: Surgical  Location: Groin  Laterality: Right      Assessments 6/3/2024 12:00 PM   Wound Image     NPWT Pump Mode / Pressure Setting 125 mmHg   Dressing Type Medium;Black Foam (Veraflo)   Number of Foam Pieces Used 2   Canister Changed No   NEXT Dressing Change/Treatment Date 06/05/24   VAC VeraFlo Irrigant 1/4 Strength Dakins   VAC VeraFlo Soak Time (mins) 5   VAC VeraFlo Instill Volume (ml) 30   VAC VeraFlo - Therapy Time (hrs) 2   VAC VeraFlo Pressure (mm/Hg) 125 mmHg       Wound 06/03/24 Full Thickness Wound Groin Right (Active)   Date First Assessed/Time First Assessed: 06/03/24 1232   Present on Original Admission: No  Hand Hygiene Completed: Yes  Primary Wound Type: Full Thickness Wound  Location: Groin  Laterality:  Right      Assessments 6/3/2024 12:00 PM   Wound Image      Site Assessment Red;Tan;Yellow   Periwound Assessment Intact   Margins Defined edges;Unattached edges   Closure Secondary intention   Drainage Amount Small   Drainage Description Serosanguineous   Treatments Cleansed;Nonselective debridement;Site care;Offloading   Wound Cleansing Approved Wound Cleanser   Periwound Protectant No-sting Skin Prep;Paste Ring;Drape   Dressing Status Clean;Dry;Intact   Dressing Changed New   Dressing Cleansing/Solutions 1/4 Strength Dakin's Solution   Dressing Options Wound Vac;Offloading Dressing - Sacral   Dressing Change/Treatment Frequency Monday, Wednesday, Friday, and As Needed   NEXT Dressing Change/Treatment Date 06/05/24   NEXT Weekly Photo (Inpatient Only) 06/10/24   Wound Team Following 3x Weekly   Non-staged Wound Description Full thickness   Wound Length (cm) 3 cm   Wound Width (cm) 12.5 cm   Wound Depth (cm) 4 cm   Wound Surface Area (cm^2) 37.5 cm^2   Wound Volume (cm^3) 150 cm^3   Shape oval   Wound Odor None   WOUND NURSE ONLY - Time Spent with Patient (mins) 60       Wound 06/03/24 Full Thickness Wound Groin Left (Active)   Date First Assessed/Time First Assessed: 06/03/24 1233   Present on Original Admission: No  Hand Hygiene Completed: Yes  Primary Wound Type: Full Thickness Wound  Location: Groin  Laterality: Left      Assessments 6/3/2024 12:00 PM   Wound Image      Site Assessment Red;Tan;Yellow   Periwound Assessment Intact   Margins Defined edges;Unattached edges   Closure Secondary intention   Drainage Amount Small   Drainage Description Serosanguineous   Treatments Cleansed;Nonselective debridement;Site care;Offloading   Wound Cleansing Approved Wound Cleanser   Periwound Protectant No-sting Skin Prep;Paste Ring;Drape   Dressing Status Clean;Dry;Intact   Dressing Changed New   Dressing Cleansing/Solutions 1/4 Strength Dakin's Solution   Dressing Options Wound Vac;Offloading Dressing - Sacral   Dressing  Change/Treatment Frequency Monday, Wednesday, Friday, and As Needed   NEXT Dressing Change/Treatment Date 06/05/24   NEXT Weekly Photo (Inpatient Only) 06/10/24   Wound Team Following 3x Weekly   Non-staged Wound Description Full thickness   Wound Length (cm) 4 cm   Wound Width (cm) 11 cm   Wound Depth (cm) 3.5 cm   Wound Surface Area (cm^2) 44 cm^2   Wound Volume (cm^3) 154 cm^3   Tunneling (cm) 3.5 cm   Tunneling Clock Position of Wound 2 o'clock   Shape oval   Wound Odor None       Negative Pressure Wound Therapy 06/03/24 Groin Left (Active)   Placement Date/Time: 06/03/24 1233   Present on Original Admission: No  Hand Hygiene Completed: Yes  Location: Groin  Laterality: Left      Assessments 6/3/2024 12:00 PM   Wound Image     NPWT Pump Mode / Pressure Setting 125 mmHg   Dressing Type Black Foam (Veraflo)   Number of Foam Pieces Used 2   Canister Changed No   NEXT Dressing Change/Treatment Date 06/05/24   VAC VeraFlo Irrigant 1/4 Strength Dakins   VAC VeraFlo Soak Time (mins) 5   VAC VeraFlo Instill Volume (ml) 26   VAC VeraFlo - Therapy Time (hrs) 2   VAC VeraFlo Pressure (mm/Hg) 125 mmHg                      Vascular:    NIRAJ:   No results found.    Lab Values:    Lab Results   Component Value Date/Time    WBC 7.3 06/03/2024 05:27 AM    RBC 3.96 (L) 06/03/2024 05:27 AM    HEMOGLOBIN 12.9 (L) 06/03/2024 05:27 AM    HEMATOCRIT 38.9 (L) 06/03/2024 05:27 AM    SEDRATEWES 16 05/31/2024 12:27 AM    HBA1C 5.0 05/30/2024 01:05 PM         Culture Results show:  Recent Results (from the past 720 hour(s))   CULTURE WOUND W/ GRAM STAIN    Collection Time: 05/30/24  4:35 PM    Specimen: Exudate; Wound   Result Value Ref Range    Significant Indicator POS (POS)     Source WND     Site LLQ Abdomen     Culture Result Light growth usual skin eliza. (A)     Gram Stain Result Many WBCs.  No organisms seen.       Culture Result Corynebacterium amycolatum  Moderate growth   (A)        Pain Level/Medicated:  PO pain medications  administered by bedside RN 1 hour  prior and IV pain medications administered by bedside RN 10 minutes prior (Pt educated that IV medication will not be available on outpatient basis)       INTERVENTIONS BY WOUND TEAM:  Chart and images reviewed. Discussed with bedside RN. All areas of concern (based on picture review, LDA review and discussion with bedside RN) have been thoroughly assessed. Documentation of areas based on significant findings. This RN in to assess patient. Performed standard wound care which includes appropriate positioning, dressing removal and non-selective debridement. Pictures and measurements obtained weekly if/when required.    Wound:  R. Groin   Cleansed/Non-selectively Debrided with:  Wound cleanser and Gauze  Michell wound: Cleansed with Wound cleanser and Gauze, Prepped with No Sting, Paste Rings, and Drape  Primary Dressing:  Black VF tucked into wound bed  Secondary (Outer) Dressing: secured with drape and offloaded with adhesive foam     Wound:  L. Groin   Cleansed/Non-selectively Debrided with:  Wound cleanser and Gauze  Michell wound: Cleansed with Wound cleanser and Gauze, Prepped with No Sting, Paste Rings, and Drape  Primary Dressing:  Black VF tucked into wound bed  Secondary (Outer) Dressing: secured with drape and offloaded with adhesive foam.     Advanced Wound Care Discharge Planning  Number of Clinicians necessary to complete wound care: 1  Is patient requiring IV pain medications for dressing changes:  Yes  Length of time for dressing change 60 min. (This does not include chart review, pre-medication time, set up, clean up or time spent charting.)    Interdisciplinary consultation: Patient, Bedside RN , N/A.  Pressure injury and staging reviewed with N/A.    EVALUATION / RATIONALE FOR TREATMENT:     Date:  06/03/24  Wound Status:  Initial evaluation    Bilateral groins are full thickness, adipose tissue present.   VF NPWT applied to assist with mechanical debridement, cleansing  the wound bed, increase oxygenation and granulation to the area and healing the wound by secondary intention.       Bilateral heels: intact offloaded with adhesive foam  Sacrum: intact            Goals: Steady decrease in wound area and depth weekly.    NURSING PLAN OF CARE ORDERS:  Dressing changes: See Dressing Care orders  Skin care: See Skin Care orders  RN Prevention Protocol    NUTRITION RECOMMENDATIONS   Wound Team Recommendations:  Dietary consult    DIET ORDERS (From admission to next 24h)       Start     Ordered    05/31/24 1623  Diet Order Diet: 2 Gram Sodium  ALL MEALS        Question:  Diet:  Answer:  2 Gram Sodium    05/31/24 1622                    PREVENTATIVE INTERVENTIONS:    Q shift Tano - performed per nursing policy  Q shift pressure point assessments - performed per nursing policy    Surface/Positioning  Standard/trauma mattress - Currently in Place  Reposition q 2 hours - Ordered  TAPs Turning system - Ordered    Offloading/Redistribution  Heel offloading dressing (Silicone dressing) - Applied this Visit      Mobilization      Pivoting to bedside stand by assist       Anticipated discharge plans:  TBD        Vac Discharge Needs:  Vac Discharge plan is purely a recommendation from wound team and not a requirement for discharge unless otherwise stated by physician.  Veraflo Vac while inpatient, ok to transition to Regular Vac on discharge

## 2024-06-04 LAB
ANION GAP SERPL CALC-SCNC: 9 MMOL/L (ref 7–16)
BACTERIA BLD CULT: NORMAL
BACTERIA BLD CULT: NORMAL
BASOPHILS # BLD AUTO: 0.9 % (ref 0–1.8)
BASOPHILS # BLD: 0.07 K/UL (ref 0–0.12)
BUN SERPL-MCNC: 19 MG/DL (ref 8–22)
CALCIUM SERPL-MCNC: 8 MG/DL (ref 8.5–10.5)
CHLORIDE SERPL-SCNC: 102 MMOL/L (ref 96–112)
CO2 SERPL-SCNC: 22 MMOL/L (ref 20–33)
CREAT SERPL-MCNC: 1.08 MG/DL (ref 0.5–1.4)
EOSINOPHIL # BLD AUTO: 0.4 K/UL (ref 0–0.51)
EOSINOPHIL NFR BLD: 5.4 % (ref 0–6.9)
ERYTHROCYTE [DISTWIDTH] IN BLOOD BY AUTOMATED COUNT: 49 FL (ref 35.9–50)
GFR SERPLBLD CREATININE-BSD FMLA CKD-EPI: 82 ML/MIN/1.73 M 2
GLUCOSE SERPL-MCNC: 99 MG/DL (ref 65–99)
HCT VFR BLD AUTO: 37.8 % (ref 42–52)
HGB BLD-MCNC: 12.5 G/DL (ref 14–18)
IMM GRANULOCYTES # BLD AUTO: 0.08 K/UL (ref 0–0.11)
IMM GRANULOCYTES NFR BLD AUTO: 1.1 % (ref 0–0.9)
LYMPHOCYTES # BLD AUTO: 1.15 K/UL (ref 1–4.8)
LYMPHOCYTES NFR BLD: 15.6 % (ref 22–41)
MCH RBC QN AUTO: 32.5 PG (ref 27–33)
MCHC RBC AUTO-ENTMCNC: 33.1 G/DL (ref 32.3–36.5)
MCV RBC AUTO: 98.2 FL (ref 81.4–97.8)
MONOCYTES # BLD AUTO: 0.87 K/UL (ref 0–0.85)
MONOCYTES NFR BLD AUTO: 11.8 % (ref 0–13.4)
NEUTROPHILS # BLD AUTO: 4.82 K/UL (ref 1.82–7.42)
NEUTROPHILS NFR BLD: 65.2 % (ref 44–72)
NRBC # BLD AUTO: 0 K/UL
NRBC BLD-RTO: 0 /100 WBC (ref 0–0.2)
PLATELET # BLD AUTO: 293 K/UL (ref 164–446)
PMV BLD AUTO: 9.5 FL (ref 9–12.9)
POTASSIUM SERPL-SCNC: 4.1 MMOL/L (ref 3.6–5.5)
RBC # BLD AUTO: 3.85 M/UL (ref 4.7–6.1)
SIGNIFICANT IND 70042: NORMAL
SIGNIFICANT IND 70042: NORMAL
SITE SITE: NORMAL
SITE SITE: NORMAL
SODIUM SERPL-SCNC: 133 MMOL/L (ref 135–145)
SOURCE SOURCE: NORMAL
SOURCE SOURCE: NORMAL
WBC # BLD AUTO: 7.4 K/UL (ref 4.8–10.8)

## 2024-06-04 PROCEDURE — A9270 NON-COVERED ITEM OR SERVICE: HCPCS | Performed by: SURGERY

## 2024-06-04 PROCEDURE — 85025 COMPLETE CBC W/AUTO DIFF WBC: CPT

## 2024-06-04 PROCEDURE — 99233 SBSQ HOSP IP/OBS HIGH 50: CPT | Performed by: HOSPITALIST

## 2024-06-04 PROCEDURE — 700111 HCHG RX REV CODE 636 W/ 250 OVERRIDE (IP): Mod: JZ | Performed by: HOSPITALIST

## 2024-06-04 PROCEDURE — A9270 NON-COVERED ITEM OR SERVICE: HCPCS | Performed by: INTERNAL MEDICINE

## 2024-06-04 PROCEDURE — 80048 BASIC METABOLIC PNL TOTAL CA: CPT

## 2024-06-04 PROCEDURE — 36415 COLL VENOUS BLD VENIPUNCTURE: CPT

## 2024-06-04 PROCEDURE — 770006 HCHG ROOM/CARE - MED/SURG/GYN SEMI*

## 2024-06-04 PROCEDURE — 700102 HCHG RX REV CODE 250 W/ 637 OVERRIDE(OP): Performed by: SURGERY

## 2024-06-04 PROCEDURE — 700102 HCHG RX REV CODE 250 W/ 637 OVERRIDE(OP): Performed by: INTERNAL MEDICINE

## 2024-06-04 PROCEDURE — 700102 HCHG RX REV CODE 250 W/ 637 OVERRIDE(OP): Performed by: HOSPITALIST

## 2024-06-04 PROCEDURE — 700111 HCHG RX REV CODE 636 W/ 250 OVERRIDE (IP): Performed by: INTERNAL MEDICINE

## 2024-06-04 PROCEDURE — 700111 HCHG RX REV CODE 636 W/ 250 OVERRIDE (IP): Performed by: SURGERY

## 2024-06-04 PROCEDURE — A9270 NON-COVERED ITEM OR SERVICE: HCPCS | Performed by: HOSPITALIST

## 2024-06-04 PROCEDURE — 700105 HCHG RX REV CODE 258: Performed by: INTERNAL MEDICINE

## 2024-06-04 RX ORDER — LABETALOL HYDROCHLORIDE 5 MG/ML
10 INJECTION, SOLUTION INTRAVENOUS EVERY 4 HOURS PRN
Status: DISCONTINUED | OUTPATIENT
Start: 2024-06-04 | End: 2024-06-11 | Stop reason: HOSPADM

## 2024-06-04 RX ORDER — CARVEDILOL 25 MG/1
25 TABLET ORAL 2 TIMES DAILY WITH MEALS
Status: DISCONTINUED | OUTPATIENT
Start: 2024-06-04 | End: 2024-06-11 | Stop reason: HOSPADM

## 2024-06-04 RX ORDER — HYDRALAZINE HYDROCHLORIDE 20 MG/ML
10 INJECTION INTRAMUSCULAR; INTRAVENOUS EVERY 4 HOURS PRN
Status: DISCONTINUED | OUTPATIENT
Start: 2024-06-04 | End: 2024-06-11 | Stop reason: HOSPADM

## 2024-06-04 RX ORDER — FUROSEMIDE 10 MG/ML
40 INJECTION INTRAMUSCULAR; INTRAVENOUS
Status: DISCONTINUED | OUTPATIENT
Start: 2024-06-05 | End: 2024-06-09

## 2024-06-04 RX ADMIN — FUROSEMIDE 40 MG: 10 INJECTION INTRAMUSCULAR; INTRAVENOUS at 04:48

## 2024-06-04 RX ADMIN — ACETAMINOPHEN 1000 MG: 500 TABLET, FILM COATED ORAL at 04:49

## 2024-06-04 RX ADMIN — HEPARIN SODIUM 5000 UNITS: 5000 INJECTION, SOLUTION INTRAVENOUS; SUBCUTANEOUS at 21:16

## 2024-06-04 RX ADMIN — HEPARIN SODIUM 5000 UNITS: 5000 INJECTION, SOLUTION INTRAVENOUS; SUBCUTANEOUS at 04:48

## 2024-06-04 RX ADMIN — GABAPENTIN 100 MG: 100 CAPSULE ORAL at 18:15

## 2024-06-04 RX ADMIN — ACETAMINOPHEN 1000 MG: 500 TABLET, FILM COATED ORAL at 11:50

## 2024-06-04 RX ADMIN — VANCOMYCIN HYDROCHLORIDE 1250 MG: 5 INJECTION, POWDER, LYOPHILIZED, FOR SOLUTION INTRAVENOUS at 18:16

## 2024-06-04 RX ADMIN — FLUTICASONE FUROATE, UMECLIDINIUM BROMIDE AND VILANTEROL TRIFENATATE 1 PUFF: 100; 62.5; 25 POWDER RESPIRATORY (INHALATION) at 04:48

## 2024-06-04 RX ADMIN — AMLODIPINE BESYLATE 10 MG: 10 TABLET ORAL at 04:49

## 2024-06-04 RX ADMIN — LEVOTHYROXINE SODIUM 100 MCG: 0.1 TABLET ORAL at 04:49

## 2024-06-04 RX ADMIN — GABAPENTIN 100 MG: 100 CAPSULE ORAL at 04:49

## 2024-06-04 RX ADMIN — NICOTINE TRANSDERMAL SYSTEM 21 MG: 21 PATCH, EXTENDED RELEASE TRANSDERMAL at 04:48

## 2024-06-04 RX ADMIN — FAMOTIDINE 20 MG: 20 TABLET, FILM COATED ORAL at 04:49

## 2024-06-04 RX ADMIN — HYDROMORPHONE HYDROCHLORIDE 1 MG: 1 INJECTION, SOLUTION INTRAMUSCULAR; INTRAVENOUS; SUBCUTANEOUS at 05:42

## 2024-06-04 RX ADMIN — AMOXICILLIN AND CLAVULANATE POTASSIUM 1 TABLET: 875; 125 TABLET, FILM COATED ORAL at 18:15

## 2024-06-04 RX ADMIN — AMOXICILLIN AND CLAVULANATE POTASSIUM 1 TABLET: 875; 125 TABLET, FILM COATED ORAL at 04:49

## 2024-06-04 RX ADMIN — GABAPENTIN 100 MG: 100 CAPSULE ORAL at 11:50

## 2024-06-04 RX ADMIN — VANCOMYCIN HYDROCHLORIDE 1250 MG: 5 INJECTION, POWDER, LYOPHILIZED, FOR SOLUTION INTRAVENOUS at 04:51

## 2024-06-04 RX ADMIN — HEPARIN SODIUM 5000 UNITS: 5000 INJECTION, SOLUTION INTRAVENOUS; SUBCUTANEOUS at 13:53

## 2024-06-04 RX ADMIN — METHOCARBAMOL TABLETS 500 MG: 500 TABLET, COATED ORAL at 18:25

## 2024-06-04 RX ADMIN — ACETAMINOPHEN 1000 MG: 500 TABLET, FILM COATED ORAL at 18:15

## 2024-06-04 RX ADMIN — METHOCARBAMOL TABLETS 500 MG: 500 TABLET, COATED ORAL at 21:25

## 2024-06-04 RX ADMIN — METHOCARBAMOL TABLETS 500 MG: 500 TABLET, COATED ORAL at 13:00

## 2024-06-04 RX ADMIN — CARVEDILOL 25 MG: 25 TABLET, FILM COATED ORAL at 18:15

## 2024-06-04 RX ADMIN — METHOCARBAMOL TABLETS 500 MG: 500 TABLET, COATED ORAL at 09:13

## 2024-06-04 RX ADMIN — FUROSEMIDE 40 MG: 10 INJECTION INTRAMUSCULAR; INTRAVENOUS at 13:53

## 2024-06-04 RX ADMIN — OXYCODONE HYDROCHLORIDE 10 MG: 10 TABLET ORAL at 04:55

## 2024-06-04 ASSESSMENT — ENCOUNTER SYMPTOMS
ABDOMINAL PAIN: 0
PALPITATIONS: 0
DIARRHEA: 0
NAUSEA: 0
FEVER: 0
MYALGIAS: 1
NERVOUS/ANXIOUS: 0
SHORTNESS OF BREATH: 0
SENSORY CHANGE: 0

## 2024-06-04 ASSESSMENT — PAIN DESCRIPTION - PAIN TYPE
TYPE: ACUTE PAIN

## 2024-06-04 ASSESSMENT — LIFESTYLE VARIABLES: SUBSTANCE_ABUSE: 0

## 2024-06-04 ASSESSMENT — FIBROSIS 4 INDEX: FIB4 SCORE: 1.01

## 2024-06-04 NOTE — PROGRESS NOTES
Handoff report received from day shift nurse. Pt care assumed. Pt is currently resting in bed. POC discussed with Pt and Pt verbalizes no questions at this time. Pt is AAOx4, on RA, on Medical and VSS. Call light and belongings within reach, bed in lowest and locked position, and Pt educated on use of call light. Will continue to monitor.

## 2024-06-04 NOTE — DISCHARGE PLANNING
0917  Received Choice form at 0911  Agency/Facility Name: [facility], [facility]  Referral sent per Choice form @ 0917     1020  Agency/Facility Name: [facility]   Spoke To: Miguel   Outcome: Miguel called to notify DPA that pt does not LTC payer source and Pt is only 40% service connected with [facility] and will not pay for SNF.

## 2024-06-04 NOTE — PROGRESS NOTES
"Hospital Medicine Daily Progress Note    Date of Service  6/4/2024    Chief Complaint  Groin pain and sob    Hospital Course  Rene Kelley is a 53 y.o. male with morbid obesity, HTN, smoker, CALOS, aortic dissection s/p aortic stent graft, right exernal iliac stent placement and left common iliac. He had a left to right femoral-femoral artery bypass graft.  He was admitted with low grade fever and leg/scrotal swelling admitted 5/30/2024 with a concern of surgical groin wound site drainage/infection.  Vascular surgeon and ID were consulted. He had removal of infected F-F bypass and wound VAC placement. Culture grew out Corynebacterium. MRSA nares negative.     Interval Problem Update  6/4: Discuss antibiotics plan. He denies active methamphetamine abuse but aware no illicit drugs are recommended.  Cessation of tobacco for healing.      I have discussed this patient's plan of care and discharge plan at IDT rounds today with Case Management, Nursing, Nursing leadership, and other members of the IDT team.    Consultants/Specialty  infectious disease and vascular surgery    Code Status  Full Code    Disposition  The patient is not medically cleared for discharge to home or a post-acute facility.      I have placed the appropriate orders for post-discharge needs.    Review of Systems  Review of Systems   Constitutional:  Negative for fever and malaise/fatigue.   Respiratory:  Negative for shortness of breath.    Cardiovascular:  Negative for palpitations and leg swelling.   Gastrointestinal:  Negative for abdominal pain, diarrhea and nausea.   Genitourinary:  Negative for dysuria.   Musculoskeletal:  Positive for myalgias (bilateral groin sites but \"tolerable\").   Neurological:  Negative for sensory change.   Psychiatric/Behavioral:  Negative for substance abuse. The patient is not nervous/anxious.         Physical Exam  Temp:  [36.1 °C (97 °F)-36.7 °C (98.1 °F)] 36.5 °C (97.7 °F)  Pulse:  [67-71] 71  Resp:  [18] " 18  BP: (129-153)/(88-92) 129/92  SpO2:  [94 %-98 %] 94 %    Physical Exam  Vitals reviewed.   Constitutional:       Appearance: Normal appearance. He is obese. He is not diaphoretic.   HENT:      Head: Normocephalic and atraumatic.      Nose: Nose normal.      Mouth/Throat:      Mouth: Mucous membranes are moist.      Pharynx: No oropharyngeal exudate.   Eyes:      General: No scleral icterus.        Right eye: No discharge.         Left eye: No discharge.      Extraocular Movements: Extraocular movements intact.      Conjunctiva/sclera: Conjunctivae normal.   Cardiovascular:      Rate and Rhythm: Normal rate and regular rhythm.      Pulses:           Radial pulses are 2+ on the right side and 2+ on the left side.        Dorsalis pedis pulses are 2+ on the right side and 2+ on the left side.      Heart sounds: No murmur heard.  Pulmonary:      Effort: Pulmonary effort is normal. No respiratory distress.      Breath sounds: Normal breath sounds. No wheezing or rales.   Abdominal:      General: Bowel sounds are normal. There is no distension.      Palpations: Abdomen is soft.      Tenderness: There is no abdominal tenderness.   Musculoskeletal:         General: No swelling.      Cervical back: Neck supple. No muscular tenderness.      Right lower leg: No edema.      Left lower leg: No edema.   Lymphadenopathy:      Cervical: No cervical adenopathy.   Skin:     Coloration: Skin is not jaundiced or pale.      Comments: Bilateral groin with wound VAC   Neurological:      General: No focal deficit present.      Mental Status: He is alert and oriented to person, place, and time. Mental status is at baseline.      Cranial Nerves: No cranial nerve deficit.   Psychiatric:         Mood and Affect: Mood normal.         Behavior: Behavior normal.         Fluids    Intake/Output Summary (Last 24 hours) at 6/4/2024 2005  Last data filed at 6/4/2024 1900  Gross per 24 hour   Intake 1608 ml   Output 8250 ml   Net -6642 ml        Laboratory  Recent Labs     06/03/24 0527 06/04/24  0324   WBC 7.3 7.4   RBC 3.96* 3.85*   HEMOGLOBIN 12.9* 12.5*   HEMATOCRIT 38.9* 37.8*   MCV 98.2* 98.2*   MCH 32.6 32.5   MCHC 33.2 33.1   RDW 50.6* 49.0   PLATELETCT 284 293   MPV 9.6 9.5     Recent Labs     06/03/24 0527 06/04/24 0324   SODIUM 137 133*   POTASSIUM 3.8 4.1   CHLORIDE 103 102   CO2 24 22   GLUCOSE 94 99   BUN 19 19   CREATININE 1.33 1.08   CALCIUM 8.2* 8.0*                   Imaging  EC-ECHOCARDIOGRAM COMPLETE W/ CONT   Final Result      EC-ECHOCARDIOGRAM COMPLETE W/O CONT         DX-CHEST-PORTABLE (1 VIEW)   Final Result      1.  Trace LEFT pleural effusion   2.  Decreased LEFT peripheral pulmonary opacity which could be atelectasis or pneumonia      CT-CTA COMPLETE THORACOABDOMINAL AORTA   Final Result      1.  Type B aortic dissection status post thoracic stent graft. Dissection flap is unchanged extending from just beyond the left subclavian artery origin into the bilateral external iliac arteries.   2.  Proximal descending thoracic aortic aneurysm measuring 4.5 x 4.5 cm.   3.  Status post bilateral common iliac artery stent placement and femorofemoral bypass. Stents and distal femoral arteries appear patent.   4.  Mild stranding and multiple foci of gas in the lower anterior abdominal wall without focal drainable fluid collection just chest abscess.   5.  Redemonstrated extension of the abdominal aortic dissection into the celiac artery which supplied by both the true and false lumen. Distal vessels appear patent.   6.  Diffuse bilateral tree-in-bud opacities which could be seen in setting edema and/or infection.   7.  15 mm lingular pulmonary nodule.   8.  Trace left pleural effusion.   9.  Nonobstructive bilateral renal calculi.      Otilio Type B aortic dissection      Fleischner Society pulmonary nodule recommendations:   Low and High Risk: Consider CT at 3 months, PET/CT, or tissue sampling.      Low Risk - Minimal or absent  history of smoking and of other known risk factors.      High Risk - History of smoking or of other known risk factors.      Note: These recommendations do not apply to lung cancer screening, patients with immunosuppression, or patients with known primary cancer.      Fleischner Society 2017 Guidelines for Management of Incidentally Detected Pulmonary Nodules in Adults               IR-ABDOMINAL AORTA-WITH RUNOFF    (Results Pending)        Assessment/Plan  * Wound infection- (present on admission)  Assessment & Plan  Pustular drainage from his groin wound  Staples removed in the ER  Pain control  Vancomycin and augmentin  Follow-up on wound cultures and blood cultures  Vascular surgery following and took patient to OR  Infectious disease consulting    Acute diastolic heart failure (HCC)  Assessment & Plan  Presents decompensated  IV Lasix 40 3x daily  As of 6/4 net negative 14/9 liters since admit.  Strict ins and outs and daily weights  Monitor on telemetry      Acute respiratory failure with hypoxia (HCC)  Assessment & Plan  On room air  Nocturnal bipap    Tobacco abuse- (present on admission)  Assessment & Plan  6/4 encouraged smoking cessation  Nicotine patch prn.      Acquired hypothyroidism- (present on admission)  Assessment & Plan  Continue levothyroxine  5/25/24 TSH:7.4 FT4:0.83  Synthroid 100mcg daily    Morbid obesity due to excess calories (HCC)- (present on admission)  Assessment & Plan  Body mass index is 47.23 kg/m².  Encourage future lifestyle modifications.    Dissection of aorta (HCC)- (present on admission)  Assessment & Plan  status post emergent endovascular repair of descending thoracic aortic type B dissection with placement of thoracic stent graft  Patient also underwent iliac stenting and femoral to femoral artery bypass  Pain control  Monitor blood pressure  He has multiple stents and graft placement.    Not on asa at this time.  Control lipids       Hypertension- (present on  admission)  Assessment & Plan  Uncontrolled  Continue home blood pressure medications  Amlodipine, coreg, lasix  Wean lasix based on renal function and vitals.         VTE prophylaxis:    heparin ppx      I have performed a physical exam and reviewed and updated ROS and Plan today (6/4/2024). In review of yesterday's note (6/3/2024), there are no changes except as documented above.

## 2024-06-04 NOTE — CARE PLAN
Problem: Care Map:  Day Before Discharge Optimal Outcome for the Heart Failure Patient  Goal: Day Before Discharge:  Optimal Care of the heart failure patient  Description: Target End Date:  Prior to discharge or change in level of care  Outcome: Progressing     Problem: Knowledge Deficit - Standard  Goal: Patient and family/care givers will demonstrate understanding of plan of care, disease process/condition, diagnostic tests and medications  Description: Target End Date:  1-3 days or as soon as patient condition allows    Document in Patient Education    1.  Patient and family/caregiver oriented to unit, equipment, visitation policy and means for communicating concern  2.  Complete/review Learning Assessment  3.  Assess knowledge level of disease process/condition, treatment plan, diagnostic tests and medications  4.  Explain disease process/condition, treatment plan, diagnostic tests and medications  Outcome: Progressing     Problem: Fall Risk  Goal: Patient will remain free from falls  Description: Target End Date:  Prior to discharge or change in level of care    Document interventions on the [facility] Fall Risk Assessment    1.  Assess for fall risk factors  2.  Implement fall precautions  Outcome: Progressing     Problem: Pain - Standard  Goal: Alleviation of pain or a reduction in pain to the patient’s comfort goal  Description: Target End Date:  Prior to discharge or change in level of care    Document on Vitals flowsheet    1.  Document pain using the appropriate pain scale per order or unit policy  2.  Educate and implement non-pharmacologic comfort measures (i.e. relaxation, distraction, massage, cold/heat therapy, etc.)  3.  Pain management medications as ordered  4.  Reassess pain after pain med administration per policy  5.  If opiods administered assess patient's response to pain medication is appropriate per POSS sedation scale  6.  Follow pain management plan developed in collaboration with  patient and interdisciplinary team (including palliative care or pain specialists if applicable)  Outcome: Progressing   The patient is Stable - Low risk of patient condition declining or worsening    Shift Goals  Clinical Goals: iv abx, wound vac management  Patient Goals: go home  Family Goals: STEVE    Progress made toward(s) clinical / shift goals:  VSS, safety    Patient is not progressing towards the following goals:

## 2024-06-04 NOTE — DISCHARGE PLANNING
Case Management Discharge Planning    Admission Date: 5/30/2024  GMLOS: 5.4  ALOS: 5    6-Clicks ADL Score: 21  6-Clicks Mobility Score: 20      Anticipated Discharge Dispo: Discharge Disposition: Discharged to home/self care (01)    DME Needed: No    Action(s) Taken: Updated Provider/Nurse on Discharge Plan, Choice obtained, and Referral(s) sent    Escalations Completed: None    Medically Clear: No    Next Steps: Pt needs SNF placement for wound vac care and IVABX. Discussed with pt. He agrees with plan. Consented for referral to [facility] contracted facilities except [place].    Barriers to Discharge: Medical clearance, Pending Placement, and Pending Insurance Authorization    Is the patient up for discharge tomorrow: No

## 2024-06-04 NOTE — PROGRESS NOTES
VASCULAR SURGERY PROGRESS NOTE      Awake, no complaints.    AF, 153/91.     General: Morbidly obese male in no apparent distress.  Abdomen: Soft, nondistended, obese.  Lower extremities, Veraflo's in place, no bleeding.  Feet are warm, well-perfused.  2+ edema.     Labs: Reviewed.  Images of groin wounds - Reviewed.  Small amount of fibrinous tissues.     Assessment: Status post removal of infected femoral-femoral bypass, angiogram with stent placement, right sartorius muscle flap, and wound VAC placement.     Plan:  Doing well.  Continue Veraflo.  Discharge planning. Will need home wound VAC.  Blood pressure control.  Keep systolic blood pressure 140 or less.  I increased Coreg to 25mg BID.  I also added prn Labetalol and Hydralazine.     Discussed with patient.  All questions were answered.     Appreciate Dr. Vazquez's management of this patient.

## 2024-06-05 LAB
ANION GAP SERPL CALC-SCNC: 10 MMOL/L (ref 7–16)
BASOPHILS # BLD AUTO: 1 % (ref 0–1.8)
BASOPHILS # BLD: 0.07 K/UL (ref 0–0.12)
BUN SERPL-MCNC: 20 MG/DL (ref 8–22)
CALCIUM SERPL-MCNC: 8.5 MG/DL (ref 8.5–10.5)
CHLORIDE SERPL-SCNC: 102 MMOL/L (ref 96–112)
CO2 SERPL-SCNC: 22 MMOL/L (ref 20–33)
CREAT SERPL-MCNC: 1.07 MG/DL (ref 0.5–1.4)
EOSINOPHIL # BLD AUTO: 0.34 K/UL (ref 0–0.51)
EOSINOPHIL NFR BLD: 4.9 % (ref 0–6.9)
ERYTHROCYTE [DISTWIDTH] IN BLOOD BY AUTOMATED COUNT: 48.8 FL (ref 35.9–50)
GFR SERPLBLD CREATININE-BSD FMLA CKD-EPI: 83 ML/MIN/1.73 M 2
GLUCOSE SERPL-MCNC: 94 MG/DL (ref 65–99)
HCT VFR BLD AUTO: 37.5 % (ref 42–52)
HGB BLD-MCNC: 12.8 G/DL (ref 14–18)
IMM GRANULOCYTES # BLD AUTO: 0.07 K/UL (ref 0–0.11)
IMM GRANULOCYTES NFR BLD AUTO: 1 % (ref 0–0.9)
LYMPHOCYTES # BLD AUTO: 0.98 K/UL (ref 1–4.8)
LYMPHOCYTES NFR BLD: 14 % (ref 22–41)
MCH RBC QN AUTO: 32.9 PG (ref 27–33)
MCHC RBC AUTO-ENTMCNC: 34.1 G/DL (ref 32.3–36.5)
MCV RBC AUTO: 96.4 FL (ref 81.4–97.8)
MONOCYTES # BLD AUTO: 0.87 K/UL (ref 0–0.85)
MONOCYTES NFR BLD AUTO: 12.4 % (ref 0–13.4)
NEUTROPHILS # BLD AUTO: 4.66 K/UL (ref 1.82–7.42)
NEUTROPHILS NFR BLD: 66.7 % (ref 44–72)
NRBC # BLD AUTO: 0 K/UL
NRBC BLD-RTO: 0 /100 WBC (ref 0–0.2)
PLATELET # BLD AUTO: 301 K/UL (ref 164–446)
PMV BLD AUTO: 9.3 FL (ref 9–12.9)
POTASSIUM SERPL-SCNC: 4.1 MMOL/L (ref 3.6–5.5)
RBC # BLD AUTO: 3.89 M/UL (ref 4.7–6.1)
SODIUM SERPL-SCNC: 134 MMOL/L (ref 135–145)
VANCOMYCIN TROUGH SERPL-MCNC: 16.6 UG/ML (ref 10–20)
WBC # BLD AUTO: 7 K/UL (ref 4.8–10.8)

## 2024-06-05 PROCEDURE — 36415 COLL VENOUS BLD VENIPUNCTURE: CPT

## 2024-06-05 PROCEDURE — 700111 HCHG RX REV CODE 636 W/ 250 OVERRIDE (IP): Mod: JZ | Performed by: HOSPITALIST

## 2024-06-05 PROCEDURE — A9270 NON-COVERED ITEM OR SERVICE: HCPCS | Performed by: HOSPITALIST

## 2024-06-05 PROCEDURE — 770006 HCHG ROOM/CARE - MED/SURG/GYN SEMI*

## 2024-06-05 PROCEDURE — 80202 ASSAY OF VANCOMYCIN: CPT

## 2024-06-05 PROCEDURE — 700111 HCHG RX REV CODE 636 W/ 250 OVERRIDE (IP): Performed by: INTERNAL MEDICINE

## 2024-06-05 PROCEDURE — 700102 HCHG RX REV CODE 250 W/ 637 OVERRIDE(OP): Performed by: SURGERY

## 2024-06-05 PROCEDURE — 700105 HCHG RX REV CODE 258: Performed by: INTERNAL MEDICINE

## 2024-06-05 PROCEDURE — 700111 HCHG RX REV CODE 636 W/ 250 OVERRIDE (IP): Performed by: SURGERY

## 2024-06-05 PROCEDURE — A9270 NON-COVERED ITEM OR SERVICE: HCPCS | Performed by: INTERNAL MEDICINE

## 2024-06-05 PROCEDURE — 80048 BASIC METABOLIC PNL TOTAL CA: CPT

## 2024-06-05 PROCEDURE — A9270 NON-COVERED ITEM OR SERVICE: HCPCS | Performed by: SURGERY

## 2024-06-05 PROCEDURE — 700102 HCHG RX REV CODE 250 W/ 637 OVERRIDE(OP): Performed by: INTERNAL MEDICINE

## 2024-06-05 PROCEDURE — 97608 NEG PRS WND THER NDME>50SQCM: CPT

## 2024-06-05 PROCEDURE — 85025 COMPLETE CBC W/AUTO DIFF WBC: CPT

## 2024-06-05 PROCEDURE — 99233 SBSQ HOSP IP/OBS HIGH 50: CPT | Performed by: INTERNAL MEDICINE

## 2024-06-05 PROCEDURE — 700102 HCHG RX REV CODE 250 W/ 637 OVERRIDE(OP): Performed by: HOSPITALIST

## 2024-06-05 RX ADMIN — METHOCARBAMOL TABLETS 500 MG: 500 TABLET, COATED ORAL at 17:10

## 2024-06-05 RX ADMIN — HEPARIN SODIUM 5000 UNITS: 5000 INJECTION, SOLUTION INTRAVENOUS; SUBCUTANEOUS at 20:49

## 2024-06-05 RX ADMIN — AMOXICILLIN AND CLAVULANATE POTASSIUM 1 TABLET: 875; 125 TABLET, FILM COATED ORAL at 17:10

## 2024-06-05 RX ADMIN — CARVEDILOL 25 MG: 25 TABLET, FILM COATED ORAL at 17:10

## 2024-06-05 RX ADMIN — METHOCARBAMOL TABLETS 500 MG: 500 TABLET, COATED ORAL at 12:19

## 2024-06-05 RX ADMIN — ACETAMINOPHEN 1000 MG: 500 TABLET, FILM COATED ORAL at 12:19

## 2024-06-05 RX ADMIN — GABAPENTIN 100 MG: 100 CAPSULE ORAL at 17:10

## 2024-06-05 RX ADMIN — GABAPENTIN 100 MG: 100 CAPSULE ORAL at 12:19

## 2024-06-05 RX ADMIN — FAMOTIDINE 20 MG: 20 TABLET, FILM COATED ORAL at 05:47

## 2024-06-05 RX ADMIN — FLUTICASONE FUROATE, UMECLIDINIUM BROMIDE AND VILANTEROL TRIFENATATE 1 PUFF: 100; 62.5; 25 POWDER RESPIRATORY (INHALATION) at 06:00

## 2024-06-05 RX ADMIN — CARVEDILOL 25 MG: 25 TABLET, FILM COATED ORAL at 08:04

## 2024-06-05 RX ADMIN — HEPARIN SODIUM 5000 UNITS: 5000 INJECTION, SOLUTION INTRAVENOUS; SUBCUTANEOUS at 05:47

## 2024-06-05 RX ADMIN — ACETAMINOPHEN 1000 MG: 500 TABLET, FILM COATED ORAL at 00:23

## 2024-06-05 RX ADMIN — VANCOMYCIN HYDROCHLORIDE 1000 MG: 5 INJECTION, POWDER, LYOPHILIZED, FOR SOLUTION INTRAVENOUS at 17:11

## 2024-06-05 RX ADMIN — LEVOTHYROXINE SODIUM 100 MCG: 0.1 TABLET ORAL at 05:47

## 2024-06-05 RX ADMIN — HYDROMORPHONE HYDROCHLORIDE 1 MG: 1 INJECTION, SOLUTION INTRAMUSCULAR; INTRAVENOUS; SUBCUTANEOUS at 13:59

## 2024-06-05 RX ADMIN — FUROSEMIDE 40 MG: 10 INJECTION, SOLUTION INTRAMUSCULAR; INTRAVENOUS at 15:25

## 2024-06-05 RX ADMIN — AMLODIPINE BESYLATE 10 MG: 10 TABLET ORAL at 05:47

## 2024-06-05 RX ADMIN — METHOCARBAMOL TABLETS 500 MG: 500 TABLET, COATED ORAL at 08:04

## 2024-06-05 RX ADMIN — VANCOMYCIN HYDROCHLORIDE 1250 MG: 5 INJECTION, POWDER, LYOPHILIZED, FOR SOLUTION INTRAVENOUS at 06:09

## 2024-06-05 RX ADMIN — HEPARIN SODIUM 5000 UNITS: 5000 INJECTION, SOLUTION INTRAVENOUS; SUBCUTANEOUS at 13:03

## 2024-06-05 RX ADMIN — METHOCARBAMOL TABLETS 500 MG: 500 TABLET, COATED ORAL at 20:49

## 2024-06-05 RX ADMIN — FUROSEMIDE 40 MG: 10 INJECTION, SOLUTION INTRAMUSCULAR; INTRAVENOUS at 05:47

## 2024-06-05 RX ADMIN — OXYCODONE HYDROCHLORIDE 10 MG: 10 TABLET ORAL at 13:03

## 2024-06-05 RX ADMIN — ACETAMINOPHEN 1000 MG: 500 TABLET, FILM COATED ORAL at 05:47

## 2024-06-05 RX ADMIN — AMOXICILLIN AND CLAVULANATE POTASSIUM 1 TABLET: 875; 125 TABLET, FILM COATED ORAL at 05:46

## 2024-06-05 RX ADMIN — GABAPENTIN 100 MG: 100 CAPSULE ORAL at 05:46

## 2024-06-05 ASSESSMENT — COGNITIVE AND FUNCTIONAL STATUS - GENERAL
DRESSING REGULAR LOWER BODY CLOTHING: A LITTLE
SUGGESTED CMS G CODE MODIFIER MOBILITY: CJ
DAILY ACTIVITIY SCORE: 21
CLIMB 3 TO 5 STEPS WITH RAILING: A LITTLE
MOBILITY SCORE: 20
WALKING IN HOSPITAL ROOM: A LITTLE
STANDING UP FROM CHAIR USING ARMS: A LITTLE
MOVING TO AND FROM BED TO CHAIR: A LITTLE
TOILETING: A LITTLE
SUGGESTED CMS G CODE MODIFIER DAILY ACTIVITY: CJ
HELP NEEDED FOR BATHING: A LITTLE

## 2024-06-05 ASSESSMENT — PAIN DESCRIPTION - PAIN TYPE
TYPE: ACUTE PAIN

## 2024-06-05 ASSESSMENT — LIFESTYLE VARIABLES: SUBSTANCE_ABUSE: 0

## 2024-06-05 ASSESSMENT — ENCOUNTER SYMPTOMS
PALPITATIONS: 0
NAUSEA: 0
MYALGIAS: 1
SENSORY CHANGE: 0
NERVOUS/ANXIOUS: 0
ABDOMINAL PAIN: 0
DIARRHEA: 0
SHORTNESS OF BREATH: 0
FEVER: 0

## 2024-06-05 NOTE — PROGRESS NOTES
Received report and assumed care of patient at change of shift. Patient is A&Ox4, on RA, and reports no pain at this time. Patient assessment completed, bed in lowest position, and call light and personal belongings are within reach. Patient refuses bed alarm this AM, education provided regarding importance of bed alarm for patient safety, pt still refuses. Charge RN Paulo aware. Patient expressed no further needs at this time.

## 2024-06-05 NOTE — PROGRESS NOTES
Hospital Medicine Daily Progress Note    Date of Service  6/5/2024    Chief Complaint  Groin pain and sob    Hospital Course  Rene Kelley is a 53 y.o. male with morbid obesity, HTN, smoker, CALOS, aortic dissection s/p aortic stent graft, right exernal iliac stent placement and left common iliac. He had a left to right femoral-femoral artery bypass graft.  He was admitted with low grade fever and leg/scrotal swelling admitted 5/30/2024 with a concern of surgical groin wound site drainage/infection.  Vascular surgeon and ID were consulted. He had removal of infected F-F bypass and wound VAC placement. Culture grew out Corynebacterium. MRSA nares negative.     Interval Problem Update    Patient was seen and examined at bedside.  I have personally reviewed and interpreted vitals, labs, and imaging.    6/5.  Afebrile.  Mild hypertension.  On room air.  Patient feels little groggy.  Just received pain meds for recent dressing changes.  Denies fevers, chills, chest pains, shortness of breath.  Has some intermittent pain around his groin and his back.  Discussed with social work.  Plan for [facility] for [facility] and wound VAC.  Counseled on smoking cessation    I have discussed this patient's plan of care and discharge plan at IDT rounds today with Case Management, Nursing, Nursing leadership, and other members of the IDT team.    Consultants/Specialty  infectious disease and vascular surgery    Code Status  Full Code    Disposition  The patient is not medically cleared for discharge to home or a post-acute facility.  Anticipate discharge to: skilled nursing facility    I have placed the appropriate orders for post-discharge needs.    Review of Systems  Review of Systems   Constitutional:  Negative for fever and malaise/fatigue.   Respiratory:  Negative for shortness of breath.    Cardiovascular:  Negative for palpitations and leg swelling.   Gastrointestinal:  Negative for abdominal pain,  "diarrhea and nausea.   Genitourinary:  Negative for dysuria.   Musculoskeletal:  Positive for myalgias (bilateral groin sites but \"tolerable\").   Neurological:  Negative for sensory change.   Psychiatric/Behavioral:  Negative for substance abuse. The patient is not nervous/anxious.         Physical Exam  Temp:  [36.1 °C (97 °F)-37.2 °C (99 °F)] 36.4 °C (97.6 °F)  Pulse:  [65-75] 65  Resp:  [18-19] 18  BP: (129-153)/(81-92) 153/85  SpO2:  [93 %-98 %] 96 %    Physical Exam  Vitals reviewed.   Constitutional:       Appearance: Normal appearance. He is obese. He is not diaphoretic.   HENT:      Head: Normocephalic and atraumatic.      Nose: Nose normal.      Mouth/Throat:      Mouth: Mucous membranes are moist.      Pharynx: No oropharyngeal exudate.   Eyes:      General: No scleral icterus.        Right eye: No discharge.         Left eye: No discharge.      Extraocular Movements: Extraocular movements intact.      Conjunctiva/sclera: Conjunctivae normal.   Cardiovascular:      Rate and Rhythm: Normal rate and regular rhythm.      Pulses:           Radial pulses are 2+ on the right side and 2+ on the left side.        Dorsalis pedis pulses are 2+ on the right side and 2+ on the left side.      Heart sounds: No murmur heard.  Pulmonary:      Effort: Pulmonary effort is normal. No respiratory distress.      Breath sounds: Normal breath sounds. No wheezing or rales.   Abdominal:      General: Bowel sounds are normal. There is no distension.      Palpations: Abdomen is soft.      Tenderness: There is no abdominal tenderness.   Musculoskeletal:         General: No swelling.      Cervical back: Neck supple. No muscular tenderness.      Right lower leg: No edema.      Left lower leg: No edema.   Lymphadenopathy:      Cervical: No cervical adenopathy.   Skin:     Coloration: Skin is not jaundiced or pale.      Comments: Bilateral groin with wound VAC   Neurological:      General: No focal deficit present.      Mental Status: " He is alert and oriented to person, place, and time. Mental status is at baseline.      Cranial Nerves: No cranial nerve deficit.   Psychiatric:         Mood and Affect: Mood normal.         Behavior: Behavior normal.         Fluids    Intake/Output Summary (Last 24 hours) at 6/5/2024 0728  Last data filed at 6/5/2024 0609  Gross per 24 hour   Intake 1248 ml   Output 5325 ml   Net -4077 ml       Laboratory  Recent Labs     06/03/24 0527 06/04/24 0324 06/05/24  0548   WBC 7.3 7.4 7.0   RBC 3.96* 3.85* 3.89*   HEMOGLOBIN 12.9* 12.5* 12.8*   HEMATOCRIT 38.9* 37.8* 37.5*   MCV 98.2* 98.2* 96.4   MCH 32.6 32.5 32.9   MCHC 33.2 33.1 34.1   RDW 50.6* 49.0 48.8   PLATELETCT 284 293 301   MPV 9.6 9.5 9.3     Recent Labs     06/03/24 0527 06/04/24 0324 06/05/24  0548   SODIUM 137 133* 134*   POTASSIUM 3.8 4.1 4.1   CHLORIDE 103 102 102   CO2 24 22 22   GLUCOSE 94 99 94   BUN 19 19 20   CREATININE 1.33 1.08 1.07   CALCIUM 8.2* 8.0* 8.5                   Imaging  EC-ECHOCARDIOGRAM COMPLETE W/ CONT   Final Result      EC-ECHOCARDIOGRAM COMPLETE W/O CONT         DX-CHEST-PORTABLE (1 VIEW)   Final Result      1.  Trace LEFT pleural effusion   2.  Decreased LEFT peripheral pulmonary opacity which could be atelectasis or pneumonia      CT-CTA COMPLETE THORACOABDOMINAL AORTA   Final Result      1.  Type B aortic dissection status post thoracic stent graft. Dissection flap is unchanged extending from just beyond the left subclavian artery origin into the bilateral external iliac arteries.   2.  Proximal descending thoracic aortic aneurysm measuring 4.5 x 4.5 cm.   3.  Status post bilateral common iliac artery stent placement and femorofemoral bypass. Stents and distal femoral arteries appear patent.   4.  Mild stranding and multiple foci of gas in the lower anterior abdominal wall without focal drainable fluid collection just chest abscess.   5.  Redemonstrated extension of the abdominal aortic dissection into the celiac artery  which supplied by both the true and false lumen. Distal vessels appear patent.   6.  Diffuse bilateral tree-in-bud opacities which could be seen in setting edema and/or infection.   7.  15 mm lingular pulmonary nodule.   8.  Trace left pleural effusion.   9.  Nonobstructive bilateral renal calculi.      Otilio Type B aortic dissection      Fleischner Society pulmonary nodule recommendations:   Low and High Risk: Consider CT at 3 months, PET/CT, or tissue sampling.      Low Risk - Minimal or absent history of smoking and of other known risk factors.      High Risk - History of smoking or of other known risk factors.      Note: These recommendations do not apply to lung cancer screening, patients with immunosuppression, or patients with known primary cancer.      Fleischner Society 2017 Guidelines for Management of Incidentally Detected Pulmonary Nodules in Adults               IR-ABDOMINAL AORTA-WITH RUNOFF    (Results Pending)        Assessment/Plan  * Wound infection- (present on admission)  Assessment & Plan  6/5/2024  Pustular drainage from his groin wound  Staples removed in the ER  Pain control  Vancomycin and augmentin  Follow-up on wound cultures and blood cultures  Vascular surgery following and took patient to OR  Infectious disease consulting     Acute diastolic heart failure (HCC)  Assessment & Plan  6/5/2024  Presents decompensated  IV Lasix 40 3x daily  As of 6/4 net negative 14/9 liters since admit.  Strict ins and outs and daily weights  Monitor on telemetry    Acute respiratory failure with hypoxia (HCC)  Assessment & Plan  6/5/2024  On room air  Nocturnal bipap    Tobacco abuse- (present on admission)  Assessment & Plan  Spent approx 5 mins on Tobacco cessation education . Discussed options of nicotine patch, medical treatment with wellbutrin and chantix. Discussed the benefits of quitting smoking and risks of continued smoking including cardiovascular disease, cancer and COPD.   Code 77463  -I  have ordered nicotine replacement therapy    Acquired hypothyroidism- (present on admission)  Assessment & Plan  6/5/2024  Continue levothyroxine  5/25/24 TSH:7.4 FT4:0.83  Synthroid 100mcg daily    Morbid obesity due to excess calories (HCC)- (present on admission)  Assessment & Plan  Body mass index is 47.23 kg/m².  Encourage future lifestyle modifications.    Dissection of aorta (HCC)- (present on admission)  Assessment & Plan  6/5/2024  status post emergent endovascular repair of descending thoracic aortic type B dissection with placement of thoracic stent graft  Patient also underwent iliac stenting and femoral to femoral artery bypass  Pain control  Monitor blood pressure  He has multiple stents and graft placement.    Not on asa at this time.  Control lipids     Hypertension- (present on admission)  Assessment & Plan  6/5/2024  Uncontrolled  Continue home blood pressure medications  Amlodipine, coreg, lasix  Wean lasix based on renal function and vitals.         VTE prophylaxis: Heparin    I have performed a physical exam and reviewed and updated ROS and Plan today (6/5/2024). In review of yesterday's note (6/4/2024), there are no changes except as documented above.      Greater than 51 minutes spent prepping to see patient (e.g. review of tests) obtaining and/or reviewing separately obtained history. Performing a medically appropriate examination and/ evaluation.  Counseling and educating the patient/family/caregiver.  Ordering medications, tests, or procedures.  Referring and communicating with other health care professionals.  Documenting clinical information in EPIC.  Independently interpreting results and communicating results to patient/family/caregiver.  Care coordination.

## 2024-06-05 NOTE — PROGRESS NOTES
Pharmacy Vancomycin Kinetics Note for 6/5/2024     53 y.o. male on Vancomycin day # 7     Vancomycin Indication (AUC Dosing): Skin/skin structure infection    Provider specified end date: 06/27/24    Active Antibiotics (From admission, onward)      Ordered     Ordering Provider       Wed Jun 5, 2024  9:20 AM    06/05/24 0920  vancomycin (Vancocin) 1,000 mg in  mL IVPB  (vancomycin (VANCOCIN) IV (LD + Maintenance))  EVERY 12 HOURS        Note to Pharmacy: Per P&T vanco per pharmacy Protocol    Benny Stevens D.O.       Mon Himanshu 3, 2024  1:37 PM    06/03/24 1337  amoxicillin-clavulanate (Augmentin) 875-125 MG per tablet 1 Tablet  EVERY 12 HOURS         Domi Moss M.D.       Thu May 30, 2024  6:15 PM    05/30/24 1815  MD Alert...Vancomycin per Pharmacy  PHARMACY TO DOSE        Question:  Indication(s) for vancomycin?  Answer:  Skin and soft tissue infection    Jairo Adan M.D.            Dosing Weight: 145 kg (319 lb 10.7 oz)      Admission History: Admitted on 5/30/2024 for Wound infection [T14.8XXA, L08.9]  Pertinent history: Patient presenting with groin pain and supra pubic surgical site infection. Patient was recently discharged after Type B aortic dissection repair with right femoral-femoral artery bypass graft. Patient has extensive substance abuse history including meth. WBC noted to be elevated at 11.4. MD notes pustular drainage from groin wound. ID consulted recommending vanco for 4 weeks and then zyvox to 2 weeks.    Allergies:     Patient has no known allergies.     Pertinent cultures to date:     Results       Procedure Component Value Units Date/Time    CULTURE WOUND W/ GRAM STAIN [[id number]]  (Abnormal) Collected: 05/30/24 1635    Order Status: Completed Specimen: Wound from Exudate Updated: 06/05/24 0913     Significant Indicator POS     Source WND     Site LLQ Abdomen     Culture Result Light growth usual skin eliza.     Gram Stain Result Many WBCs.  No organisms seen.       Culture  Result Corynebacterium amycolatum  Moderate growth  Susceptibilities in progress      Blood Culture - Draw one from central line and one from peripheral site [[id number]] Collected: 05/30/24 1517    Order Status: Completed Specimen: Blood from Peripheral Updated: 06/04/24 1700     Significant Indicator NEG     Source BLD     Site PERIPHERAL     Culture Result No growth after 5 days of incubation.    Narrative:      Right Forearm/Arm    Blood Culture - Draw one from central line and one from peripheral site [[id number]] Collected: 05/30/24 1529    Order Status: Completed Specimen: Blood from Line Updated: 06/04/24 1700     Significant Indicator NEG     Source BLD     Site Peripheral     Culture Result No growth after 5 days of incubation.    Narrative:      Right Hand    Urine Culture (New) [[id number]] Collected: 05/30/24 2150    Order Status: Completed Specimen: Urine Updated: 06/01/24 2008     Significant Indicator NEG     Source UR     Site -     Culture Result No growth at 48 hours.    Narrative:      Indication for culture:->Emergency Room Patient    MRSA By PCR (Amp) [[id number]] Collected: 05/30/24 2150    Order Status: Completed Specimen: Respirate from Nares Updated: 05/30/24 2358     MRSA by PCR Negative    CoV-2, Flu A/B, And RSV by PCR ([facility] pteid) [[id number]] Collected: 05/30/24 2150    Order Status: Completed Specimen: Respirate Updated: 05/30/24 2303     Influenza virus A RNA Negative     Influenza virus B, PCR Negative     RSV, PCR Negative     SARS-CoV-2 by PCR NotDetected     Comment: [facility] providers: PLEASE REFER TO DE-ESCALATION AND RETESTING PROTOCOL  on [email or web address]    **The [employer] GeneXpert Xpress SARS-CoV-2 RT-PCR Test has been made  available for use under the Emergency Use Authorization (EUA) only.          SARS-CoV-2 Source NP Swab    GRAM STAIN [[id number]] Collected: 05/30/24 1635    Order Status: Completed Specimen: Wound Updated: 05/30/24 2236     Significant Indicator .     Source WND  "    Site LLQ Abdomen     Gram Stain Result Many WBCs.  No organisms seen.      Urinalysis [[id number]]  (Abnormal) Collected: 24    Order Status: Completed Specimen: Urine Updated: 24     Color Yellow     Character Clear     Specific Gravity 1.023     Ph 5.5     Glucose Negative mg/dL      Ketones Negative mg/dL      Protein Negative mg/dL      Bilirubin Negative     Urobilinogen, Urine 1.0     Nitrite Negative     Leukocyte Esterase Negative     Occult Blood Small     Micro Urine Req Microscopic            Labs:     Estimated Creatinine Clearance: 117.7 mL/min (by C-G formula based on SCr of 1.07 mg/dL).  Recent Labs     24   WBC 7.3 7.4 7.0   NEUTSPOLYS 69.80 65.20 66.70     Recent Labs     24   BUN 19 19 20   CREATININE 1.33 1.08 1.07       Intake/Output Summary (Last 24 hours) at 2024 0934  Last data filed at 2024 0609  Gross per 24 hour   Intake 888 ml   Output 5125 ml   Net -4237 ml      /80   Pulse 66   Temp 36.4 °C (97.5 °F) (Temporal)   Resp 17   Ht 1.778 m (5' 10\")   Wt (!) 149 kg (329 lb 2.4 oz)   SpO2 95%  Temp (24hrs), Av.7 °C (98 °F), Min:36.4 °C (97.5 °F), Max:37.2 °C (99 °F)      List concerns for Vancomycin clearance:     Obesity    Pharmacokinetics:     Trough kinetics:   Recent Labs     24  0548   VANCOTROUGH 16.6       A/P:     -  Vancomycin dose: Decrease dose from 1250mg q12hrs to 1000mg q12hrs     -  Next vancomycin level(s): 5-7 days or sooner if renal function declines     -  Comments: Repeat trough level this morning collected appropriately prior to morning dose. Trough level slightly elevated at 16.6mcg/mL. Patient likely accumulating due to body habitus. Will decrease dose further to 1000mg q12hrs to target a level of ~ 13mcg/mL. Renal function stable. Pharmacy will continue to monitor and readjust dosing if needed.     Layla Alexandra, PharmD    "

## 2024-06-05 NOTE — PROGRESS NOTES
4 Eyes Skin Assessment Completed by BEATA Rice and BEATA Loyola.    Head WDL  Ears WDL  Nose WDL  Mouth WDL  Neck WDL  Breast/Chest WDL  Shoulder Blades WDL  Spine WDL  (R) Arm/Elbow/Hand Bruising and Scab  (L) Arm/Elbow/Hand Bruising and Scab  Abdomen WDL  Groin 2 wound vacs in place  Scrotum/Coccyx/Buttocks WDL  (R) Leg Swelling and Edema  (L) Leg Swelling and Edema  (R) Heel/Foot/Toe Redness  (L) Heel/Foot/Toe Redness          Devices In Places       Interventions In Place Heel Mepilex and Pillows    Possible Skin Injury No    Pictures Uploaded Into Epic N/A  Wound Consult Placed N/A  RN Wound Prevention Protocol Ordered No

## 2024-06-05 NOTE — PROGRESS NOTES
Report received from Laz at 2009.    Pt arrived to unit with transport. Pt came with belongings, including clothing, shoes, phone, and .  Patient is A&O x 4.  Patient is a standby/1x assist.  Call light is within reach. All questions answered at this time. Hourly rounding in place.

## 2024-06-05 NOTE — PROGRESS NOTES
VASCULAR SURGERY PROGRESS NOTE    Awake, no complaints.    AF, 137/80     General: Morbidly obese male in no apparent distress.  Abdomen: Soft, nondistended, obese.  Lower extremities, Veraflo's in place, no bleeding.  Feet are warm, well-perfused.  2+ edema.     Labs: Reviewed.       Assessment: Status post removal of infected femoral-femoral bypass, angiogram with stent placement, right sartorius muscle flap, and wound VAC placement.     Plan:  Doing well.  Change Veraflo to regular wound vac..  Discharge planning. Will need home wound VAC.  Blood pressure control.  Keep systolic blood pressure 140 or less.     Discussed with patient.  All questions were answered.     Appreciate hospitalist's and infectious disease service's management of this patient.

## 2024-06-05 NOTE — DISCHARGE PLANNING
Referral sent to [facility]    1154- Referral sent to [facility] and [facility]

## 2024-06-05 NOTE — DISCHARGE PLANNING
Case Management Discharge Planning    Admission Date: 5/30/2024  GMLOS: 5.4  ALOS: 6    6-Clicks ADL Score: 21  6-Clicks Mobility Score: 20    Anticipated Discharge Dispo: Discharge Disposition: D/T to SNF with Medicare cert in anticipation of skilled care (03)    DME Needed: No    Action(s) Taken:   CM RN received handoff report from previous CM RN.   CM RN requested DPA send referral to [facility]. CM RN attempted to contact Meaghan with the [facility] Admissions Coordinator. Left voicemail with direct contact information requesting call back.     1153  Pt discussed during IDT rounds. Per MD, pt not yet medically clear. Pt has 2 Veraflo wound VAC in place and is receiving IV ABX at this time.     CM RN requested DPA send referrals to LTACs.     1300  CM RN requested DPA follow up with pending referrals. No accepting facilities at this time.     1531  JAZZ RN received call from Meaghan with [facility] who stated pt is being tentatively accepted with bed availability on Friday 6/7/2024. Meaghan stated pt would require PICC line placement, negative COVD test within 48 hours prior to discharge, and to have IV lasix switched to PO in order to be accepted into facility.   MD informed and charge RN informed.     Escalations Completed: None    Medically Clear: No    Next Steps:  CM RN to follow up with medical team to discuss discharge barriers or needs.     Barriers to Discharge: Medical clearance and Pending Placement and Insurance Coverage.     Is the patient up for discharge tomorrow: No

## 2024-06-05 NOTE — CARE PLAN
The patient is Stable - Low risk of patient condition declining or worsening    Shift Goals  Clinical Goals: Patient will remain free from falls throughout shift.  Patient Goals: Pt will sleep comfortably throughout shift.  Family Goals: not present    Progress made toward(s) clinical / shift goals: Pt remained free from falls throughout shift. Pt sat EOB at least three times and ambulated to standing at least once this shift. Pt educated on the importance of mobility and ambulation.    Problem: Care Map:  Day Before Discharge Optimal Outcome for the Heart Failure Patient  Goal: Day Before Discharge:  Optimal Care of the heart failure patient  Outcome: Progressing     Problem: Knowledge Deficit - Standard  Goal: Patient and family/care givers will demonstrate understanding of plan of care, disease process/condition, diagnostic tests and medications  Outcome: Progressing     Problem: Fall Risk  Goal: Patient will remain free from falls  Outcome: Progressing     Problem: Pain - Standard  Goal: Alleviation of pain or a reduction in pain to the patient’s comfort goal  Outcome: Progressing       Patient is not progressing towards the following goals:

## 2024-06-05 NOTE — DISCHARGE PLANNING
Agency/Facility Name: [facility]  Plan or Request: JEAN CARLOS left  for Meaghan, asked for a return call.

## 2024-06-05 NOTE — PROGRESS NOTES
Received bedside report from BEATA Marx, pt care assumed. VS stable, Pt A&Ox4, c/o 0/10 pain at this time. Pt denies any additional needs at this time. Bed locked and in lowest position, bed alarm off, pt calls appropriately. Pt educated on fall risk and verbalized understanding, call light within reach, hourly rounding initiated.

## 2024-06-05 NOTE — CARE PLAN
The patient is Stable - Low risk of patient condition declining or worsening    Shift Goals  Clinical Goals: Free from falls, wound care  Patient Goals: Rest  Family Goals: NA    Progress made toward(s) clinical / shift goals:  Patient and family verbalized understanding of plan of care and education. Patient's pain was controlled with pharmacological and nonpharmacological comfort measures. Patient was free from falls and no changes in skin integrity were noticed during this shift.  Problem: Care Map:  Day 3 Optimal Outcome for the Heart Failure Patient  Goal: Day 3:  Optimal Care of the heart failure patient  Outcome: Progressing     Problem: Knowledge Deficit - Standard  Goal: Patient and family/care givers will demonstrate understanding of plan of care, disease process/condition, diagnostic tests and medications  Outcome: Progressing     Problem: Fall Risk  Goal: Patient will remain free from falls during this shift  Outcome: Progressing     Problem: Pain - Standard  Goal: Alleviation of pain or a reduction in pain to the patient’s comfort goal of 3/10 by the end of this shift  Outcome: Progressing       Patient is not progressing towards the following goals:

## 2024-06-06 ENCOUNTER — APPOINTMENT (OUTPATIENT)
Dept: RADIOLOGY | Facility: MEDICAL CENTER | Age: 54
DRG: 252 | End: 2024-06-06
Attending: INTERNAL MEDICINE
Payer: COMMERCIAL

## 2024-06-06 LAB
ANION GAP SERPL CALC-SCNC: 12 MMOL/L (ref 7–16)
BASOPHILS # BLD AUTO: 0.8 % (ref 0–1.8)
BASOPHILS # BLD: 0.06 K/UL (ref 0–0.12)
BUN SERPL-MCNC: 19 MG/DL (ref 8–22)
CALCIUM SERPL-MCNC: 8.6 MG/DL (ref 8.5–10.5)
CHLORIDE SERPL-SCNC: 101 MMOL/L (ref 96–112)
CO2 SERPL-SCNC: 22 MMOL/L (ref 20–33)
CREAT SERPL-MCNC: 1.02 MG/DL (ref 0.5–1.4)
EOSINOPHIL # BLD AUTO: 0.39 K/UL (ref 0–0.51)
EOSINOPHIL NFR BLD: 5.2 % (ref 0–6.9)
ERYTHROCYTE [DISTWIDTH] IN BLOOD BY AUTOMATED COUNT: 49.1 FL (ref 35.9–50)
FLUAV RNA SPEC QL NAA+PROBE: NEGATIVE
FLUBV RNA SPEC QL NAA+PROBE: NEGATIVE
GFR SERPLBLD CREATININE-BSD FMLA CKD-EPI: 88 ML/MIN/1.73 M 2
GLUCOSE SERPL-MCNC: 97 MG/DL (ref 65–99)
HCT VFR BLD AUTO: 39.3 % (ref 42–52)
HGB BLD-MCNC: 13.3 G/DL (ref 14–18)
IMM GRANULOCYTES # BLD AUTO: 0.06 K/UL (ref 0–0.11)
IMM GRANULOCYTES NFR BLD AUTO: 0.8 % (ref 0–0.9)
LYMPHOCYTES # BLD AUTO: 0.95 K/UL (ref 1–4.8)
LYMPHOCYTES NFR BLD: 12.5 % (ref 22–41)
MAGNESIUM SERPL-MCNC: 2 MG/DL (ref 1.5–2.5)
MCH RBC QN AUTO: 33.2 PG (ref 27–33)
MCHC RBC AUTO-ENTMCNC: 33.8 G/DL (ref 32.3–36.5)
MCV RBC AUTO: 98 FL (ref 81.4–97.8)
MONOCYTES # BLD AUTO: 0.97 K/UL (ref 0–0.85)
MONOCYTES NFR BLD AUTO: 12.8 % (ref 0–13.4)
NEUTROPHILS # BLD AUTO: 5.14 K/UL (ref 1.82–7.42)
NEUTROPHILS NFR BLD: 67.9 % (ref 44–72)
NRBC # BLD AUTO: 0 K/UL
NRBC BLD-RTO: 0 /100 WBC (ref 0–0.2)
PHOSPHATE SERPL-MCNC: 2.8 MG/DL (ref 2.5–4.5)
PLATELET # BLD AUTO: 285 K/UL (ref 164–446)
PMV BLD AUTO: 10 FL (ref 9–12.9)
POTASSIUM SERPL-SCNC: 4.3 MMOL/L (ref 3.6–5.5)
RBC # BLD AUTO: 4.01 M/UL (ref 4.7–6.1)
RSV RNA SPEC QL NAA+PROBE: NEGATIVE
SARS-COV-2 RNA RESP QL NAA+PROBE: NOTDETECTED
SODIUM SERPL-SCNC: 135 MMOL/L (ref 135–145)
SPECIMEN SOURCE: NORMAL
WBC # BLD AUTO: 7.6 K/UL (ref 4.8–10.8)

## 2024-06-06 PROCEDURE — A9270 NON-COVERED ITEM OR SERVICE: HCPCS | Performed by: SURGERY

## 2024-06-06 PROCEDURE — 85025 COMPLETE CBC W/AUTO DIFF WBC: CPT

## 2024-06-06 PROCEDURE — 700111 HCHG RX REV CODE 636 W/ 250 OVERRIDE (IP): Performed by: SURGERY

## 2024-06-06 PROCEDURE — 700102 HCHG RX REV CODE 250 W/ 637 OVERRIDE(OP): Performed by: INTERNAL MEDICINE

## 2024-06-06 PROCEDURE — 02HV33Z INSERTION OF INFUSION DEVICE INTO SUPERIOR VENA CAVA, PERCUTANEOUS APPROACH: ICD-10-PCS | Performed by: INTERNAL MEDICINE

## 2024-06-06 PROCEDURE — 36573 INSJ PICC RS&I 5 YR+: CPT

## 2024-06-06 PROCEDURE — 99233 SBSQ HOSP IP/OBS HIGH 50: CPT | Performed by: INTERNAL MEDICINE

## 2024-06-06 PROCEDURE — 84100 ASSAY OF PHOSPHORUS: CPT

## 2024-06-06 PROCEDURE — 90471 IMMUNIZATION ADMIN: CPT

## 2024-06-06 PROCEDURE — 36415 COLL VENOUS BLD VENIPUNCTURE: CPT

## 2024-06-06 PROCEDURE — 700105 HCHG RX REV CODE 258: Performed by: INTERNAL MEDICINE

## 2024-06-06 PROCEDURE — 770006 HCHG ROOM/CARE - MED/SURG/GYN SEMI*

## 2024-06-06 PROCEDURE — 700102 HCHG RX REV CODE 250 W/ 637 OVERRIDE(OP): Performed by: SURGERY

## 2024-06-06 PROCEDURE — 700111 HCHG RX REV CODE 636 W/ 250 OVERRIDE (IP): Performed by: HOSPITALIST

## 2024-06-06 PROCEDURE — A9270 NON-COVERED ITEM OR SERVICE: HCPCS | Performed by: HOSPITALIST

## 2024-06-06 PROCEDURE — 700111 HCHG RX REV CODE 636 W/ 250 OVERRIDE (IP): Mod: JZ | Performed by: HOSPITALIST

## 2024-06-06 PROCEDURE — 0241U HCHG SARS-COV-2 COVID-19 NFCT DS RESP RNA 4 TRGT MIC: CPT

## 2024-06-06 PROCEDURE — A9270 NON-COVERED ITEM OR SERVICE: HCPCS | Performed by: INTERNAL MEDICINE

## 2024-06-06 PROCEDURE — 83735 ASSAY OF MAGNESIUM: CPT

## 2024-06-06 PROCEDURE — 700111 HCHG RX REV CODE 636 W/ 250 OVERRIDE (IP): Performed by: INTERNAL MEDICINE

## 2024-06-06 PROCEDURE — 90677 PCV20 VACCINE IM: CPT | Performed by: INTERNAL MEDICINE

## 2024-06-06 PROCEDURE — 700102 HCHG RX REV CODE 250 W/ 637 OVERRIDE(OP): Performed by: HOSPITALIST

## 2024-06-06 PROCEDURE — 3E0234Z INTRODUCTION OF SERUM, TOXOID AND VACCINE INTO MUSCLE, PERCUTANEOUS APPROACH: ICD-10-PCS | Performed by: INTERNAL MEDICINE

## 2024-06-06 PROCEDURE — 80048 BASIC METABOLIC PNL TOTAL CA: CPT

## 2024-06-06 RX ORDER — CLONIDINE HYDROCHLORIDE 0.1 MG/1
0.1 TABLET ORAL EVERY 4 HOURS PRN
Status: DISCONTINUED | OUTPATIENT
Start: 2024-06-06 | End: 2024-06-11 | Stop reason: HOSPADM

## 2024-06-06 RX ORDER — LOSARTAN POTASSIUM 25 MG/1
25 TABLET ORAL
Status: DISCONTINUED | OUTPATIENT
Start: 2024-06-06 | End: 2024-06-07

## 2024-06-06 RX ADMIN — ONDANSETRON 4 MG: 4 TABLET, ORALLY DISINTEGRATING ORAL at 07:48

## 2024-06-06 RX ADMIN — FLUTICASONE FUROATE, UMECLIDINIUM BROMIDE AND VILANTEROL TRIFENATATE 1 PUFF: 100; 62.5; 25 POWDER RESPIRATORY (INHALATION) at 04:40

## 2024-06-06 RX ADMIN — OXYCODONE HYDROCHLORIDE 10 MG: 10 TABLET ORAL at 04:34

## 2024-06-06 RX ADMIN — FUROSEMIDE 40 MG: 10 INJECTION, SOLUTION INTRAMUSCULAR; INTRAVENOUS at 16:33

## 2024-06-06 RX ADMIN — AMOXICILLIN AND CLAVULANATE POTASSIUM 1 TABLET: 875; 125 TABLET, FILM COATED ORAL at 17:44

## 2024-06-06 RX ADMIN — LEVOTHYROXINE SODIUM 100 MCG: 0.1 TABLET ORAL at 04:34

## 2024-06-06 RX ADMIN — FAMOTIDINE 20 MG: 20 TABLET, FILM COATED ORAL at 04:33

## 2024-06-06 RX ADMIN — GABAPENTIN 100 MG: 100 CAPSULE ORAL at 12:36

## 2024-06-06 RX ADMIN — GABAPENTIN 100 MG: 100 CAPSULE ORAL at 04:34

## 2024-06-06 RX ADMIN — LOSARTAN POTASSIUM 25 MG: 25 TABLET, FILM COATED ORAL at 09:50

## 2024-06-06 RX ADMIN — PNEUMOCOCCAL 20-VALENT CONJUGATE VACCINE 0.5 ML
2.2; 2.2; 2.2; 2.2; 2.2; 2.2; 2.2; 2.2; 2.2; 2.2; 2.2; 2.2; 2.2; 2.2; 2.2; 2.2; 4.4; 2.2; 2.2; 2.2 INJECTION, SUSPENSION INTRAMUSCULAR at 21:28

## 2024-06-06 RX ADMIN — GABAPENTIN 100 MG: 100 CAPSULE ORAL at 17:44

## 2024-06-06 RX ADMIN — METHOCARBAMOL TABLETS 500 MG: 500 TABLET, COATED ORAL at 09:51

## 2024-06-06 RX ADMIN — CARVEDILOL 25 MG: 25 TABLET, FILM COATED ORAL at 17:44

## 2024-06-06 RX ADMIN — HEPARIN SODIUM 5000 UNITS: 5000 INJECTION, SOLUTION INTRAVENOUS; SUBCUTANEOUS at 14:45

## 2024-06-06 RX ADMIN — METHOCARBAMOL TABLETS 500 MG: 500 TABLET, COATED ORAL at 12:36

## 2024-06-06 RX ADMIN — VANCOMYCIN HYDROCHLORIDE 1000 MG: 5 INJECTION, POWDER, LYOPHILIZED, FOR SOLUTION INTRAVENOUS at 21:26

## 2024-06-06 RX ADMIN — AMLODIPINE BESYLATE 10 MG: 10 TABLET ORAL at 04:34

## 2024-06-06 RX ADMIN — VANCOMYCIN HYDROCHLORIDE 1000 MG: 5 INJECTION, POWDER, LYOPHILIZED, FOR SOLUTION INTRAVENOUS at 09:59

## 2024-06-06 RX ADMIN — HEPARIN SODIUM 5000 UNITS: 5000 INJECTION, SOLUTION INTRAVENOUS; SUBCUTANEOUS at 21:26

## 2024-06-06 RX ADMIN — CARVEDILOL 25 MG: 25 TABLET, FILM COATED ORAL at 09:50

## 2024-06-06 RX ADMIN — METHOCARBAMOL TABLETS 500 MG: 500 TABLET, COATED ORAL at 17:44

## 2024-06-06 RX ADMIN — ONDANSETRON 4 MG: 4 TABLET, ORALLY DISINTEGRATING ORAL at 21:55

## 2024-06-06 RX ADMIN — METHOCARBAMOL TABLETS 500 MG: 500 TABLET, COATED ORAL at 21:26

## 2024-06-06 RX ADMIN — HEPARIN SODIUM 5000 UNITS: 5000 INJECTION, SOLUTION INTRAVENOUS; SUBCUTANEOUS at 04:39

## 2024-06-06 RX ADMIN — AMOXICILLIN AND CLAVULANATE POTASSIUM 1 TABLET: 875; 125 TABLET, FILM COATED ORAL at 04:34

## 2024-06-06 ASSESSMENT — PAIN DESCRIPTION - PAIN TYPE
TYPE: ACUTE PAIN

## 2024-06-06 ASSESSMENT — ENCOUNTER SYMPTOMS
SENSORY CHANGE: 0
NERVOUS/ANXIOUS: 0
SHORTNESS OF BREATH: 0
NAUSEA: 0
ABDOMINAL PAIN: 0
MYALGIAS: 1
PALPITATIONS: 0
FEVER: 0
DIARRHEA: 0

## 2024-06-06 ASSESSMENT — LIFESTYLE VARIABLES: SUBSTANCE_ABUSE: 0

## 2024-06-06 NOTE — DISCHARGE PLANNING
DC Transport Scheduled    Transport Company Scheduled:  [employer]  Spoke with Debra at [facility] to schedule transport.    Scheduled Date: 6/7/2024  Scheduled Time: 0900    Destination: [facility]   [place] [address] [address]    Notified care team of scheduled transport via Vo[employer].     If there are any changes needed to the DC transportation scheduled, please contact [facility] at ext. [phone] between the hours of 3592-7652 Mon-Fri. If outside those hours, contact the ED Case Manager at ext. [phone].

## 2024-06-06 NOTE — WOUND TEAM
[facility]  Inpatient Services  Wound and Skin Care Follow-up    Admission Date: 5/30/2024     Last order of IP CONSULT TO WOUND CARE was found on 6/5/2024 from Hospital Encounter on 5/30/2024     HPI, PMH, SH: Reviewed    Past Surgical History:   Procedure Laterality Date    FEMORAL FEMORAL BYPASS N/A 5/31/2024    Procedure: REMOVAL OF FEMORAL-FEMORAL BYPASS GRAFT;  Surgeon: Vishal Alcantara M.D.;  Location: SURGERY [facility];  Service: Vascular    ANGIOGRAM N/A 5/31/2024    Procedure: ANGIOGRAM WITH STENT PLACEMENT;  Surgeon: Vishal Alcantara M.D.;  Location: SURGERY [facility];  Service: Vascular    FLAP GRAFT Right 5/31/2024    Procedure: RIGHT SARTORIUS MUSCLE GRAFT;  Surgeon: Vishal Alcantara M.D.;  Location: SURGERY [facility];  Service: Vascular    APPLICATION OR REPLACEMENT, WOUND VAC Bilateral 5/31/2024    Procedure: APPLICATION OR REPLACEMENT, WOUND VAC;  Surgeon: Vishal Alcantara M.D.;  Location: SURGERY [facility];  Service: Vascular    ABDOMINAL AORTIC ANEURYSM Bilateral 5/21/2024    Procedure: AORTIC DISSECTION REPAIR;  Surgeon: Gagandeep Arnett M.D.;  Location: SURGERY [facility];  Service: General    ECHOCARDIOGRAM, TRANSESOPHAGEAL, INTRAOPERATIVE N/A 5/21/2024    Procedure: ECHOCARDIOGRAM, TRANSESOPHAGEAL, INTRAOPERATIVE;  Surgeon: Gagandeep Arnett M.D.;  Location: SURGERY [facility];  Service: General    FEMORAL FEMORAL BYPASS Bilateral 5/21/2024    Procedure: CREATION, BYPASS, ARTERIAL, FEMORAL TO FEMORAL, USING GRAFT;  Surgeon: Gagandeep Arnett M.D.;  Location: SURGERY [facility];  Service: General     Social History     Tobacco Use    Smoking status: Every Day     Current packs/day: 5.00     Average packs/day: 5.0 packs/day (0.2 ttl pk-yrs)     Types: Cigarettes     Start date: 5/20/2024    Smokeless tobacco: Former    Tobacco comments:     Pt reports 1-2 cigs per day.    Substance Use Topics    Alcohol use: Yes     Comment: [facility] beer     Chief Complaint   Patient  presents with    Chest Pain     Initial complaint.  Currently 0/10.     Shortness of Breath    Dizziness    Groin Pain     With testicular edema.    Wound Infection     Possible, at surgical site supra pubic region.       Diagnosis: Wound infection [T14.8XXA, L08.9]    Unit where seen by Wound Team: S530/02     WOUND FOLLOW UP RELATED TO:  Bilateral groins        WOUND TEAM PLAN OF CARE - Frequency of Follow-up:   Nursing to follow dressing orders written for wound care. Contact wound team if area fails to progress, deteriorates or with any questions/concerns if something comes up before next scheduled follow up (See below as to whether wound is following and frequency of wound follow up)  Dressing changes by wound team:                   NPWT change 3 times weekly - bilateral groin     WOUND HISTORY:       Patient had an aortic dissection and surgeons were able to repair.  Bilateral angioplasty and stent placement of common iliac.  Patient discharged home, and returned with groin site infections.  Patient taken to surgery with Dr. Alcantara on 5/31/24 and placed wound vac.           WOUND ASSESSMENT/LDA  Negative Pressure Wound Therapy 05/31/24 Surgical Groin Right (Active)   Placement Date/Time: 05/31/24 (c) 1000   Inserted by: OR  Wound Type: Surgical  Location: Groin  Laterality: Right      Assessments 6/5/2024  1:00 PM   NPWT Pump Mode / Pressure Setting 125 mmHg   Dressing Type Black Foam (Veraflo)   Number of Foam Pieces Used 2   NEXT Dressing Change/Treatment Date 06/07/24   VAC VeraFlo Irrigant Normal Saline   VAC VeraFlo Soak Time (mins) 5   VAC VeraFlo Instill Volume (ml) 24   VAC VeraFlo - Therapy Time (hrs) 2   VAC VeraFlo Pressure (mm/Hg) 125 mmHg       Wound 06/03/24 Full Thickness Wound Groin Right (Active)   Date First Assessed/Time First Assessed: 06/03/24 1232   Present on Original Admission: No  Hand Hygiene Completed: Yes  Primary Wound Type: Full Thickness Wound  Location: Groin  Laterality:  Right      Assessments 6/5/2024  1:00 PM   Site Assessment Yellow;Red;Pink   Periwound Assessment Pink   Margins Defined edges;Unattached edges   Closure Secondary intention   Drainage Amount Moderate   Drainage Description Yellow;Tan   Treatments Cleansed;Nonselective debridement;Site care;Offloading   Wound Cleansing Approved Wound Cleanser   Periwound Protectant No-sting Skin Prep;Paste Ring;Drape   Dressing Status Clean;Dry;Intact   Dressing Changed Changed   Dressing Cleansing/Solutions Normal Saline   Dressing Options Wound Vac   Dressing Change/Treatment Frequency Monday, Wednesday, Friday, and As Needed   NEXT Dressing Change/Treatment Date 06/07/24   NEXT Weekly Photo (Inpatient Only) 06/10/24   Wound Team Following 3x Weekly   Non-staged Wound Description Full thickness   Shape oval   Wound Odor None   WOUND NURSE ONLY - Time Spent with Patient (mins) 60       Wound 06/03/24 Full Thickness Wound Groin Left (Active)   Date First Assessed/Time First Assessed: 06/03/24 1233   Present on Original Admission: No  Hand Hygiene Completed: Yes  Primary Wound Type: Full Thickness Wound  Location: Groin  Laterality: Left      Assessments 6/5/2024  1:00 PM   Site Assessment Red;Yellow   Periwound Assessment Intact   Margins Defined edges;Unattached edges   Closure Secondary intention   Drainage Amount Small   Drainage Description Serosanguineous   Treatments Cleansed;Nonselective debridement;Site care   Wound Cleansing Approved Wound Cleanser   Periwound Protectant No-sting Skin Prep;Paste Ring;Drape   Dressing Status Intact   Dressing Changed Changed   Dressing Cleansing/Solutions Normal Saline   Dressing Options Wound Vac   Dressing Change/Treatment Frequency Monday, Wednesday, Friday, and As Needed   NEXT Dressing Change/Treatment Date 06/07/24   NEXT Weekly Photo (Inpatient Only) 06/10/24   Wound Team Following 3x Weekly   Non-staged Wound Description Full thickness       Negative Pressure Wound Therapy 06/03/24  Groin Left (Active)   Placement Date/Time: 06/03/24 1233   Present on Original Admission: No  Hand Hygiene Completed: Yes  Location: Groin  Laterality: Left      Assessments 6/5/2024  1:00 PM   NPWT Pump Mode / Pressure Setting 125 mmHg   Dressing Type Black Foam (Veraflo)   Number of Foam Pieces Used 2   NEXT Dressing Change/Treatment Date 06/07/24   VAC VeraFlo Irrigant Normal Saline   VAC VeraFlo Soak Time (mins) 5   VAC VeraFlo Instill Volume (ml) 20   VAC VeraFlo - Therapy Time (hrs) 2   VAC VeraFlo Pressure (mm/Hg) 125 mmHg        Vascular:    NIRAJ:   No results found.    Lab Values:    Lab Results   Component Value Date/Time    WBC 7.0 06/05/2024 05:48 AM    RBC 3.89 (L) 06/05/2024 05:48 AM    HEMOGLOBIN 12.8 (L) 06/05/2024 05:48 AM    HEMATOCRIT 37.5 (L) 06/05/2024 05:48 AM    SEDRATEWES 16 05/31/2024 12:27 AM    HBA1C 5.0 05/30/2024 01:05 PM         Culture Results show:  Recent Results (from the past 720 hour(s))   CULTURE WOUND W/ GRAM STAIN    Collection Time: 05/30/24  4:35 PM    Specimen: Exudate; Wound   Result Value Ref Range    Significant Indicator POS (POS)     Source WND     Site LLQ Abdomen     Culture Result Light growth usual skin eliza. (A)     Gram Stain Result Many WBCs.  No organisms seen.       Culture Result (A)      Corynebacterium amycolatum  Moderate growth  Susceptibilities in progress         Pain Level/Medicated:  PO pain medications administered by bedside RN 1 hour prior and IV pain medications administered by bedside RN 5 minutes prior (Pt educated that IV medication will not be available on outpatient basis)       INTERVENTIONS BY WOUND TEAM:  Chart and images reviewed. Discussed with bedside RN. All areas of concern (based on picture review, LDA review and discussion with bedside RN) have been thoroughly assessed. Documentation of areas based on significant findings. This RN in to assess patient. Performed standard wound care which includes appropriate positioning, dressing  removal and non-selective debridement. Pictures and measurements obtained weekly if/when required.    Wound:  Bilateral groins  Cleansed/Non-selectively Debrided with:  Wound cleanser and Gauze  Michell wound: Cleansed with Wound cleanser and Gauze, Prepped with No Sting, Paste Rings, and Drape  Primary Dressing:  black vf foam tucked into tracts and sealed with drape  Secondary (Outer) Dressing: offloading with adhesive foams    Advanced Wound Care Discharge Planning  Number of Clinicians necessary to complete wound care: 1  Is patient requiring IV pain medications for dressing changes:  Yes  Length of time for dressing change 60 min. (This does not include chart review, pre-medication time, set up, clean up or time spent charting.)    Interdisciplinary consultation: Patient, Bedside RN (Tiffany), Bhavya (Wound Tech).  Pressure injury and staging reviewed with N/A.    EVALUATION / RATIONALE FOR TREATMENT:     Date:  06/05/24  Wound Status:  Wound progressing as expected    R. Groin with adherent slough, L groin improving with more red tissue.  No odor present.  Changed to normal saline per Dr. Ahmadi and then change patient to regular wound vac Friday.    Date:  06/03/24  Wound Status:  Initial evaluation    Bilateral groins are full thickness, adipose tissue present.   VF NPWT applied to assist with mechanical debridement, cleansing the wound bed, increase oxygenation and granulation to the area and healing the wound by secondary intention.       Bilateral heels: intact offloaded with adhesive foam  Sacrum: intact            Goals: Steady decrease in wound area and depth weekly.    NURSING PLAN OF CARE ORDERS:  Dressing changes: See Dressing Care orders    NUTRITION RECOMMENDATIONS   Wound Team Recommendations:  Dietary consult     DIET ORDERS (From admission to next 24h)       Start     Ordered    05/31/24 1623  Diet Order Diet: 2 Gram Sodium  ALL MEALS        Question:  Diet:  Answer:  2 Gram Sodium    05/31/24 6849                     PREVENTATIVE INTERVENTIONS:   Q shift Tano - performed per nursing policy  Q shift pressure point assessments - performed per nursing policy    Surface/Positioning  Standard/trauma mattress - Currently in Place    Mobilization      Up to chair     Anticipated discharge plans:  TBD        Vac Discharge Needs:  Vac Discharge plan is purely a recommendation from wound team and not a requirement for discharge unless otherwise stated by physician.  Veraflo Vac while inpatient, ok to transition to Regular Vac on discharge

## 2024-06-06 NOTE — DISCHARGE PLANNING
,Case Management Discharge Planning    Admission Date: 5/30/2024  GMLOS: 5.4  ALOS: 7    6-Clicks ADL Score: 21  6-Clicks Mobility Score: 20    Anticipated Discharge Dispo: Discharge Disposition: D/T to SNF with Medicare cert in anticipation of skilled care (03)    DME Needed: No    Action(s) Taken:   JAZZ RN received a call from Meaghan with [facility] SNF who stated that after further chart review pt can not be accepted tomorrow Friday 6/7 as previously stated due to uncontrolled blood pressure at this time. Per Meaghan, they will reevaluate for potential acceptance on Monday 6/10.   MD and Charge RN informed.     CM RN requested ride line place [facility] transport on will call.     Escalations Completed: None    Medically Clear: No    Next Steps:  JAZZ RN to follow up with medical team to discuss discharge barriers or needs.     Barriers to Discharge: Medical clearance and BP control     Is the patient up for discharge tomorrow: No, [facility] put on will call.

## 2024-06-06 NOTE — DISCHARGE PLANNING
Case Management Discharge Planning    Admission Date: 5/30/2024  GMLOS: 5.4  ALOS: 7    6-Clicks ADL Score: 21  6-Clicks Mobility Score: 20    Anticipated Discharge Dispo: Discharge Disposition: D/T to SNF with Medicare cert in anticipation of skilled care (03)    DME Needed: No    Action(s) Taken:   Pt had PICC line placed in LUE this am, and COVID test ordered, and MD confirmed Lasix can be switched to PO upon discharge, as requested by Meaghan with [facility]. Pending final confirmation on acceptance into facility.     CM RN met with pt at bedside to anticipated discharge plan and obtain choice. Pt agreeable and SNF choice form completed by pt and form faxed to [facility] for processing.   1) [facility] ([facility])     1130  CM RN attempted to contact Meaghan with [facility] to provide update on request and confirm pt can be accepted into facility tomorrow. Left voicemail with direct contact information requesting call back.      1326  CM RN received voice mail from Meaghan with [facility] confirming pt has been accepted and bed is available tomorrow for admission. Meaghan requested transportation be set up for tomorrow 6/7 with a  time of 0900.   MD informed and stated pt clear to transfer to [facility] tomorrow.     CM RN requested transport via ride line for  time of 0900, as requested. Pending confirmation.     1412  Ride line confirmed transport for tomorrow 6/7 with  time of 0900 via [facility] to the [facility].     CM RN provided confirmed transport time to Meaghan. DC summary requested to be completed no later than 2 hours before scheduled transport time. MD informed.     1226  CM RN met with pt at bedside to discuss discharge plan. Pt verbalized he is agreeable to discharge to [facility] tomorrow and provided a physical signature for the COBRA.     Escalations Completed: None    Medically Clear: No    Next Steps:  CM RN to follow up with medical team to discuss discharge barriers or needs.     Barriers to Discharge:  Medical clearance and Pending COVID test collected to result.     Is the patient up for discharge tomorrow: Yes    Is transport arranged for discharge disposition: Yes

## 2024-06-06 NOTE — CARE PLAN
The patient is Stable - Low risk of patient condition declining or worsening    Shift Goals  Clinical Goals: Patient will remain free from falls throughout shift. Pt will also ambulate to hallway at least once this shift.  Patient Goals: Pt's pain will be maintained at a 4/10 or less throughout shift and patient will rest comfortably.  Family Goals: NA    Progress made toward(s) clinical / shift goals:  Pt remained free from falls throughout shift. Pt's pain maintained at a 4/10 or less throughout shift and pt denied the need for pain medication.     Patient is not progressing towards the following goals:

## 2024-06-06 NOTE — DISCHARGE PLANNING
Choice form for [facility] scanned into media    132PushCall- Per Meaghan at the [facility], pt accepted.

## 2024-06-06 NOTE — PROGRESS NOTES
Bedside report received at 0655.     Patient is A&O x 4.  Patient denies pain.  Patient is a one person assist.  Full assessment completed.  Pt refused bed alarm. Charge RN notified.  Call light is within reach. All questions answered at this time. Hourly rounding in place.

## 2024-06-06 NOTE — PROGRESS NOTES
VASCULAR SURGERY PROGRESS NOTE      Awake, no complaints.    AF, 171/85     General: Morbidly obese male in no apparent distress.  Abdomen: Soft, nondistended, obese.  Lower extremities: Veraflo's in place, no bleeding.  Feet are warm, well-perfused.  2+ edema.     Labs: Reviewed.        Assessment:   1) Status post removal of infected femoral-femoral bypass, angiogram with stent placement, right sartorius muscle flap, and wound VAC placement.  2) Hypertension.     Plan:  Doing well.  I asked Wound Care to change Veraflo to regular wound vac..  Blood pressure is not under control.  No IV access at current time.  Clonidine p.o. prn.  I also added Losartan.  Keep systolic blood pressure 140 or less.     OK to be transferred to SNF from vascular standpoint once BP is under control.    Discussed with patient.  All questions were answered.

## 2024-06-06 NOTE — PROGRESS NOTES
Hospital Medicine Daily Progress Note    Date of Service  6/6/2024    Chief Complaint  Groin pain and sob    Hospital Course  Rene Kelley is a 53 y.o. male with morbid obesity, HTN, smoker, CALOS, aortic dissection s/p aortic stent graft, right exernal iliac stent placement and left common iliac. He had a left to right femoral-femoral artery bypass graft.  He was admitted with low grade fever and leg/scrotal swelling admitted 5/30/2024 with a concern of surgical groin wound site drainage/infection.  Vascular surgeon and ID were consulted. He had removal of infected F-F bypass and wound VAC placement. Culture grew out Corynebacterium. MRSA nares negative.     Interval Problem Update    Patient was seen and examined at bedside.  I have personally reviewed and interpreted vitals, labs, and imaging.    6/5.  Afebrile.  Mild hypertension.  On room air.  Patient feels little groggy.  Just received pain meds for recent dressing changes.  Denies fevers, chills, chest pains, shortness of breath.  Has some intermittent pain around his groin and his back.  Discussed with social work.  [facility] for [facility] and wound VAC.  Counseled on smoking cessation  6/6.  Afebrile.  Hypertensive this morning.  On room air.  Patient had blood pressures in the 170s this morning.  Patient states never taking his blood pressure when he was actively nauseous and vomiting.  Denies fever, chills, chest pain, shortness of breath.  Started on losartan in addition to amlodipine and carvedilol.  PICC placed for long-term antibiotics.  [facility] for [facility] and wound vacs but will need better control of blood pressure.    I have discussed this patient's plan of care and discharge plan at IDT rounds today with Case Management, Nursing, Nursing leadership, and other members of the IDT team.    Consultants/Specialty  infectious disease and vascular surgery    Code Status  Full  "Code    Disposition  The patient is not medically cleared for discharge to home or a post-acute facility.  Anticipate discharge to: skilled nursing facility    I have placed the appropriate orders for post-discharge needs.    Review of Systems  Review of Systems   Constitutional:  Negative for fever and malaise/fatigue.   Respiratory:  Negative for shortness of breath.    Cardiovascular:  Negative for palpitations and leg swelling.   Gastrointestinal:  Negative for abdominal pain, diarrhea and nausea.   Genitourinary:  Negative for dysuria.   Musculoskeletal:  Positive for myalgias (bilateral groin sites but \"tolerable\").   Neurological:  Negative for sensory change.   Psychiatric/Behavioral:  Negative for substance abuse. The patient is not nervous/anxious.         Physical Exam  Temp:  [36.6 °C (97.8 °F)-37.1 °C (98.7 °F)] 37.1 °C (98.7 °F)  Pulse:  [61-76] 73  Resp:  [18-19] 19  BP: (139-172)/(74-90) 171/80  SpO2:  [94 %-97 %] 97 %    Physical Exam  Vitals reviewed.   Constitutional:       Appearance: Normal appearance. He is obese. He is not diaphoretic.   HENT:      Head: Normocephalic and atraumatic.      Nose: Nose normal.      Mouth/Throat:      Mouth: Mucous membranes are moist.      Pharynx: No oropharyngeal exudate.   Eyes:      General: No scleral icterus.        Right eye: No discharge.         Left eye: No discharge.      Extraocular Movements: Extraocular movements intact.      Conjunctiva/sclera: Conjunctivae normal.   Cardiovascular:      Rate and Rhythm: Normal rate and regular rhythm.      Pulses:           Radial pulses are 2+ on the right side and 2+ on the left side.        Dorsalis pedis pulses are 2+ on the right side and 2+ on the left side.      Heart sounds: No murmur heard.  Pulmonary:      Effort: Pulmonary effort is normal. No respiratory distress.      Breath sounds: Normal breath sounds. No wheezing or rales.   Abdominal:      General: Bowel sounds are normal. There is no distension. "      Palpations: Abdomen is soft.      Tenderness: There is no abdominal tenderness.   Musculoskeletal:         General: No swelling.      Cervical back: Neck supple. No muscular tenderness.      Right lower leg: No edema.      Left lower leg: No edema.   Lymphadenopathy:      Cervical: No cervical adenopathy.   Skin:     Coloration: Skin is not jaundiced or pale.      Comments: Bilateral groin with wound VAC   Neurological:      General: No focal deficit present.      Mental Status: He is alert and oriented to person, place, and time. Mental status is at baseline.      Cranial Nerves: No cranial nerve deficit.   Psychiatric:         Mood and Affect: Mood normal.         Behavior: Behavior normal.         Fluids    Intake/Output Summary (Last 24 hours) at 6/6/2024 1520  Last data filed at 6/6/2024 1021  Gross per 24 hour   Intake 360 ml   Output 4875 ml   Net -4515 ml       Laboratory  Recent Labs     06/04/24  0324 06/05/24  0548 06/06/24  0216   WBC 7.4 7.0 7.6   RBC 3.85* 3.89* 4.01*   HEMOGLOBIN 12.5* 12.8* 13.3*   HEMATOCRIT 37.8* 37.5* 39.3*   MCV 98.2* 96.4 98.0*   MCH 32.5 32.9 33.2*   MCHC 33.1 34.1 33.8   RDW 49.0 48.8 49.1   PLATELETCT 293 301 285   MPV 9.5 9.3 10.0     Recent Labs     06/04/24  0324 06/05/24  0548 06/06/24  0216   SODIUM 133* 134* 135   POTASSIUM 4.1 4.1 4.3   CHLORIDE 102 102 101   CO2 22 22 22   GLUCOSE 99 94 97   BUN 19 20 19   CREATININE 1.08 1.07 1.02   CALCIUM 8.0* 8.5 8.6                   Imaging  IR-PICC LINE PLACEMENT W/ GUIDANCE > AGE 5   Final Result                  Ultrasound-guided PICC placement performed by qualified nursing staff as    above.          EC-ECHOCARDIOGRAM COMPLETE W/ CONT   Final Result      EC-ECHOCARDIOGRAM COMPLETE W/O CONT         DX-CHEST-PORTABLE (1 VIEW)   Final Result      1.  Trace LEFT pleural effusion   2.  Decreased LEFT peripheral pulmonary opacity which could be atelectasis or pneumonia      CT-CTA COMPLETE THORACOABDOMINAL AORTA   Final  Result      1.  Type B aortic dissection status post thoracic stent graft. Dissection flap is unchanged extending from just beyond the left subclavian artery origin into the bilateral external iliac arteries.   2.  Proximal descending thoracic aortic aneurysm measuring 4.5 x 4.5 cm.   3.  Status post bilateral common iliac artery stent placement and femorofemoral bypass. Stents and distal femoral arteries appear patent.   4.  Mild stranding and multiple foci of gas in the lower anterior abdominal wall without focal drainable fluid collection just chest abscess.   5.  Redemonstrated extension of the abdominal aortic dissection into the celiac artery which supplied by both the true and false lumen. Distal vessels appear patent.   6.  Diffuse bilateral tree-in-bud opacities which could be seen in setting edema and/or infection.   7.  15 mm lingular pulmonary nodule.   8.  Trace left pleural effusion.   9.  Nonobstructive bilateral renal calculi.      Hooper Type B aortic dissection      Fleischner Society pulmonary nodule recommendations:   Low and High Risk: Consider CT at 3 months, PET/CT, or tissue sampling.      Low Risk - Minimal or absent history of smoking and of other known risk factors.      High Risk - History of smoking or of other known risk factors.      Note: These recommendations do not apply to lung cancer screening, patients with immunosuppression, or patients with known primary cancer.      Fleischner Society 2017 Guidelines for Management of Incidentally Detected Pulmonary Nodules in Adults               IR-ABDOMINAL AORTA-WITH RUNOFF    (Results Pending)        Assessment/Plan  * Wound infection- (present on admission)  Assessment & Plan  6/6/2024  Pustular drainage from his groin wound  Staples removed in the ER  Pain control  Vancomycin and augmentin  Follow-up on wound cultures and blood cultures  Vascular surgery following and took patient to OR  Infectious disease consulting     Acute  diastolic heart failure (HCC)  Assessment & Plan  6/6/2024  Presents decompensated  IV Lasix 40 3x daily  As of 6/4 net negative 14/9 liters since admit.  Strict ins and outs and daily weights  Monitor on telemetry    Acute respiratory failure with hypoxia (HCC)  Assessment & Plan  6/6/2024  On room air  Nocturnal bipap    Tobacco abuse- (present on admission)  Assessment & Plan  Spent approx 5 mins on Tobacco cessation education . Discussed options of nicotine patch, medical treatment with wellbutrin and chantix. Discussed the benefits of quitting smoking and risks of continued smoking including cardiovascular disease, cancer and COPD.   Code 15628  -I have ordered nicotine replacement therapy    Acquired hypothyroidism- (present on admission)  Assessment & Plan  6/6/2024  Continue levothyroxine  5/25/24 TSH:7.4 FT4:0.83  Synthroid 100mcg daily    Morbid obesity due to excess calories (HCC)- (present on admission)  Assessment & Plan  Body mass index is 47.23 kg/m².  Encourage future lifestyle modifications.    Dissection of aorta (HCC)- (present on admission)  Assessment & Plan  6/6/2024  status post emergent endovascular repair of descending thoracic aortic type B dissection with placement of thoracic stent graft  Patient also underwent iliac stenting and femoral to femoral artery bypass  Pain control  Monitor blood pressure  He has multiple stents and graft placement.    Not on asa at this time.  Control lipids     Hypertension- (present on admission)  Assessment & Plan  6/6/2024  Uncontrolled  Continue home blood pressure medications  Amlodipine, coreg, lasix  Wean lasix based on renal function and vitals.         VTE prophylaxis: Heparin    I have performed a physical exam and reviewed and updated ROS and Plan today (6/6/2024). In review of yesterday's note (6/5/2024), there are no changes except as documented above.      Greater than 50 minutes spent prepping to see patient (e.g. review of tests) obtaining  and/or reviewing separately obtained history. Performing a medically appropriate examination and/ evaluation.  Counseling and educating the patient/family/caregiver.  Ordering medications, tests, or procedures.  Referring and communicating with other health care professionals.  Documenting clinical information in EPIC.  Independently interpreting results and communicating results to patient/family/caregiver.  Care coordination.

## 2024-06-06 NOTE — PROCEDURES
Vascular Access Team     Date of Insertion: 6/6/24  Arm Circumference: 38  Internal length: 48  External Length: 0  Vein Occupancy %: 21   Reason for PICC: abx  Labs: WBC 7.6, , BUN 19, Cr 1.02, GFR 88, INR 1.25     Consents confirmed, vessel patency confirmed with ultrasound. Risks and benefits of procedure explained to patient and education regarding central line associated bloodstream infections provided. Questions answered.      PICC placed in LUE per licensed provider order with ultrasound guidance.  4 Fr, single lumen PICC placed in brachial vein after 1 attempt(s). 2 mL of 1% lidocaine injected intradermally at the insertion site. A 21 gauge microintroducer needle was visualized entering the vein and modified Seldinger technique was used to obtain access to the vein. 48 cm catheter inserted and brisk blood return was observed from each lumen upon aspiration. Line secured at the 0 cm marker. TCS stylet removed and observed to be fully intact. Each lumen flushed using pulsatile method without resistance with 10 mL 0.9% normal saline. PICC line secured with Biopatch and Tegaderm.     PICC tip placement location is confirmed by nurse to be in the Superior Vena Cava (SVC) utilizing 3CG technology. PICC line is appropriate for use at this time. Patient tolerated procedure well, without complications.  Patient condition relayed to primary RN or ordering physician via this post procedure note in the EMR.      Ultrasound images uploaded to PACS and viewable in the EMR - yes  Ultrasound imaged printed and placed in paper chart - no     BARD Power PICC ref # [id number], Lot # [id number], Expiration Date 6/30/25

## 2024-06-07 LAB
ANION GAP SERPL CALC-SCNC: 10 MMOL/L (ref 7–16)
BASOPHILS # BLD AUTO: 0.8 % (ref 0–1.8)
BASOPHILS # BLD: 0.06 K/UL (ref 0–0.12)
BUN SERPL-MCNC: 19 MG/DL (ref 8–22)
CALCIUM SERPL-MCNC: 8.8 MG/DL (ref 8.5–10.5)
CHLORIDE SERPL-SCNC: 103 MMOL/L (ref 96–112)
CO2 SERPL-SCNC: 24 MMOL/L (ref 20–33)
CREAT SERPL-MCNC: 1.08 MG/DL (ref 0.5–1.4)
EOSINOPHIL # BLD AUTO: 0.39 K/UL (ref 0–0.51)
EOSINOPHIL NFR BLD: 5.5 % (ref 0–6.9)
ERYTHROCYTE [DISTWIDTH] IN BLOOD BY AUTOMATED COUNT: 49.1 FL (ref 35.9–50)
GFR SERPLBLD CREATININE-BSD FMLA CKD-EPI: 82 ML/MIN/1.73 M 2
GLUCOSE SERPL-MCNC: 100 MG/DL (ref 65–99)
HCT VFR BLD AUTO: 37.5 % (ref 42–52)
HGB BLD-MCNC: 12.6 G/DL (ref 14–18)
IMM GRANULOCYTES # BLD AUTO: 0.07 K/UL (ref 0–0.11)
IMM GRANULOCYTES NFR BLD AUTO: 1 % (ref 0–0.9)
LYMPHOCYTES # BLD AUTO: 1.03 K/UL (ref 1–4.8)
LYMPHOCYTES NFR BLD: 14.5 % (ref 22–41)
MAGNESIUM SERPL-MCNC: 2 MG/DL (ref 1.5–2.5)
MCH RBC QN AUTO: 32.6 PG (ref 27–33)
MCHC RBC AUTO-ENTMCNC: 33.6 G/DL (ref 32.3–36.5)
MCV RBC AUTO: 97.2 FL (ref 81.4–97.8)
MONOCYTES # BLD AUTO: 0.92 K/UL (ref 0–0.85)
MONOCYTES NFR BLD AUTO: 13 % (ref 0–13.4)
NEUTROPHILS # BLD AUTO: 4.61 K/UL (ref 1.82–7.42)
NEUTROPHILS NFR BLD: 65.2 % (ref 44–72)
NRBC # BLD AUTO: 0 K/UL
NRBC BLD-RTO: 0 /100 WBC (ref 0–0.2)
PHOSPHATE SERPL-MCNC: 3.1 MG/DL (ref 2.5–4.5)
PLATELET # BLD AUTO: 290 K/UL (ref 164–446)
PMV BLD AUTO: 9.5 FL (ref 9–12.9)
POTASSIUM SERPL-SCNC: 4.2 MMOL/L (ref 3.6–5.5)
RBC # BLD AUTO: 3.86 M/UL (ref 4.7–6.1)
SODIUM SERPL-SCNC: 137 MMOL/L (ref 135–145)
WBC # BLD AUTO: 7.1 K/UL (ref 4.8–10.8)

## 2024-06-07 PROCEDURE — 83735 ASSAY OF MAGNESIUM: CPT

## 2024-06-07 PROCEDURE — A9270 NON-COVERED ITEM OR SERVICE: HCPCS | Performed by: HOSPITALIST

## 2024-06-07 PROCEDURE — 700102 HCHG RX REV CODE 250 W/ 637 OVERRIDE(OP): Performed by: SURGERY

## 2024-06-07 PROCEDURE — 700102 HCHG RX REV CODE 250 W/ 637 OVERRIDE(OP): Performed by: HOSPITALIST

## 2024-06-07 PROCEDURE — 99233 SBSQ HOSP IP/OBS HIGH 50: CPT | Performed by: INTERNAL MEDICINE

## 2024-06-07 PROCEDURE — 700111 HCHG RX REV CODE 636 W/ 250 OVERRIDE (IP): Performed by: HOSPITALIST

## 2024-06-07 PROCEDURE — 97597 DBRDMT OPN WND 1ST 20 CM/<: CPT

## 2024-06-07 PROCEDURE — 700111 HCHG RX REV CODE 636 W/ 250 OVERRIDE (IP): Performed by: SURGERY

## 2024-06-07 PROCEDURE — 700105 HCHG RX REV CODE 258: Performed by: INTERNAL MEDICINE

## 2024-06-07 PROCEDURE — A9270 NON-COVERED ITEM OR SERVICE: HCPCS | Performed by: SURGERY

## 2024-06-07 PROCEDURE — 84100 ASSAY OF PHOSPHORUS: CPT

## 2024-06-07 PROCEDURE — 80048 BASIC METABOLIC PNL TOTAL CA: CPT

## 2024-06-07 PROCEDURE — 700111 HCHG RX REV CODE 636 W/ 250 OVERRIDE (IP): Performed by: INTERNAL MEDICINE

## 2024-06-07 PROCEDURE — 97605 NEG PRS WND THER DME<=50SQCM: CPT

## 2024-06-07 PROCEDURE — 700102 HCHG RX REV CODE 250 W/ 637 OVERRIDE(OP): Performed by: INTERNAL MEDICINE

## 2024-06-07 PROCEDURE — 85025 COMPLETE CBC W/AUTO DIFF WBC: CPT

## 2024-06-07 PROCEDURE — A9270 NON-COVERED ITEM OR SERVICE: HCPCS | Performed by: INTERNAL MEDICINE

## 2024-06-07 PROCEDURE — 770006 HCHG ROOM/CARE - MED/SURG/GYN SEMI*

## 2024-06-07 RX ORDER — LOSARTAN POTASSIUM 50 MG/1
50 TABLET ORAL
Status: DISCONTINUED | OUTPATIENT
Start: 2024-06-08 | End: 2024-06-08

## 2024-06-07 RX ADMIN — HEPARIN SODIUM 5000 UNITS: 5000 INJECTION, SOLUTION INTRAVENOUS; SUBCUTANEOUS at 06:07

## 2024-06-07 RX ADMIN — LOSARTAN POTASSIUM 25 MG: 25 TABLET, FILM COATED ORAL at 06:08

## 2024-06-07 RX ADMIN — FUROSEMIDE 40 MG: 10 INJECTION, SOLUTION INTRAMUSCULAR; INTRAVENOUS at 06:07

## 2024-06-07 RX ADMIN — FLUTICASONE FUROATE, UMECLIDINIUM BROMIDE AND VILANTEROL TRIFENATATE 1 PUFF: 100; 62.5; 25 POWDER RESPIRATORY (INHALATION) at 06:07

## 2024-06-07 RX ADMIN — LEVOTHYROXINE SODIUM 100 MCG: 0.1 TABLET ORAL at 06:07

## 2024-06-07 RX ADMIN — METHOCARBAMOL TABLETS 500 MG: 500 TABLET, COATED ORAL at 20:07

## 2024-06-07 RX ADMIN — GABAPENTIN 100 MG: 100 CAPSULE ORAL at 06:08

## 2024-06-07 RX ADMIN — AMLODIPINE BESYLATE 10 MG: 10 TABLET ORAL at 06:08

## 2024-06-07 RX ADMIN — METHOCARBAMOL TABLETS 500 MG: 500 TABLET, COATED ORAL at 18:09

## 2024-06-07 RX ADMIN — AMOXICILLIN AND CLAVULANATE POTASSIUM 1 TABLET: 875; 125 TABLET, FILM COATED ORAL at 18:09

## 2024-06-07 RX ADMIN — GABAPENTIN 100 MG: 100 CAPSULE ORAL at 18:09

## 2024-06-07 RX ADMIN — VANCOMYCIN HYDROCHLORIDE 1000 MG: 5 INJECTION, POWDER, LYOPHILIZED, FOR SOLUTION INTRAVENOUS at 20:08

## 2024-06-07 RX ADMIN — METHOCARBAMOL TABLETS 500 MG: 500 TABLET, COATED ORAL at 12:25

## 2024-06-07 RX ADMIN — OXYCODONE HYDROCHLORIDE 5 MG: 5 TABLET ORAL at 06:08

## 2024-06-07 RX ADMIN — VANCOMYCIN HYDROCHLORIDE 1000 MG: 5 INJECTION, POWDER, LYOPHILIZED, FOR SOLUTION INTRAVENOUS at 09:31

## 2024-06-07 RX ADMIN — FUROSEMIDE 40 MG: 10 INJECTION, SOLUTION INTRAMUSCULAR; INTRAVENOUS at 16:37

## 2024-06-07 RX ADMIN — HEPARIN SODIUM 5000 UNITS: 5000 INJECTION, SOLUTION INTRAVENOUS; SUBCUTANEOUS at 20:08

## 2024-06-07 RX ADMIN — HEPARIN SODIUM 5000 UNITS: 5000 INJECTION, SOLUTION INTRAVENOUS; SUBCUTANEOUS at 14:55

## 2024-06-07 RX ADMIN — HYDROMORPHONE HYDROCHLORIDE 1 MG: 1 INJECTION, SOLUTION INTRAMUSCULAR; INTRAVENOUS; SUBCUTANEOUS at 10:48

## 2024-06-07 RX ADMIN — CARVEDILOL 25 MG: 25 TABLET, FILM COATED ORAL at 18:09

## 2024-06-07 RX ADMIN — CARVEDILOL 25 MG: 25 TABLET, FILM COATED ORAL at 07:32

## 2024-06-07 RX ADMIN — FAMOTIDINE 20 MG: 20 TABLET, FILM COATED ORAL at 06:07

## 2024-06-07 RX ADMIN — AMOXICILLIN AND CLAVULANATE POTASSIUM 1 TABLET: 875; 125 TABLET, FILM COATED ORAL at 06:07

## 2024-06-07 RX ADMIN — GABAPENTIN 100 MG: 100 CAPSULE ORAL at 12:25

## 2024-06-07 RX ADMIN — METHOCARBAMOL TABLETS 500 MG: 500 TABLET, COATED ORAL at 09:27

## 2024-06-07 RX ADMIN — OXYCODONE HYDROCHLORIDE 10 MG: 10 TABLET ORAL at 09:27

## 2024-06-07 ASSESSMENT — ENCOUNTER SYMPTOMS
MYALGIAS: 1
NAUSEA: 0
PALPITATIONS: 0
SHORTNESS OF BREATH: 0
SENSORY CHANGE: 0
ABDOMINAL PAIN: 0
FEVER: 0
NERVOUS/ANXIOUS: 0
DIARRHEA: 0

## 2024-06-07 ASSESSMENT — PAIN DESCRIPTION - PAIN TYPE
TYPE: ACUTE PAIN

## 2024-06-07 ASSESSMENT — PATIENT HEALTH QUESTIONNAIRE - PHQ9
SUM OF ALL RESPONSES TO PHQ9 QUESTIONS 1 AND 2: 0
1. LITTLE INTEREST OR PLEASURE IN DOING THINGS: NOT AT ALL
2. FEELING DOWN, DEPRESSED, IRRITABLE, OR HOPELESS: NOT AT ALL

## 2024-06-07 ASSESSMENT — LIFESTYLE VARIABLES: SUBSTANCE_ABUSE: 0

## 2024-06-07 NOTE — PROGRESS NOTES
Received report and assumed care of patient at change of shift. Patient A&Ox4 , on RA , and reports 4/10 pain at this time. Declines any medication at this time.Patient assessment completed, bed in lowest position, and call light and personal belongings within reach. Patient expressed no further needs at this time.

## 2024-06-07 NOTE — PROGRESS NOTES
Hospital Medicine Daily Progress Note    Date of Service  6/7/2024    Chief Complaint  Groin pain and sob    Hospital Course  Rene Kelley is a 53 y.o. male with morbid obesity, HTN, smoker, CALOS, aortic dissection s/p aortic stent graft, right exernal iliac stent placement and left common iliac. He had a left to right femoral-femoral artery bypass graft.  He was admitted with low grade fever and leg/scrotal swelling admitted 5/30/2024 with a concern of surgical groin wound site drainage/infection.  Vascular surgeon and ID were consulted.     Status post removal of infected femoral-femoral bypass and placement of wound VAC on 5/31.  Or cultures grew Corynebacterium.  MRSA nares negative.  ID recommended Augmentin and vancomycin for 6 weeks.      Interval Problem Update    Patient was seen and examined at bedside.  I have personally reviewed and interpreted vitals, labs, and imaging.    6/5.  Afebrile.  Mild hypertension.  On room air.  Patient feels little groggy.  Just received pain meds for recent dressing changes.  Denies fevers, chills, chest pains, shortness of breath.  Has some intermittent pain around his groin and his back.  Discussed with social work.  [facility] and wound VAC.  Counseled on smoking cessation  6/6.  Afebrile.  Hypertensive this morning.  On room air.  Patient had blood pressures in the 170s this morning.  Patient states never taking his blood pressure when he was actively nauseous and vomiting.  Denies fever, chills, chest pain, shortness of breath.  Started on losartan in addition to amlodipine and carvedilol.  PICC placed for long-term antibiotics.  [facility] for [facility] and wound vacs but will need better control of blood pressure.  6/7.  Afebrile.  Hypertension is improved.  On room air.  Per vascular surgery goal systolic less than 140.  Denies fever, chills, chest pain, shortness of breath.  Pain is controlled  "currently the patient is anxious for wound care plan for today.  Blood pressure is better controlled.  Continue titrating with increasing losartan.    I have discussed this patient's plan of care and discharge plan at IDT rounds today with Case Management, Nursing, Nursing leadership, and other members of the IDT team.    Consultants/Specialty  infectious disease and vascular surgery    Code Status  Full Code    Disposition  The patient is not medically cleared for discharge to home or a post-acute facility.  Anticipate discharge to: skilled nursing facility    I have placed the appropriate orders for post-discharge needs.    Review of Systems  Review of Systems   Constitutional:  Negative for fever and malaise/fatigue.   Respiratory:  Negative for shortness of breath.    Cardiovascular:  Negative for palpitations and leg swelling.   Gastrointestinal:  Negative for abdominal pain, diarrhea and nausea.   Genitourinary:  Negative for dysuria.   Musculoskeletal:  Positive for myalgias (bilateral groin sites but \"tolerable\").   Neurological:  Negative for sensory change.   Psychiatric/Behavioral:  Negative for substance abuse. The patient is not nervous/anxious.         Physical Exam  Temp:  [36.2 °C (97.2 °F)-37.1 °C (98.7 °F)] 36.2 °C (97.2 °F)  Pulse:  [63-75] 65  Resp:  [17-20] 20  BP: (129-171)/(55-85) 143/63  SpO2:  [94 %-97 %] 94 %    Physical Exam  Vitals reviewed.   Constitutional:       Appearance: Normal appearance. He is obese. He is not diaphoretic.   HENT:      Head: Normocephalic and atraumatic.      Nose: Nose normal.      Mouth/Throat:      Mouth: Mucous membranes are moist.      Pharynx: No oropharyngeal exudate.   Eyes:      General: No scleral icterus.        Right eye: No discharge.         Left eye: No discharge.      Extraocular Movements: Extraocular movements intact.      Conjunctiva/sclera: Conjunctivae normal.   Cardiovascular:      Rate and Rhythm: Normal rate and regular rhythm.      Pulses: "           Radial pulses are 2+ on the right side and 2+ on the left side.        Dorsalis pedis pulses are 2+ on the right side and 2+ on the left side.      Heart sounds: No murmur heard.  Pulmonary:      Effort: Pulmonary effort is normal. No respiratory distress.      Breath sounds: Normal breath sounds. No wheezing or rales.   Abdominal:      General: Bowel sounds are normal. There is no distension.      Palpations: Abdomen is soft.      Tenderness: There is no abdominal tenderness.   Musculoskeletal:         General: No swelling.      Cervical back: Neck supple. No muscular tenderness.      Right lower leg: No edema.      Left lower leg: No edema.   Lymphadenopathy:      Cervical: No cervical adenopathy.   Skin:     Coloration: Skin is not jaundiced or pale.      Comments: Bilateral groin with wound VAC   Neurological:      General: No focal deficit present.      Mental Status: He is alert and oriented to person, place, and time. Mental status is at baseline.      Cranial Nerves: No cranial nerve deficit.   Psychiatric:         Mood and Affect: Mood normal.         Behavior: Behavior normal.         Fluids    Intake/Output Summary (Last 24 hours) at 6/7/2024 0718  Last data filed at 6/7/2024 0647  Gross per 24 hour   Intake 1960 ml   Output 2800 ml   Net -840 ml       Laboratory  Recent Labs     06/05/24  0548 06/06/24  0216 06/07/24  0630   WBC 7.0 7.6 7.1   RBC 3.89* 4.01* 3.86*   HEMOGLOBIN 12.8* 13.3* 12.6*   HEMATOCRIT 37.5* 39.3* 37.5*   MCV 96.4 98.0* 97.2   MCH 32.9 33.2* 32.6   MCHC 34.1 33.8 33.6   RDW 48.8 49.1 49.1   PLATELETCT 301 285 290   MPV 9.3 10.0 9.5     Recent Labs     06/05/24  0548 06/06/24  0216 06/07/24  0630   SODIUM 134* 135 137   POTASSIUM 4.1 4.3 4.2   CHLORIDE 102 101 103   CO2 22 22 24   GLUCOSE 94 97 100*   BUN 20 19 19   CREATININE 1.07 1.02 1.08   CALCIUM 8.5 8.6 8.8                   Imaging  IR-PICC LINE PLACEMENT W/ GUIDANCE > AGE 5   Final Result                   Ultrasound-guided PICC placement performed by qualified nursing staff as    above.          EC-ECHOCARDIOGRAM COMPLETE W/ CONT   Final Result      EC-ECHOCARDIOGRAM COMPLETE W/O CONT         DX-CHEST-PORTABLE (1 VIEW)   Final Result      1.  Trace LEFT pleural effusion   2.  Decreased LEFT peripheral pulmonary opacity which could be atelectasis or pneumonia      CT-CTA COMPLETE THORACOABDOMINAL AORTA   Final Result      1.  Type B aortic dissection status post thoracic stent graft. Dissection flap is unchanged extending from just beyond the left subclavian artery origin into the bilateral external iliac arteries.   2.  Proximal descending thoracic aortic aneurysm measuring 4.5 x 4.5 cm.   3.  Status post bilateral common iliac artery stent placement and femorofemoral bypass. Stents and distal femoral arteries appear patent.   4.  Mild stranding and multiple foci of gas in the lower anterior abdominal wall without focal drainable fluid collection just chest abscess.   5.  Redemonstrated extension of the abdominal aortic dissection into the celiac artery which supplied by both the true and false lumen. Distal vessels appear patent.   6.  Diffuse bilateral tree-in-bud opacities which could be seen in setting edema and/or infection.   7.  15 mm lingular pulmonary nodule.   8.  Trace left pleural effusion.   9.  Nonobstructive bilateral renal calculi.      Otilio Type B aortic dissection      Fleischner Society pulmonary nodule recommendations:   Low and High Risk: Consider CT at 3 months, PET/CT, or tissue sampling.      Low Risk - Minimal or absent history of smoking and of other known risk factors.      High Risk - History of smoking or of other known risk factors.      Note: These recommendations do not apply to lung cancer screening, patients with immunosuppression, or patients with known primary cancer.      Fleischner Society 2017 Guidelines for Management of Incidentally Detected Pulmonary Nodules in Adults                IR-ABDOMINAL AORTA-WITH RUNOFF    (Results Pending)        Assessment/Plan  * Wound infection- (present on admission)  Assessment & Plan  6/7/2024  Pustular drainage from his groin wound  Staples removed in the ER  Pain control  Vancomycin and augmentin  Follow-up on wound cultures and blood cultures  Vascular surgery following and took patient to OR  Infectious disease consulting     --- Continue vancomycin and will stop Zosyn, recommend 6 weeks antibiotic course and will continue vancomycin - potentially will do 4 weeks of IV with either vancomycin or daptomycin (end 6/28/24) and then transition to po linezolid 600 mg bid for the final 2 weeks (end 7/12/24)     --- The patient is very deep bilateral groin wounds and has had recent extensive vascular surgery and stenting.  He is at risk for superinfection of the area which could possibly extend into the stenting - due to this will start Augmentin 875/125 twice daily as a preventative measure and continue for a 6-week course, end 7/12/24     Acute diastolic heart failure (HCC)  Assessment & Plan  6/7/2024  Presents decompensated  IV Lasix 40 3x daily  As of 6/4 net negative 14/9 liters since admit.  Strict ins and outs and daily weights  Monitor on telemetry    Acute respiratory failure with hypoxia (HCC)  Assessment & Plan  6/7/2024  On room air  Nocturnal bipap    Tobacco abuse- (present on admission)  Assessment & Plan  Spent approx 5 mins on Tobacco cessation education . Discussed options of nicotine patch, medical treatment with wellbutrin and chantix. Discussed the benefits of quitting smoking and risks of continued smoking including cardiovascular disease, cancer and COPD.   Code 42530  -I have ordered nicotine replacement therapy    Acquired hypothyroidism- (present on admission)  Assessment & Plan  6/7/2024  Continue levothyroxine  5/25/24 TSH:7.4 FT4:0.83  Synthroid 100mcg daily    Morbid obesity due to excess calories (HCC)- (present on  admission)  Assessment & Plan  Body mass index is 47.23 kg/m².  Encourage future lifestyle modifications.    Dissection of aorta (HCC)- (present on admission)  Assessment & Plan  6/7/2024  status post emergent endovascular repair of descending thoracic aortic type B dissection with placement of thoracic stent graft  Patient also underwent iliac stenting and femoral to femoral artery bypass  Pain control  Monitor blood pressure  He has multiple stents and graft placement.    Not on asa at this time.  Control lipids     Hypertension- (present on admission)  Assessment & Plan  6/7/2024  Continue home blood pressure medications  Amlodipine, coreg, lasix  Wean lasix based on renal function and vitals.         VTE prophylaxis: Heparin    I have performed a physical exam and reviewed and updated ROS and Plan today (6/7/2024). In review of yesterday's note (6/6/2024), there are no changes except as documented above.      Greater than 51 minutes spent prepping to see patient (e.g. review of tests) obtaining and/or reviewing separately obtained history. Performing a medically appropriate examination and/ evaluation.  Counseling and educating the patient/family/caregiver.  Ordering medications, tests, or procedures.  Referring and communicating with other health care professionals.  Documenting clinical information in EPIC.  Independently interpreting results and communicating results to patient/family/caregiver.  Care coordination.

## 2024-06-07 NOTE — CARE PLAN
The patient is Stable - Low risk of patient condition declining or worsening    Shift Goals  Clinical Goals: abx, pain control, wound vac maintainence  Patient Goals: rest  Family Goals: STEVE    Progress made toward(s) clinical / shift goals:  patients pain was managable through the day with rest. PICC was placed today for ABX, wound Vac cannister on the left was changed once today. Patient should be going home tomorrow  Problem: Care Map:  Admission Optimal Outcome for the Heart Failure Patient  Goal: Admission:  Optimal Care of the heart failure patient  Outcome: Progressing     Problem: Care Map:  Day of Discharge Optimal Outcome for the Heart Failure Patient  Goal: Day of Discharge:  Optimal Care of the heart failure patient  Outcome: Progressing     Problem: Knowledge Deficit - Standard  Goal: Patient and family/care givers will demonstrate understanding of plan of care, disease process/condition, diagnostic tests and medications  Outcome: Progressing     Problem: Fall Risk  Goal: Patient will remain free from falls by calling before getting out of bed, threaded socks in place, frame alarm and wrist band in place.  Outcome: Progressing     Problem: Pain - Standard  Goal: Alleviation of pain or a reduction in pain to the patient’s comfort goal  Outcome: Progressing       Patient is not progressing towards the following goals:

## 2024-06-07 NOTE — PROGRESS NOTES
Assumed care of patient at 1900. Received report from day RN. Patient A&Ox4, on RA, Reporting a pain level of 2/10, declines interventions. Wound vacs in place to bilateral groin. PICC line in place to L brachial. Call light within reach, belongings within reach, fall precautions in place, bed in lowest position. Patient does not have any other needs at this time.     POC was discussed with patient. All questions were answered. Patient verbalized understanding.

## 2024-06-07 NOTE — CARE PLAN
The patient is Stable - Low risk of patient condition declining or worsening    Shift Goals  Clinical Goals: Pt to receive IV abx per MAR. Pt's wound vac output to be monitored throughout shift.  Patient Goals: Rest  Family Goals: STEVE    Progress made toward(s) clinical / shift goals:      Pt receiving IV vancomycin and PO Augmentin q12h per MAR to treat infection. Both of wound vacs have serosanguinous output and are being monitored throughout shift. Pt remains free from falls and demonstrates appropriate use of call light for needs.     Problem: Care Map:  Day of Discharge Optimal Outcome for the Heart Failure Patient  Goal: Day of Discharge:  Optimal Care of the heart failure patient  Outcome: Progressing     Problem: Knowledge Deficit - Standard  Goal: Patient and family/care givers will demonstrate understanding of plan of care, disease process/condition, diagnostic tests and medications  Outcome: Progressing     Problem: Fall Risk  Goal: Patient will remain free from falls  Outcome: Progressing       Patient is not progressing towards the following goals:

## 2024-06-07 NOTE — WOUND TEAM
[facility]  Inpatient Services  Wound and Skin Care Follow-up    Admission Date: 5/30/2024     Last order of IP CONSULT TO WOUND CARE was found on 6/5/2024 from Hospital Encounter on 5/30/2024     HPI, PMH, SH: Reviewed    Past Surgical History:   Procedure Laterality Date    FEMORAL FEMORAL BYPASS N/A 5/31/2024    Procedure: REMOVAL OF FEMORAL-FEMORAL BYPASS GRAFT;  Surgeon: Vishal Alcantara M.D.;  Location: SURGERY [facility];  Service: Vascular    ANGIOGRAM N/A 5/31/2024    Procedure: ANGIOGRAM WITH STENT PLACEMENT;  Surgeon: Vishal Alcantara M.D.;  Location: SURGERY [facility];  Service: Vascular    FLAP GRAFT Right 5/31/2024    Procedure: RIGHT SARTORIUS MUSCLE GRAFT;  Surgeon: Vishal Alcantara M.D.;  Location: SURGERY [facility];  Service: Vascular    APPLICATION OR REPLACEMENT, WOUND VAC Bilateral 5/31/2024    Procedure: APPLICATION OR REPLACEMENT, WOUND VAC;  Surgeon: Vishal Alcantara M.D.;  Location: SURGERY [facility];  Service: Vascular    ABDOMINAL AORTIC ANEURYSM Bilateral 5/21/2024    Procedure: AORTIC DISSECTION REPAIR;  Surgeon: Gagandeep Arnett M.D.;  Location: SURGERY [facility];  Service: General    ECHOCARDIOGRAM, TRANSESOPHAGEAL, INTRAOPERATIVE N/A 5/21/2024    Procedure: ECHOCARDIOGRAM, TRANSESOPHAGEAL, INTRAOPERATIVE;  Surgeon: Gagandeep Arnett M.D.;  Location: SURGERY [facility];  Service: General    FEMORAL FEMORAL BYPASS Bilateral 5/21/2024    Procedure: CREATION, BYPASS, ARTERIAL, FEMORAL TO FEMORAL, USING GRAFT;  Surgeon: Gagandeep Arnett M.D.;  Location: SURGERY [facility];  Service: General     Social History     Tobacco Use    Smoking status: Every Day     Current packs/day: 5.00     Average packs/day: 5.0 packs/day (0.2 ttl pk-yrs)     Types: Cigarettes     Start date: 5/20/2024    Smokeless tobacco: Former    Tobacco comments:     Pt reports 1-2 cigs per day.    Substance Use Topics    Alcohol use: Yes     Comment: [facility] beer     Chief Complaint   Patient  presents with    Chest Pain     Initial complaint.  Currently 0/10.     Shortness of Breath    Dizziness    Groin Pain     With testicular edema.    Wound Infection     Possible, at surgical site supra pubic region.       Diagnosis: Wound infection [T14.8XXA, L08.9]    Unit where seen by Wound Team: S532/02     WOUND FOLLOW UP RELATED TO:  Bgroin VAC change       WOUND TEAM PLAN OF CARE - Frequency of Follow-up:   Nursing to follow dressing orders written for wound care. Contact wound team if area fails to progress, deteriorates or with any questions/concerns if something comes up before next scheduled follow up (See below as to whether wound is following and frequency of wound follow up)  Dressing changes by wound team:                   NPWT change 3 times weekly -      WOUND HISTORY:       Patient had an aortic dissection and surgeons were able to repair.  Bilateral angioplasty and stent placement of common iliac.  Patient discharged home, and returned with groin site infections.  Patient taken to surgery with Dr. Alcantara on 5/31/24 and placed wound vac.      6/6/24 Dr Ahmadi recommending regular VAC for B groin wounds           WOUND ASSESSMENT/LDA          Negative Pressure Wound Therapy 05/31/24 Surgical Groin Right (Active)   Wound Image   06/03/24 1200   NPWT Pump Mode / Pressure Setting 125 mmHg;Continuous 06/07/24 1100   Dressing Type Black Foam (Regular) 06/07/24 1100   Number of Foam Pieces Used 2 06/05/24 1300   Canister Changed Yes 06/05/24 1100   Output (mL) 150 mL 06/06/24 0418   NEXT Dressing Change/Treatment Date 06/07/24 06/06/24 2100   VAC VeraFlo Irrigant Normal Saline 06/06/24 2100   VAC VeraFlo Soak Time (mins) 5 06/06/24 2100   VAC VeraFlo Instill Volume (ml) 24 06/06/24 2100   VAC VeraFlo - Therapy Time (hrs) 2 06/06/24 2100   VAC VeraFlo Pressure (mm/Hg) 125 mmHg 06/06/24 2100       Negative Pressure Wound Therapy 06/03/24 Groin Left (Active)   Wound Image   06/03/24 1200   NPWT Pump Mode /  Pressure Setting 125 mmHg;Continuous 06/07/24 1100   Dressing Type Black Foam (Regular) 06/07/24 1100   Number of Foam Pieces Used 2 06/05/24 1300   Canister Changed Yes 06/05/24 1542   Output (mL) 100 mL 06/06/24 0418   NEXT Dressing Change/Treatment Date 06/07/24 06/06/24 2100   VAC VeraFlo Irrigant Normal Saline 06/06/24 2100   VAC VeraFlo Soak Time (mins) 5 06/06/24 2100   VAC VeraFlo Instill Volume (ml) 20 06/06/24 2100   VAC VeraFlo - Therapy Time (hrs) 2 06/06/24 2100   VAC VeraFlo Pressure (mm/Hg) 125 mmHg 06/06/24 2100           Wound 06/03/24 Full Thickness Wound Groin Right (Active)   Wound Image    06/03/24 1200   Site Assessment STEVE 06/06/24 2100   Periwound Assessment STEVE 06/06/24 1000   Margins STEVE 06/06/24 1000   Closure Secondary intention 06/06/24 2100   Drainage Amount Small 06/07/24 1100   Drainage Description Serosanguineous 06/07/24 1100   Treatments Cleansed;Nonselective debridement;Site care;Offloading 06/05/24 1300   Wound Cleansing Approved Wound Cleanser 06/05/24 1300   Periwound Protectant No-sting Skin Prep;Paste Ring;Drape 06/05/24 1300   Dressing Status Clean;Dry;Intact 06/06/24 2100   Dressing Changed Changed 06/07/24 1100   Dressing Cleansing/Solutions Normal Saline 06/06/24 2100   Dressing Options Wound Vac 06/07/24 1100   Dressing Change/Treatment Frequency Monday, Wednesday, Friday, and As Needed 06/07/24 1100   NEXT Dressing Change/Treatment Date 06/10/24 06/07/24 1100   NEXT Weekly Photo (Inpatient Only) 06/14/24 06/07/24 1100   Wound Team Following 3x Weekly 06/05/24 1300   Non-staged Wound Description Full thickness 06/05/24 1300   Wound Length (cm) 3 cm 06/03/24 1200   Wound Width (cm) 12.5 cm 06/03/24 1200   Wound Depth (cm) 4 cm 06/03/24 1200   Wound Surface Area (cm^2) 37.5 cm^2 06/03/24 1200   Wound Volume (cm^3) 150 cm^3 06/03/24 1200   Wound Bed Granulation (%) 60 % 06/07/24 1100   Wound Bed Slough (%) 40 % 06/07/24 1100   Shape oval 06/05/24 1300   Wound Odor None  06/07/24 1100   WOUND NURSE ONLY - Time Spent with Patient (mins) 60 06/07/24 1100       Wound 06/03/24 Full Thickness Wound Groin Left (Active)   Wound Image    06/03/24 1200   Site Assessment STEVE 06/06/24 2100   Periwound Assessment STEVE 06/06/24 1000   Margins STEVE 06/06/24 1000   Closure Secondary intention 06/06/24 2100   Drainage Amount Small 06/07/24 1100   Drainage Description Serosanguineous 06/07/24 1100   Treatments Cleansed;Nonselective debridement;Site care 06/05/24 1300   Wound Cleansing Approved Wound Cleanser 06/05/24 1300   Periwound Protectant No-sting Skin Prep;Paste Ring;Drape 06/05/24 1300   Dressing Status Clean;Dry;Intact 06/06/24 2100   Dressing Changed Changed 06/07/24 1100   Dressing Cleansing/Solutions Normal Saline 06/06/24 2100   Dressing Options Wound Vac 06/07/24 1100   Dressing Change/Treatment Frequency Monday, Wednesday, Friday, and As Needed 06/07/24 1100   NEXT Dressing Change/Treatment Date 06/10/24 06/07/24 1100   NEXT Weekly Photo (Inpatient Only) 06/14/24 06/07/24 1100   Wound Team Following 3x Weekly 06/05/24 1300   Non-staged Wound Description Full thickness 06/05/24 1300   Wound Length (cm) 4 cm 06/03/24 1200   Wound Width (cm) 11 cm 06/03/24 1200   Wound Depth (cm) 3.5 cm 06/03/24 1200   Wound Surface Area (cm^2) 44 cm^2 06/03/24 1200   Wound Volume (cm^3) 154 cm^3 06/03/24 1200   Wound Bed Granulation (%) 70 % 06/07/24 1100   Wound Bed Slough (%) 30 % 06/07/24 1100   Tunneling (cm) 3.5 cm 06/03/24 1200   Tunneling Clock Position of Wound 2 06/03/24 1200   Shape oval 06/03/24 1200   Wound Odor None 06/07/24 1100           Vascular:    NIRAJ:   No results found.    Lab Values:    Lab Results   Component Value Date/Time    WBC 7.1 06/07/2024 06:30 AM    RBC 3.86 (L) 06/07/2024 06:30 AM    HEMOGLOBIN 12.6 (L) 06/07/2024 06:30 AM    HEMATOCRIT 37.5 (L) 06/07/2024 06:30 AM    SEDRATEWES 16 05/31/2024 12:27 AM    HBA1C 5.0 05/30/2024 01:05 PM         Culture Results show:  Recent  Results (from the past 720 hour(s))   CULTURE WOUND W/ GRAM STAIN    Collection Time: 05/30/24  4:35 PM    Specimen: Exudate; Wound   Result Value Ref Range    Significant Indicator POS (POS)     Source WND     Site LLQ Abdomen     Culture Result Light growth usual skin eliza. (A)     Gram Stain Result Many WBCs.  No organisms seen.       Culture Result (A)      Corynebacterium amycolatum  Moderate growth  Susceptibilities in progress         Pain Level/Medicated:  PO pain medications administered by bedside RN 60 prior and IV pain medications administered by bedside RN 5 prior (Pt educated that IV medication will not be available on outpatient basis)       INTERVENTIONS BY WOUND TEAM:  Performed standard wound care which includes appropriate positioning, dressing removal and non-selective debridement. Pictures and measurements obtained weekly if/when required.    Wound:  B groin  Preparation for Dressing removal: Removed without difficulty  Cleansed/Non-selectively Debrided with:  Wound cleanser and Gauze  Non-Excisional Conservative Sharp debridement: Slough debrided away using scissors and forceps < 20cm2 debrided down to granulation tissue.  No Bleeding noted.  Michell wound: Cleansed with Wound cleanser and Gauze, Prepped with No Sting and Drape  Primary Dressing:  regular blackfoam and drape and trac pad  R groin wound debrided, B groin VAC placement    Advanced Wound Care Discharge Planning  Number of Clinicians necessary to complete wound care: 1  Is patient requiring IV pain medications for dressing changes:  No   Length of time for dressing change 60 min. (This does not include chart review, pre-medication time, set up, clean up or time spent charting.)    Interdisciplinary consultation: Patient, Bedside RN (), .  Dr Ahmadi bedside, confirmed regular NPWT to wounds  EVALUATION / RATIONALE FOR TREATMENT:     Date:  06/07/24  Wound Status:      Pt's B wounds with some slough, desicated adipose and some granular  tissue.  NPWT will assist with granular tissue formation.      Date:  06/05/24  Wound Status:  Wound progressing as expected    R. Groin with adherent slough, L groin improving with more red tissue.  No odor present.  Changed to normal saline per Dr. Ahmadi and then change patient to regular wound vac Friday.    Date:  06/03/24  Wound Status:  Initial evaluation    Bilateral groins are full thickness, adipose tissue present.   VF NPWT applied to assist with mechanical debridement, cleansing the wound bed, increase oxygenation and granulation to the area and healing the wound by secondary intention.       Bilateral heels: intact offloaded with adhesive foam  Sacrum: intact            Goals:   100% granular wound in 1 week    NURSING PLAN OF CARE ORDERS:      NUTRITION   PREVENTATIVE INTERVENTIONS:   Q shift Tano - performed per nursing policy  Q shift pressure point assessments - performed per nursing policy        Anticipated discharge plans:  Skilled Nursing        Vac Discharge Needs:  Vac Discharge plan is purely a recommendation from wound team and not a requirement for discharge unless otherwise stated by physician.  Regular Vac in use and continued at discharge

## 2024-06-07 NOTE — PROGRESS NOTES
VASCULAR SURGERY PROGRESS NOTE      Awake, no complaints.  New wound vacs were just placed.  Wounds are clean, per wound care nurse.  AF, BP still fluctuates from 150's to 120's systolic.     General: Morbidly obese male in no apparent distress.  Abdomen: Soft, nondistended, obese.  Lower extremities: Vac's in place, no bleeding.  Feet are warm, well-perfused.  1+ edema.     Labs: Reviewed.        Assessment:   1) Status post removal of infected femoral-femoral bypass, angiogram with stent placement, right sartorius muscle flap, and wound VAC placement.  2) Hypertension.     Plan:  Doing well.  Continue wound vac.  Antibiotics as per ID.  Keep systolic blood pressure 140 or less.      OK to be transferred to SNF from vascular standpoint once BP is under control.     Discussed with patient.  All questions were answered.

## 2024-06-08 LAB
ANION GAP SERPL CALC-SCNC: 8 MMOL/L (ref 7–16)
BASOPHILS # BLD AUTO: 1 % (ref 0–1.8)
BASOPHILS # BLD: 0.07 K/UL (ref 0–0.12)
BUN SERPL-MCNC: 19 MG/DL (ref 8–22)
CALCIUM SERPL-MCNC: 8.6 MG/DL (ref 8.5–10.5)
CHLORIDE SERPL-SCNC: 102 MMOL/L (ref 96–112)
CO2 SERPL-SCNC: 25 MMOL/L (ref 20–33)
CREAT SERPL-MCNC: 1.11 MG/DL (ref 0.5–1.4)
EOSINOPHIL # BLD AUTO: 0.43 K/UL (ref 0–0.51)
EOSINOPHIL NFR BLD: 6 % (ref 0–6.9)
ERYTHROCYTE [DISTWIDTH] IN BLOOD BY AUTOMATED COUNT: 49.7 FL (ref 35.9–50)
GFR SERPLBLD CREATININE-BSD FMLA CKD-EPI: 80 ML/MIN/1.73 M 2
GLUCOSE SERPL-MCNC: 111 MG/DL (ref 65–99)
HCT VFR BLD AUTO: 37.4 % (ref 42–52)
HGB BLD-MCNC: 12.3 G/DL (ref 14–18)
IMM GRANULOCYTES # BLD AUTO: 0.05 K/UL (ref 0–0.11)
IMM GRANULOCYTES NFR BLD AUTO: 0.7 % (ref 0–0.9)
LYMPHOCYTES # BLD AUTO: 0.95 K/UL (ref 1–4.8)
LYMPHOCYTES NFR BLD: 13.2 % (ref 22–41)
MAGNESIUM SERPL-MCNC: 2 MG/DL (ref 1.5–2.5)
MCH RBC QN AUTO: 32.5 PG (ref 27–33)
MCHC RBC AUTO-ENTMCNC: 32.9 G/DL (ref 32.3–36.5)
MCV RBC AUTO: 98.9 FL (ref 81.4–97.8)
MONOCYTES # BLD AUTO: 1.06 K/UL (ref 0–0.85)
MONOCYTES NFR BLD AUTO: 14.7 % (ref 0–13.4)
NEUTROPHILS # BLD AUTO: 4.65 K/UL (ref 1.82–7.42)
NEUTROPHILS NFR BLD: 64.4 % (ref 44–72)
NRBC # BLD AUTO: 0 K/UL
NRBC BLD-RTO: 0 /100 WBC (ref 0–0.2)
PHOSPHATE SERPL-MCNC: 3 MG/DL (ref 2.5–4.5)
PLATELET # BLD AUTO: 273 K/UL (ref 164–446)
PMV BLD AUTO: 9.5 FL (ref 9–12.9)
POTASSIUM SERPL-SCNC: 4.2 MMOL/L (ref 3.6–5.5)
RBC # BLD AUTO: 3.78 M/UL (ref 4.7–6.1)
SODIUM SERPL-SCNC: 135 MMOL/L (ref 135–145)
WBC # BLD AUTO: 7.2 K/UL (ref 4.8–10.8)

## 2024-06-08 PROCEDURE — A9270 NON-COVERED ITEM OR SERVICE: HCPCS | Performed by: INTERNAL MEDICINE

## 2024-06-08 PROCEDURE — A9270 NON-COVERED ITEM OR SERVICE: HCPCS | Performed by: SURGERY

## 2024-06-08 PROCEDURE — 700111 HCHG RX REV CODE 636 W/ 250 OVERRIDE (IP): Performed by: INTERNAL MEDICINE

## 2024-06-08 PROCEDURE — 700102 HCHG RX REV CODE 250 W/ 637 OVERRIDE(OP): Performed by: SURGERY

## 2024-06-08 PROCEDURE — 700102 HCHG RX REV CODE 250 W/ 637 OVERRIDE(OP): Performed by: HOSPITALIST

## 2024-06-08 PROCEDURE — 83735 ASSAY OF MAGNESIUM: CPT

## 2024-06-08 PROCEDURE — 85025 COMPLETE CBC W/AUTO DIFF WBC: CPT

## 2024-06-08 PROCEDURE — 80048 BASIC METABOLIC PNL TOTAL CA: CPT

## 2024-06-08 PROCEDURE — 700111 HCHG RX REV CODE 636 W/ 250 OVERRIDE (IP): Mod: JZ

## 2024-06-08 PROCEDURE — 36415 COLL VENOUS BLD VENIPUNCTURE: CPT

## 2024-06-08 PROCEDURE — A9270 NON-COVERED ITEM OR SERVICE: HCPCS | Performed by: HOSPITALIST

## 2024-06-08 PROCEDURE — 700102 HCHG RX REV CODE 250 W/ 637 OVERRIDE(OP): Performed by: INTERNAL MEDICINE

## 2024-06-08 PROCEDURE — 770006 HCHG ROOM/CARE - MED/SURG/GYN SEMI*

## 2024-06-08 PROCEDURE — 99233 SBSQ HOSP IP/OBS HIGH 50: CPT | Performed by: INTERNAL MEDICINE

## 2024-06-08 PROCEDURE — 700111 HCHG RX REV CODE 636 W/ 250 OVERRIDE (IP): Mod: JZ | Performed by: HOSPITALIST

## 2024-06-08 PROCEDURE — 700105 HCHG RX REV CODE 258: Performed by: INTERNAL MEDICINE

## 2024-06-08 PROCEDURE — 700111 HCHG RX REV CODE 636 W/ 250 OVERRIDE (IP): Performed by: SURGERY

## 2024-06-08 PROCEDURE — 84100 ASSAY OF PHOSPHORUS: CPT

## 2024-06-08 RX ORDER — DIPHENHYDRAMINE HYDROCHLORIDE 50 MG/ML
25 INJECTION INTRAMUSCULAR; INTRAVENOUS EVERY 6 HOURS PRN
Status: DISCONTINUED | OUTPATIENT
Start: 2024-06-08 | End: 2024-06-11 | Stop reason: HOSPADM

## 2024-06-08 RX ORDER — FAMOTIDINE 20 MG/1
20 TABLET, FILM COATED ORAL 2 TIMES DAILY
Status: DISCONTINUED | OUTPATIENT
Start: 2024-06-08 | End: 2024-06-11 | Stop reason: HOSPADM

## 2024-06-08 RX ORDER — CALCIUM CARBONATE 500 MG/1
500 TABLET, CHEWABLE ORAL 3 TIMES DAILY PRN
Status: DISCONTINUED | OUTPATIENT
Start: 2024-06-08 | End: 2024-06-11 | Stop reason: HOSPADM

## 2024-06-08 RX ORDER — LOSARTAN POTASSIUM 50 MG/1
100 TABLET ORAL
Status: DISCONTINUED | OUTPATIENT
Start: 2024-06-09 | End: 2024-06-11 | Stop reason: HOSPADM

## 2024-06-08 RX ADMIN — AMOXICILLIN AND CLAVULANATE POTASSIUM 1 TABLET: 875; 125 TABLET, FILM COATED ORAL at 17:49

## 2024-06-08 RX ADMIN — HEPARIN SODIUM 5000 UNITS: 5000 INJECTION, SOLUTION INTRAVENOUS; SUBCUTANEOUS at 06:27

## 2024-06-08 RX ADMIN — FUROSEMIDE 40 MG: 10 INJECTION, SOLUTION INTRAMUSCULAR; INTRAVENOUS at 06:26

## 2024-06-08 RX ADMIN — AMOXICILLIN AND CLAVULANATE POTASSIUM 1 TABLET: 875; 125 TABLET, FILM COATED ORAL at 06:24

## 2024-06-08 RX ADMIN — FAMOTIDINE 20 MG: 20 TABLET, FILM COATED ORAL at 17:49

## 2024-06-08 RX ADMIN — CLONIDINE HYDROCHLORIDE 0.1 MG: 0.1 TABLET ORAL at 00:08

## 2024-06-08 RX ADMIN — GABAPENTIN 100 MG: 100 CAPSULE ORAL at 06:24

## 2024-06-08 RX ADMIN — METHOCARBAMOL TABLETS 500 MG: 500 TABLET, COATED ORAL at 17:49

## 2024-06-08 RX ADMIN — HEPARIN SODIUM 5000 UNITS: 5000 INJECTION, SOLUTION INTRAVENOUS; SUBCUTANEOUS at 21:11

## 2024-06-08 RX ADMIN — FLUTICASONE FUROATE, UMECLIDINIUM BROMIDE AND VILANTEROL TRIFENATATE 1 PUFF: 100; 62.5; 25 POWDER RESPIRATORY (INHALATION) at 06:26

## 2024-06-08 RX ADMIN — VANCOMYCIN HYDROCHLORIDE 1000 MG: 5 INJECTION, POWDER, LYOPHILIZED, FOR SOLUTION INTRAVENOUS at 21:13

## 2024-06-08 RX ADMIN — GABAPENTIN 100 MG: 100 CAPSULE ORAL at 11:19

## 2024-06-08 RX ADMIN — CARVEDILOL 25 MG: 25 TABLET, FILM COATED ORAL at 08:03

## 2024-06-08 RX ADMIN — AMLODIPINE BESYLATE 10 MG: 10 TABLET ORAL at 06:25

## 2024-06-08 RX ADMIN — CLONIDINE HYDROCHLORIDE 0.1 MG: 0.1 TABLET ORAL at 06:25

## 2024-06-08 RX ADMIN — METHOCARBAMOL TABLETS 500 MG: 500 TABLET, COATED ORAL at 14:15

## 2024-06-08 RX ADMIN — DIPHENHYDRAMINE HYDROCHLORIDE 25 MG: 50 INJECTION, SOLUTION INTRAMUSCULAR; INTRAVENOUS at 01:41

## 2024-06-08 RX ADMIN — METHOCARBAMOL TABLETS 500 MG: 500 TABLET, COATED ORAL at 21:12

## 2024-06-08 RX ADMIN — LOSARTAN POTASSIUM 50 MG: 50 TABLET, FILM COATED ORAL at 06:24

## 2024-06-08 RX ADMIN — FAMOTIDINE 20 MG: 20 TABLET, FILM COATED ORAL at 06:24

## 2024-06-08 RX ADMIN — GABAPENTIN 100 MG: 100 CAPSULE ORAL at 17:49

## 2024-06-08 RX ADMIN — HEPARIN SODIUM 5000 UNITS: 5000 INJECTION, SOLUTION INTRAVENOUS; SUBCUTANEOUS at 14:15

## 2024-06-08 RX ADMIN — METHOCARBAMOL TABLETS 500 MG: 500 TABLET, COATED ORAL at 08:03

## 2024-06-08 RX ADMIN — LEVOTHYROXINE SODIUM 100 MCG: 0.1 TABLET ORAL at 06:24

## 2024-06-08 RX ADMIN — VANCOMYCIN HYDROCHLORIDE 1000 MG: 5 INJECTION, POWDER, LYOPHILIZED, FOR SOLUTION INTRAVENOUS at 11:20

## 2024-06-08 RX ADMIN — CARVEDILOL 25 MG: 25 TABLET, FILM COATED ORAL at 17:49

## 2024-06-08 ASSESSMENT — PAIN DESCRIPTION - PAIN TYPE
TYPE: ACUTE PAIN

## 2024-06-08 ASSESSMENT — ENCOUNTER SYMPTOMS
FEVER: 0
SENSORY CHANGE: 0
NERVOUS/ANXIOUS: 0
DIARRHEA: 0
MYALGIAS: 1
ABDOMINAL PAIN: 0
SHORTNESS OF BREATH: 0
NAUSEA: 0
PALPITATIONS: 0

## 2024-06-08 ASSESSMENT — LIFESTYLE VARIABLES: SUBSTANCE_ABUSE: 0

## 2024-06-08 NOTE — PROGRESS NOTES
"                 VASCULAR SURGERY               Inpatient Progress Note  _________________________________    Vitals (6/8/2024)  BP (!) 143/84   Pulse 72   Temp 36.2 °C (97.1 °F) (Temporal)   Resp 20   Ht 1.778 m (5' 10\")   Wt (!) 149 kg (329 lb 2.4 oz)   SpO2 97%   BMI 47.23 kg/m²   _________________________________    5/21/24 TEVAR and fem-fem for Type B dissection (Melquiades)    5/31/24 Removal of femfem, bilateral iliac stenting, right sartorius muscle flap, bilateral groin vacs (Maricruz/Melquiades)       6/8/2024  Feeling well, no specific complaints.  Wound vacs are intact.  Images show that the wounds are healing well without evidence of worsening infection.  Lower extremities are well-perfused.  Abdomen is benign.  Overall the patient is recovering well.  The groin wounds will need to heal entirely by secondary intention with wound VAC therapy which could take another few weeks.  Otherwise no additional imaging or intervention needed from vascular standpoint.            Appreciate Hospitalist service's support  Following along        Vishal Alcantara MD  [facility]  [place] preferred or call my office [phone]  __________________________________________________________________  Patient:Rene Kelley   MRN:[id number]   CSN:[id number]    "

## 2024-06-08 NOTE — PROGRESS NOTES
Hospital Medicine Daily Progress Note    Date of Service  6/8/2024    Chief Complaint  Groin pain and sob    Hospital Course  Rene Kelley is a 53 y.o. male with morbid obesity, HTN, smoker, CALOS, aortic dissection s/p aortic stent graft, right exernal iliac stent placement and left common iliac. He had a left to right femoral-femoral artery bypass graft.  He was admitted with low grade fever and leg/scrotal swelling admitted 5/30/2024 with a concern of surgical groin wound site drainage/infection.  Vascular surgeon and ID were consulted.     Status post removal of infected femoral-femoral bypass and placement of wound VAC on 5/31.  Or cultures grew Corynebacterium.  MRSA nares negative.  ID recommended Augmentin and vancomycin for 6 weeks.    Patient did have difficult to control hypertension requiring losartan in addition to his home amlodipine, and carvedilol.  Blood pressure regimen was actively being titrated during admission.    Interval Problem Update    Patient was seen and examined at bedside.  I have personally reviewed and interpreted vitals, labs, and imaging.    6/5.  Afebrile.  Mild hypertension.  On room air.  Patient feels little groggy.  Just received pain meds for recent dressing changes.  Denies fevers, chills, chest pains, shortness of breath.  Has some intermittent pain around his groin and his back.  Discussed with social work.  [facility] for [facility] and wound VAC.  Counseled on smoking cessation  6/6.  Afebrile.  Hypertensive this morning.  On room air.  Patient had blood pressures in the 170s this morning.  Patient states never taking his blood pressure when he was actively nauseous and vomiting.  Denies fever, chills, chest pain, shortness of breath.  Started on losartan in addition to amlodipine and carvedilol.  PICC placed for long-term antibiotics.  T[facility] to place [facility] for [facility] and wound vacs but will need better control of blood  "pressure.  6/7.  Afebrile.  Hypertension is improved.  On room air.  Per vascular surgery goal systolic less than 140.  Denies fever, chills, chest pain, shortness of breath.  Pain is controlled currently the patient is anxious for wound care plan for today.  Blood pressure is better controlled.  Continue titrating with increasing losartan.  6/8.  Afebrile.  Patient does have intermittent hypertension.  He attributes these to sometimes they check his blood pressure after episodes of heartburn, nausea, vomiting.  This morning he blames the coffee he had which led to sudden onset of nausea and vomiting.  Mostly his blood pressures in the 140-150s.  Increase losartan to 100 mg.  Increased dose of famotidine.  As needed Tums.  Plan for placement to the [facility] on Monday.    I have discussed this patient's plan of care and discharge plan at IDT rounds today with Case Management, Nursing, Nursing leadership, and other members of the IDT team.    Consultants/Specialty  infectious disease and vascular surgery    Code Status  Full Code    Disposition  The patient is not medically cleared for discharge to home or a post-acute facility.  Anticipate discharge to: skilled nursing facility    I have placed the appropriate orders for post-discharge needs.    Review of Systems  Review of Systems   Constitutional:  Negative for fever and malaise/fatigue.   Respiratory:  Negative for shortness of breath.    Cardiovascular:  Negative for palpitations and leg swelling.   Gastrointestinal:  Negative for abdominal pain, diarrhea and nausea.   Genitourinary:  Negative for dysuria.   Musculoskeletal:  Positive for myalgias (bilateral groin sites but \"tolerable\").   Neurological:  Negative for sensory change.   Psychiatric/Behavioral:  Negative for substance abuse. The patient is not nervous/anxious.         Physical Exam  Temp:  [36.1 °C (97 °F)-36.9 °C (98.4 °F)] 36.4 °C (97.5 °F)  Pulse:  [60-72] 61  Resp:  [12-20] 14  BP: " (118-153)/(52-83) 146/61  SpO2:  [90 %-96 %] 90 %    Physical Exam  Vitals reviewed.   Constitutional:       Appearance: Normal appearance. He is obese. He is not diaphoretic.   HENT:      Head: Normocephalic and atraumatic.      Nose: Nose normal.      Mouth/Throat:      Mouth: Mucous membranes are moist.      Pharynx: No oropharyngeal exudate.   Eyes:      General: No scleral icterus.        Right eye: No discharge.         Left eye: No discharge.      Extraocular Movements: Extraocular movements intact.      Conjunctiva/sclera: Conjunctivae normal.   Cardiovascular:      Rate and Rhythm: Normal rate and regular rhythm.      Pulses:           Radial pulses are 2+ on the right side and 2+ on the left side.        Dorsalis pedis pulses are 2+ on the right side and 2+ on the left side.      Heart sounds: No murmur heard.  Pulmonary:      Effort: Pulmonary effort is normal. No respiratory distress.      Breath sounds: Normal breath sounds. No wheezing or rales.   Abdominal:      General: Bowel sounds are normal. There is no distension.      Palpations: Abdomen is soft.      Tenderness: There is no abdominal tenderness.   Musculoskeletal:         General: No swelling.      Cervical back: Neck supple. No muscular tenderness.      Right lower leg: No edema.      Left lower leg: No edema.   Lymphadenopathy:      Cervical: No cervical adenopathy.   Skin:     Coloration: Skin is not jaundiced or pale.      Comments: Bilateral groin with wound VAC   Neurological:      General: No focal deficit present.      Mental Status: He is alert and oriented to person, place, and time. Mental status is at baseline.      Cranial Nerves: No cranial nerve deficit.   Psychiatric:         Mood and Affect: Mood normal.         Behavior: Behavior normal.         Fluids    Intake/Output Summary (Last 24 hours) at 6/8/2024 0612  Last data filed at 6/7/2024 2300  Gross per 24 hour   Intake 600 ml   Output 3275 ml   Net -2675 ml        Laboratory  Recent Labs     06/06/24  0216 06/07/24  0630 06/08/24  0150   WBC 7.6 7.1 7.2   RBC 4.01* 3.86* 3.78*   HEMOGLOBIN 13.3* 12.6* 12.3*   HEMATOCRIT 39.3* 37.5* 37.4*   MCV 98.0* 97.2 98.9*   MCH 33.2* 32.6 32.5   MCHC 33.8 33.6 32.9   RDW 49.1 49.1 49.7   PLATELETCT 285 290 273   MPV 10.0 9.5 9.5     Recent Labs     06/06/24  0216 06/07/24  0630 06/08/24  0150   SODIUM 135 137 135   POTASSIUM 4.3 4.2 4.2   CHLORIDE 101 103 102   CO2 22 24 25   GLUCOSE 97 100* 111*   BUN 19 19 19   CREATININE 1.02 1.08 1.11   CALCIUM 8.6 8.8 8.6                   Imaging  IR-PICC LINE PLACEMENT W/ GUIDANCE > AGE 5   Final Result                  Ultrasound-guided PICC placement performed by qualified nursing staff as    above.          EC-ECHOCARDIOGRAM COMPLETE W/ CONT   Final Result      EC-ECHOCARDIOGRAM COMPLETE W/O CONT         DX-CHEST-PORTABLE (1 VIEW)   Final Result      1.  Trace LEFT pleural effusion   2.  Decreased LEFT peripheral pulmonary opacity which could be atelectasis or pneumonia      CT-CTA COMPLETE THORACOABDOMINAL AORTA   Final Result      1.  Type B aortic dissection status post thoracic stent graft. Dissection flap is unchanged extending from just beyond the left subclavian artery origin into the bilateral external iliac arteries.   2.  Proximal descending thoracic aortic aneurysm measuring 4.5 x 4.5 cm.   3.  Status post bilateral common iliac artery stent placement and femorofemoral bypass. Stents and distal femoral arteries appear patent.   4.  Mild stranding and multiple foci of gas in the lower anterior abdominal wall without focal drainable fluid collection just chest abscess.   5.  Redemonstrated extension of the abdominal aortic dissection into the celiac artery which supplied by both the true and false lumen. Distal vessels appear patent.   6.  Diffuse bilateral tree-in-bud opacities which could be seen in setting edema and/or infection.   7.  15 mm lingular pulmonary nodule.   8.   Trace left pleural effusion.   9.  Nonobstructive bilateral renal calculi.      [place] Type B aortic dissection      Fleischner Society pulmonary nodule recommendations:   Low and High Risk: Consider CT at 3 months, PET/CT, or tissue sampling.      Low Risk - Minimal or absent history of smoking and of other known risk factors.      High Risk - History of smoking or of other known risk factors.      Note: These recommendations do not apply to lung cancer screening, patients with immunosuppression, or patients with known primary cancer.      Fleischner Society 2017 Guidelines for Management of Incidentally Detected Pulmonary Nodules in Adults               IR-ABDOMINAL AORTA-WITH RUNOFF    (Results Pending)        Assessment/Plan  * Wound infection- (present on admission)  Assessment & Plan  6/8/2024  Pustular drainage from his groin wound  Staples removed in the ER  Pain control  Vancomycin and augmentin  Follow-up on wound cultures and blood cultures  Vascular surgery following and took patient to OR  Infectious disease consulting     --- Continue vancomycin and will stop Zosyn, recommend 6 weeks antibiotic course and will continue vancomycin - potentially will do 4 weeks of IV with either vancomycin or daptomycin (end 6/28/24) and then transition to po linezolid 600 mg bid for the final 2 weeks (end 7/12/24)     --- The patient is very deep bilateral groin wounds and has had recent extensive vascular surgery and stenting.  He is at risk for superinfection of the area which could possibly extend into the stenting - due to this will start Augmentin 875/125 twice daily as a preventative measure and continue for a 6-week course, end 7/12/24     Acute diastolic heart failure (HCC)  Assessment & Plan  6/8/2024  Presents decompensated  IV Lasix 40 3x daily  As of 6/4 net negative 14/9 liters since admit.  Strict ins and outs and daily weights  Monitor on telemetry    Acute respiratory failure with hypoxia  ([facility])  Assessment & Plan  6/8/2024  On room air  Nocturnal bipap    Tobacco abuse- (present on admission)  Assessment & Plan  Spent approx 5 mins on Tobacco cessation education . Discussed options of nicotine patch, medical treatment with wellbutrin and chantix. Discussed the benefits of quitting smoking and risks of continued smoking including cardiovascular disease, cancer and COPD.   Code 71416  -I have ordered nicotine replacement therapy    Acquired hypothyroidism- (present on admission)  Assessment & Plan  6/8/2024  Continue levothyroxine  5/25/24 TSH:7.4 FT4:0.83  Synthroid 100mcg daily    Morbid obesity due to excess calories ([facility])- (present on admission)  Assessment & Plan  Body mass index is 47.23 kg/m².  Encourage future lifestyle modifications.    Dissection of aorta ([facility])- (present on admission)  Assessment & Plan  6/8/2024  status post emergent endovascular repair of descending thoracic aortic type B dissection with placement of thoracic stent graft  Patient also underwent iliac stenting and femoral to femoral artery bypass  Pain control  Monitor blood pressure  He has multiple stents and graft placement.    Not on asa at this time.  Control lipids     Hypertension- (present on admission)  Assessment & Plan  6/8/2024  Continue home blood pressure medications  Amlodipine, coreg, lasix  Wean lasix based on renal function and vitals.         VTE prophylaxis: Heparin    I have performed a physical exam and reviewed and updated ROS and Plan today (6/8/2024). In review of yesterday's note (6/7/2024), there are no changes except as documented above.      Greater than 50 minutes spent prepping to see patient (e.g. review of tests) obtaining and/or reviewing separately obtained history. Performing a medically appropriate examination and/ evaluation.  Counseling and educating the patient/family/caregiver.  Ordering medications, tests, or procedures.  Referring and communicating with other health care  professionals.  Documenting clinical information in EPIC.  Independently interpreting results and communicating results to patient/family/caregiver.  Care coordination.

## 2024-06-08 NOTE — CARE PLAN
The patient is Stable - Low risk of patient condition declining or worsening    Shift Goals  Clinical Goals: IV abx, wound care, pain control  Patient Goals: rest  Family Goals: STEVE    Progress made toward(s) clinical / shift goals:  patient is still reieving IV ABX. Pain wa mostly during wound care dressing change today and was managed with medication per MAR.   Problem: Care Map:  Day Before Discharge Optimal Outcome for the Heart Failure Patient  Goal: Day Before Discharge:  Optimal Care of the heart failure patient  Outcome: Progressing     Problem: Knowledge Deficit - Standard  Goal: Patient and family/care givers will demonstrate understanding of plan of care, disease process/condition, diagnostic tests and medications  Outcome: Progressing     Problem: Fall Risk  Goal: Patient will remain free from falls by calling for assistance before ambulating throughout the shift  Outcome: Progressing     Problem: Pain - Standard  Goal: Alleviation of pain or a reduction in pain to the patient’s comfort goal  Outcome: Progressing       Patient is not progressing towards the following goals:

## 2024-06-08 NOTE — PROGRESS NOTES
Received report and assumed care of patient at change of shift. Patient A&Ox4 , on RA , and reports 4/10 pain at this time. Declines medication per MAR for pain.Patient assessment completed, bed in lowest position, and call light and personal belongings within reach. Patient expressed no further needs at this time.

## 2024-06-08 NOTE — PROGRESS NOTES
Pt is a high fall risk but is refusing a bed alarm at this time. Pt educated on the importance of a bed alarm and fall precautions but pt continued to refuse bed alarm. Pt verbalized understanding of needing to use call light prior to getting out of bed and for any needs. Charge RN notified of pt's refusal.

## 2024-06-08 NOTE — CARE PLAN
The patient is Stable - Low risk of patient condition declining or worsening    Shift Goals  Clinical Goals: Pt's BP to be controlled and SBP to remain under 140. Pt to receive IV abx per MAR to treat infection. Pt to have pain controlled to 2/10 throughout shift.  Patient Goals: Sleep, discharge  Family Goals: STEVE    Progress made toward(s) clinical / shift goals:      Pt receiving IV vancomycin and PO Augmentin q12h per MAR to treat infection. Wound vac has serosanguinous output and is being monitored throughout shift. Pt remains free from falls and demonstrates appropriate use of call light for needs. Pt given prn benadryl to help with sleep. Pt not requiring interventions for pain during shift and pain remained at 2-3/10.      Patient is not progressing towards the following goals:  Pt's SBP greater than 140 and was given prn clonidine per MAR.

## 2024-06-08 NOTE — PROGRESS NOTES
Assumed care of patient at 1900. Received report from day RN. Patient A&Ox4, laying in bed watching TV, on RA, Reporting a pain level of 2/10, declines interventions. Wound vac in place to bilateral groin. PICC line in place to L brachial. Call light within reach, belongings within reach, fall precautions in place, bed in lowest position. Patient does not have any other needs at this time.      POC was discussed with patient. All questions were answered. Patient verbalized understanding.

## 2024-06-09 LAB
ANION GAP SERPL CALC-SCNC: 9 MMOL/L (ref 7–16)
BASOPHILS # BLD AUTO: 1 % (ref 0–1.8)
BASOPHILS # BLD: 0.08 K/UL (ref 0–0.12)
BUN SERPL-MCNC: 18 MG/DL (ref 8–22)
CALCIUM SERPL-MCNC: 8.6 MG/DL (ref 8.5–10.5)
CHLORIDE SERPL-SCNC: 102 MMOL/L (ref 96–112)
CO2 SERPL-SCNC: 23 MMOL/L (ref 20–33)
CREAT SERPL-MCNC: 1.03 MG/DL (ref 0.5–1.4)
EOSINOPHIL # BLD AUTO: 0.43 K/UL (ref 0–0.51)
EOSINOPHIL NFR BLD: 5.5 % (ref 0–6.9)
ERYTHROCYTE [DISTWIDTH] IN BLOOD BY AUTOMATED COUNT: 49 FL (ref 35.9–50)
GFR SERPLBLD CREATININE-BSD FMLA CKD-EPI: 87 ML/MIN/1.73 M 2
GLUCOSE SERPL-MCNC: 123 MG/DL (ref 65–99)
HCT VFR BLD AUTO: 37.4 % (ref 42–52)
HGB BLD-MCNC: 12.7 G/DL (ref 14–18)
IMM GRANULOCYTES # BLD AUTO: 0.06 K/UL (ref 0–0.11)
IMM GRANULOCYTES NFR BLD AUTO: 0.8 % (ref 0–0.9)
LYMPHOCYTES # BLD AUTO: 0.92 K/UL (ref 1–4.8)
LYMPHOCYTES NFR BLD: 11.7 % (ref 22–41)
MAGNESIUM SERPL-MCNC: 2 MG/DL (ref 1.5–2.5)
MCH RBC QN AUTO: 33 PG (ref 27–33)
MCHC RBC AUTO-ENTMCNC: 34 G/DL (ref 32.3–36.5)
MCV RBC AUTO: 97.1 FL (ref 81.4–97.8)
MONOCYTES # BLD AUTO: 0.86 K/UL (ref 0–0.85)
MONOCYTES NFR BLD AUTO: 10.9 % (ref 0–13.4)
NEUTROPHILS # BLD AUTO: 5.51 K/UL (ref 1.82–7.42)
NEUTROPHILS NFR BLD: 70.1 % (ref 44–72)
NRBC # BLD AUTO: 0 K/UL
NRBC BLD-RTO: 0 /100 WBC (ref 0–0.2)
PHOSPHATE SERPL-MCNC: 2.8 MG/DL (ref 2.5–4.5)
PLATELET # BLD AUTO: 274 K/UL (ref 164–446)
PMV BLD AUTO: 9.7 FL (ref 9–12.9)
POTASSIUM SERPL-SCNC: 4.1 MMOL/L (ref 3.6–5.5)
RBC # BLD AUTO: 3.85 M/UL (ref 4.7–6.1)
SODIUM SERPL-SCNC: 134 MMOL/L (ref 135–145)
WBC # BLD AUTO: 7.9 K/UL (ref 4.8–10.8)

## 2024-06-09 PROCEDURE — A9270 NON-COVERED ITEM OR SERVICE: HCPCS | Performed by: SURGERY

## 2024-06-09 PROCEDURE — 700105 HCHG RX REV CODE 258: Performed by: INTERNAL MEDICINE

## 2024-06-09 PROCEDURE — 84100 ASSAY OF PHOSPHORUS: CPT

## 2024-06-09 PROCEDURE — 85025 COMPLETE CBC W/AUTO DIFF WBC: CPT

## 2024-06-09 PROCEDURE — 99233 SBSQ HOSP IP/OBS HIGH 50: CPT | Performed by: INTERNAL MEDICINE

## 2024-06-09 PROCEDURE — 700102 HCHG RX REV CODE 250 W/ 637 OVERRIDE(OP): Performed by: INTERNAL MEDICINE

## 2024-06-09 PROCEDURE — A9270 NON-COVERED ITEM OR SERVICE: HCPCS | Performed by: HOSPITALIST

## 2024-06-09 PROCEDURE — A9270 NON-COVERED ITEM OR SERVICE: HCPCS | Performed by: INTERNAL MEDICINE

## 2024-06-09 PROCEDURE — 700102 HCHG RX REV CODE 250 W/ 637 OVERRIDE(OP): Performed by: SURGERY

## 2024-06-09 PROCEDURE — 83735 ASSAY OF MAGNESIUM: CPT

## 2024-06-09 PROCEDURE — 80048 BASIC METABOLIC PNL TOTAL CA: CPT

## 2024-06-09 PROCEDURE — 700111 HCHG RX REV CODE 636 W/ 250 OVERRIDE (IP): Performed by: INTERNAL MEDICINE

## 2024-06-09 PROCEDURE — 770006 HCHG ROOM/CARE - MED/SURG/GYN SEMI*

## 2024-06-09 PROCEDURE — 700111 HCHG RX REV CODE 636 W/ 250 OVERRIDE (IP): Performed by: SURGERY

## 2024-06-09 PROCEDURE — 700102 HCHG RX REV CODE 250 W/ 637 OVERRIDE(OP): Performed by: HOSPITALIST

## 2024-06-09 RX ORDER — FUROSEMIDE 40 MG/1
40 TABLET ORAL
Status: DISCONTINUED | OUTPATIENT
Start: 2024-06-10 | End: 2024-06-11 | Stop reason: HOSPADM

## 2024-06-09 RX ADMIN — AMLODIPINE BESYLATE 10 MG: 10 TABLET ORAL at 04:39

## 2024-06-09 RX ADMIN — METHOCARBAMOL TABLETS 500 MG: 500 TABLET, COATED ORAL at 08:19

## 2024-06-09 RX ADMIN — METHOCARBAMOL TABLETS 500 MG: 500 TABLET, COATED ORAL at 14:19

## 2024-06-09 RX ADMIN — METHOCARBAMOL TABLETS 500 MG: 500 TABLET, COATED ORAL at 17:53

## 2024-06-09 RX ADMIN — VANCOMYCIN HYDROCHLORIDE 1000 MG: 5 INJECTION, POWDER, LYOPHILIZED, FOR SOLUTION INTRAVENOUS at 11:39

## 2024-06-09 RX ADMIN — CARVEDILOL 25 MG: 25 TABLET, FILM COATED ORAL at 08:19

## 2024-06-09 RX ADMIN — NICOTINE TRANSDERMAL SYSTEM 21 MG: 21 PATCH, EXTENDED RELEASE TRANSDERMAL at 04:37

## 2024-06-09 RX ADMIN — GABAPENTIN 100 MG: 100 CAPSULE ORAL at 04:40

## 2024-06-09 RX ADMIN — FAMOTIDINE 20 MG: 20 TABLET, FILM COATED ORAL at 17:53

## 2024-06-09 RX ADMIN — CARVEDILOL 25 MG: 25 TABLET, FILM COATED ORAL at 17:53

## 2024-06-09 RX ADMIN — HEPARIN SODIUM 5000 UNITS: 5000 INJECTION, SOLUTION INTRAVENOUS; SUBCUTANEOUS at 04:40

## 2024-06-09 RX ADMIN — LEVOTHYROXINE SODIUM 100 MCG: 0.1 TABLET ORAL at 04:39

## 2024-06-09 RX ADMIN — DIBASIC SODIUM PHOSPHATE, MONOBASIC POTASSIUM PHOSPHATE AND MONOBASIC SODIUM PHOSPHATE 500 MG: 852; 155; 130 TABLET ORAL at 14:19

## 2024-06-09 RX ADMIN — FLUTICASONE FUROATE, UMECLIDINIUM BROMIDE AND VILANTEROL TRIFENATATE 1 PUFF: 100; 62.5; 25 POWDER RESPIRATORY (INHALATION) at 04:41

## 2024-06-09 RX ADMIN — GABAPENTIN 100 MG: 100 CAPSULE ORAL at 17:53

## 2024-06-09 RX ADMIN — AMOXICILLIN AND CLAVULANATE POTASSIUM 1 TABLET: 875; 125 TABLET, FILM COATED ORAL at 17:53

## 2024-06-09 RX ADMIN — VANCOMYCIN HYDROCHLORIDE 1000 MG: 5 INJECTION, POWDER, LYOPHILIZED, FOR SOLUTION INTRAVENOUS at 21:18

## 2024-06-09 RX ADMIN — HEPARIN SODIUM 5000 UNITS: 5000 INJECTION, SOLUTION INTRAVENOUS; SUBCUTANEOUS at 14:19

## 2024-06-09 RX ADMIN — FAMOTIDINE 20 MG: 20 TABLET, FILM COATED ORAL at 04:40

## 2024-06-09 RX ADMIN — HEPARIN SODIUM 5000 UNITS: 5000 INJECTION, SOLUTION INTRAVENOUS; SUBCUTANEOUS at 21:20

## 2024-06-09 RX ADMIN — METHOCARBAMOL TABLETS 500 MG: 500 TABLET, COATED ORAL at 21:20

## 2024-06-09 RX ADMIN — LOSARTAN POTASSIUM 100 MG: 50 TABLET, FILM COATED ORAL at 05:28

## 2024-06-09 RX ADMIN — AMOXICILLIN AND CLAVULANATE POTASSIUM 1 TABLET: 875; 125 TABLET, FILM COATED ORAL at 04:39

## 2024-06-09 ASSESSMENT — ENCOUNTER SYMPTOMS
NAUSEA: 0
SENSORY CHANGE: 0
SHORTNESS OF BREATH: 0
MYALGIAS: 1
NERVOUS/ANXIOUS: 0
FEVER: 0
DIARRHEA: 0
ABDOMINAL PAIN: 0
PALPITATIONS: 0

## 2024-06-09 ASSESSMENT — PAIN DESCRIPTION - PAIN TYPE
TYPE: ACUTE PAIN

## 2024-06-09 ASSESSMENT — FIBROSIS 4 INDEX: FIB4 SCORE: 1.08

## 2024-06-09 ASSESSMENT — LIFESTYLE VARIABLES: SUBSTANCE_ABUSE: 0

## 2024-06-09 NOTE — CARE PLAN
The patient is Stable - Low risk of patient condition declining or worsening    Shift Goals  Clinical Goals: IV abx and rest  Patient Goals: sleep with comfort  Family Goals: NA    Progress made toward(s) clinical / shift goals:  Patient received his Vancomycin iv followed with PO antibiotic. He slept through the shift with comfort.

## 2024-06-09 NOTE — PROGRESS NOTES
"Patient this am, he was sure not to receive his Lasix. \"My legs are not swelling as before, so I do not need it\".  Asked him about if he has or not CHF amd he said No.  "

## 2024-06-09 NOTE — PROGRESS NOTES
"                 VASCULAR SURGERY               Inpatient Progress Note  _________________________________    Vitals (6/8/2024)  /82   Pulse 71   Temp 36.3 °C (97.4 °F) (Temporal)   Resp 20   Ht 1.778 m (5' 10\")   Wt (!) 140 kg (309 lb 4.9 oz)   SpO2 98%   BMI 44.38 kg/m²   _________________________________    5/21/24 TEVAR and fem-fem for Type B dissection ([facility])    5/31/24 Removal of femfem, bilateral iliac stenting, right sartorius muscle flap, bilateral groin vacs ([facility])       6/8/2024  Feeling well, no specific complaints.  Wound vacs are intact.  Images show that the wounds are healing well without evidence of worsening infection.  Lower extremities are well-perfused.  Abdomen is benign.  Overall the patient is recovering well.  The groin wounds will need to heal entirely by secondary intention with wound VAC therapy which could take another few weeks.  Otherwise no additional imaging or intervention needed from vascular standpoint.    6/9/2024  Feeling well, no specific complaints.  Wound vacs are intact; anticipate wound VAC change tomorrow.    Lower extremities are well-perfused.  Abdomen is benign.  Overall the patient is recovering well.  The groin wounds will need to heal entirely by secondary intention with wound VAC therapy which could take another few weeks.  Otherwise no additional imaging or intervention needed from vascular standpoint.          Appreciate Hospitalist service's support  Vascular service will be following intermittently.  If any questions or concerns arise please notify the vascular surgeon on-call (Dr. Arnett will be covering Monday through Wednesday of this week).        Vishal Alcantara MD  [facility]  Voal[place] preferred or call my office [phone]  __________________________________________________________________  Patient:Rene Kelley   MRN:[id number]   CSN:[id number]    "

## 2024-06-10 LAB
ANION GAP SERPL CALC-SCNC: 12 MMOL/L (ref 7–16)
BASOPHILS # BLD AUTO: 1 % (ref 0–1.8)
BASOPHILS # BLD: 0.07 K/UL (ref 0–0.12)
BUN SERPL-MCNC: 16 MG/DL (ref 8–22)
CALCIUM SERPL-MCNC: 8.4 MG/DL (ref 8.5–10.5)
CHLORIDE SERPL-SCNC: 104 MMOL/L (ref 96–112)
CK SERPL-CCNC: 22 U/L (ref 0–154)
CO2 SERPL-SCNC: 21 MMOL/L (ref 20–33)
CREAT SERPL-MCNC: 1.08 MG/DL (ref 0.5–1.4)
EOSINOPHIL # BLD AUTO: 0.49 K/UL (ref 0–0.51)
EOSINOPHIL NFR BLD: 6.7 % (ref 0–6.9)
ERYTHROCYTE [DISTWIDTH] IN BLOOD BY AUTOMATED COUNT: 49.5 FL (ref 35.9–50)
FLUAV RNA SPEC QL NAA+PROBE: NEGATIVE
FLUBV RNA SPEC QL NAA+PROBE: NEGATIVE
GFR SERPLBLD CREATININE-BSD FMLA CKD-EPI: 82 ML/MIN/1.73 M 2
GLUCOSE SERPL-MCNC: 107 MG/DL (ref 65–99)
HCT VFR BLD AUTO: 37.7 % (ref 42–52)
HGB BLD-MCNC: 12.6 G/DL (ref 14–18)
IMM GRANULOCYTES # BLD AUTO: 0.05 K/UL (ref 0–0.11)
IMM GRANULOCYTES NFR BLD AUTO: 0.7 % (ref 0–0.9)
LYMPHOCYTES # BLD AUTO: 1.1 K/UL (ref 1–4.8)
LYMPHOCYTES NFR BLD: 15.1 % (ref 22–41)
MAGNESIUM SERPL-MCNC: 2 MG/DL (ref 1.5–2.5)
MCH RBC QN AUTO: 32.9 PG (ref 27–33)
MCHC RBC AUTO-ENTMCNC: 33.4 G/DL (ref 32.3–36.5)
MCV RBC AUTO: 98.4 FL (ref 81.4–97.8)
MONOCYTES # BLD AUTO: 0.95 K/UL (ref 0–0.85)
MONOCYTES NFR BLD AUTO: 13 % (ref 0–13.4)
NEUTROPHILS # BLD AUTO: 4.64 K/UL (ref 1.82–7.42)
NEUTROPHILS NFR BLD: 63.5 % (ref 44–72)
NRBC # BLD AUTO: 0 K/UL
NRBC BLD-RTO: 0 /100 WBC (ref 0–0.2)
PHOSPHATE SERPL-MCNC: 3.5 MG/DL (ref 2.5–4.5)
PLATELET # BLD AUTO: 257 K/UL (ref 164–446)
PMV BLD AUTO: 9.4 FL (ref 9–12.9)
POTASSIUM SERPL-SCNC: 4.3 MMOL/L (ref 3.6–5.5)
RBC # BLD AUTO: 3.83 M/UL (ref 4.7–6.1)
RSV RNA SPEC QL NAA+PROBE: NEGATIVE
SARS-COV-2 RNA RESP QL NAA+PROBE: NOTDETECTED
SODIUM SERPL-SCNC: 137 MMOL/L (ref 135–145)
SPECIMEN SOURCE: NORMAL
WBC # BLD AUTO: 7.3 K/UL (ref 4.8–10.8)

## 2024-06-10 PROCEDURE — 770006 HCHG ROOM/CARE - MED/SURG/GYN SEMI*

## 2024-06-10 PROCEDURE — 0241U HCHG SARS-COV-2 COVID-19 NFCT DS RESP RNA 4 TRGT MIC: CPT

## 2024-06-10 PROCEDURE — 700102 HCHG RX REV CODE 250 W/ 637 OVERRIDE(OP): Performed by: SURGERY

## 2024-06-10 PROCEDURE — A9270 NON-COVERED ITEM OR SERVICE: HCPCS | Performed by: HOSPITALIST

## 2024-06-10 PROCEDURE — 84100 ASSAY OF PHOSPHORUS: CPT

## 2024-06-10 PROCEDURE — 99233 SBSQ HOSP IP/OBS HIGH 50: CPT | Performed by: INTERNAL MEDICINE

## 2024-06-10 PROCEDURE — 700102 HCHG RX REV CODE 250 W/ 637 OVERRIDE(OP): Performed by: INTERNAL MEDICINE

## 2024-06-10 PROCEDURE — 80048 BASIC METABOLIC PNL TOTAL CA: CPT

## 2024-06-10 PROCEDURE — 97608 NEG PRS WND THER NDME>50SQCM: CPT

## 2024-06-10 PROCEDURE — 700102 HCHG RX REV CODE 250 W/ 637 OVERRIDE(OP): Performed by: HOSPITALIST

## 2024-06-10 PROCEDURE — 36415 COLL VENOUS BLD VENIPUNCTURE: CPT

## 2024-06-10 PROCEDURE — A9270 NON-COVERED ITEM OR SERVICE: HCPCS | Performed by: SURGERY

## 2024-06-10 PROCEDURE — 85025 COMPLETE CBC W/AUTO DIFF WBC: CPT

## 2024-06-10 PROCEDURE — 700111 HCHG RX REV CODE 636 W/ 250 OVERRIDE (IP): Performed by: INTERNAL MEDICINE

## 2024-06-10 PROCEDURE — A9270 NON-COVERED ITEM OR SERVICE: HCPCS | Performed by: INTERNAL MEDICINE

## 2024-06-10 PROCEDURE — 700111 HCHG RX REV CODE 636 W/ 250 OVERRIDE (IP): Performed by: HOSPITALIST

## 2024-06-10 PROCEDURE — 83735 ASSAY OF MAGNESIUM: CPT

## 2024-06-10 PROCEDURE — 700105 HCHG RX REV CODE 258: Performed by: INTERNAL MEDICINE

## 2024-06-10 PROCEDURE — 82550 ASSAY OF CK (CPK): CPT

## 2024-06-10 PROCEDURE — 700111 HCHG RX REV CODE 636 W/ 250 OVERRIDE (IP): Performed by: SURGERY

## 2024-06-10 RX ORDER — LIDOCAINE HYDROCHLORIDE 40 MG/ML
SOLUTION TOPICAL
Status: DISCONTINUED | OUTPATIENT
Start: 2024-06-10 | End: 2024-06-11 | Stop reason: HOSPADM

## 2024-06-10 RX ORDER — LIDOCAINE HYDROCHLORIDE 20 MG/ML
20 INJECTION, SOLUTION INFILTRATION; PERINEURAL
Status: DISCONTINUED | OUTPATIENT
Start: 2024-06-10 | End: 2024-06-11 | Stop reason: HOSPADM

## 2024-06-10 RX ORDER — NYSTATIN 100000 [USP'U]/G
POWDER TOPICAL PRN
Status: DISCONTINUED | OUTPATIENT
Start: 2024-06-10 | End: 2024-06-11 | Stop reason: HOSPADM

## 2024-06-10 RX ADMIN — CARVEDILOL 25 MG: 25 TABLET, FILM COATED ORAL at 08:21

## 2024-06-10 RX ADMIN — ONDANSETRON 4 MG: 4 TABLET, ORALLY DISINTEGRATING ORAL at 08:29

## 2024-06-10 RX ADMIN — AMLODIPINE BESYLATE 10 MG: 10 TABLET ORAL at 05:08

## 2024-06-10 RX ADMIN — GABAPENTIN 100 MG: 100 CAPSULE ORAL at 14:23

## 2024-06-10 RX ADMIN — FAMOTIDINE 20 MG: 20 TABLET, FILM COATED ORAL at 18:20

## 2024-06-10 RX ADMIN — AMOXICILLIN AND CLAVULANATE POTASSIUM 1 TABLET: 875; 125 TABLET, FILM COATED ORAL at 18:20

## 2024-06-10 RX ADMIN — AMOXICILLIN AND CLAVULANATE POTASSIUM 1 TABLET: 875; 125 TABLET, FILM COATED ORAL at 05:08

## 2024-06-10 RX ADMIN — METHOCARBAMOL TABLETS 500 MG: 500 TABLET, COATED ORAL at 08:21

## 2024-06-10 RX ADMIN — METHOCARBAMOL TABLETS 500 MG: 500 TABLET, COATED ORAL at 21:07

## 2024-06-10 RX ADMIN — HYDROMORPHONE HYDROCHLORIDE 1 MG: 1 INJECTION, SOLUTION INTRAMUSCULAR; INTRAVENOUS; SUBCUTANEOUS at 10:02

## 2024-06-10 RX ADMIN — LEVOTHYROXINE SODIUM 100 MCG: 0.1 TABLET ORAL at 05:08

## 2024-06-10 RX ADMIN — METHOCARBAMOL TABLETS 500 MG: 500 TABLET, COATED ORAL at 18:21

## 2024-06-10 RX ADMIN — VANCOMYCIN HYDROCHLORIDE 1000 MG: 5 INJECTION, POWDER, LYOPHILIZED, FOR SOLUTION INTRAVENOUS at 11:27

## 2024-06-10 RX ADMIN — GABAPENTIN 100 MG: 100 CAPSULE ORAL at 05:08

## 2024-06-10 RX ADMIN — FUROSEMIDE 40 MG: 40 TABLET ORAL at 05:08

## 2024-06-10 RX ADMIN — FLUTICASONE FUROATE, UMECLIDINIUM BROMIDE AND VILANTEROL TRIFENATATE 1 PUFF: 100; 62.5; 25 POWDER RESPIRATORY (INHALATION) at 05:10

## 2024-06-10 RX ADMIN — HEPARIN SODIUM 5000 UNITS: 5000 INJECTION, SOLUTION INTRAVENOUS; SUBCUTANEOUS at 14:23

## 2024-06-10 RX ADMIN — LOSARTAN POTASSIUM 100 MG: 50 TABLET, FILM COATED ORAL at 05:08

## 2024-06-10 RX ADMIN — GABAPENTIN 100 MG: 100 CAPSULE ORAL at 18:20

## 2024-06-10 RX ADMIN — HEPARIN SODIUM 5000 UNITS: 5000 INJECTION, SOLUTION INTRAVENOUS; SUBCUTANEOUS at 20:58

## 2024-06-10 RX ADMIN — OXYCODONE HYDROCHLORIDE 10 MG: 10 TABLET ORAL at 06:05

## 2024-06-10 RX ADMIN — HEPARIN SODIUM 5000 UNITS: 5000 INJECTION, SOLUTION INTRAVENOUS; SUBCUTANEOUS at 05:08

## 2024-06-10 RX ADMIN — OXYCODONE HYDROCHLORIDE 10 MG: 10 TABLET ORAL at 18:47

## 2024-06-10 RX ADMIN — CARVEDILOL 25 MG: 25 TABLET, FILM COATED ORAL at 18:20

## 2024-06-10 RX ADMIN — VANCOMYCIN HYDROCHLORIDE 1000 MG: 5 INJECTION, POWDER, LYOPHILIZED, FOR SOLUTION INTRAVENOUS at 21:02

## 2024-06-10 RX ADMIN — OXYCODONE HYDROCHLORIDE 10 MG: 10 TABLET ORAL at 09:12

## 2024-06-10 RX ADMIN — METHOCARBAMOL TABLETS 500 MG: 500 TABLET, COATED ORAL at 14:23

## 2024-06-10 RX ADMIN — FAMOTIDINE 20 MG: 20 TABLET, FILM COATED ORAL at 05:08

## 2024-06-10 ASSESSMENT — ENCOUNTER SYMPTOMS
PALPITATIONS: 0
DIARRHEA: 0
FEVER: 0
ABDOMINAL PAIN: 0
NERVOUS/ANXIOUS: 0
MYALGIAS: 1
NAUSEA: 0
SENSORY CHANGE: 0
SHORTNESS OF BREATH: 0

## 2024-06-10 ASSESSMENT — PAIN DESCRIPTION - PAIN TYPE
TYPE: ACUTE PAIN

## 2024-06-10 ASSESSMENT — LIFESTYLE VARIABLES: SUBSTANCE_ABUSE: 0

## 2024-06-10 NOTE — DISCHARGE PLANNING
Case Management Discharge Planning    Admission Date: 5/30/2024  GMLOS: 5.4  ALOS: 11    6-Clicks ADL Score: 21  6-Clicks Mobility Score: 20    Anticipated Discharge Dispo: Discharge Disposition: D/T to SNF with Medicare cert in anticipation of skilled care (03)    DME Needed: No    Action(s) Taken:   Per MD, pt is medically clear to discharge.      JAZZ RN attempted to follow up with Kevin with [facility] ([phone]) on acceptance and bed availability today. Left voicemail with direct contact information requesting call back.     1243  CM RN received call back from Meaghan stating pt's chart is still being reviewed for final determination, but she is anticipating to accept pt and confirmed a bed is available to receive pt tomorrow morning.   MD informed.     1530  CM RN spoke with Meaghan who confirmed pt has been accepted into the [facility] with bed available tomorrow 6/11. Meaghan requested transport be set for tomorrow with a  time of 0900. Meaghan requested new COVID test be completed as it has been more than 48 hours since last negative test.   MD and RN informed.     CM RN requested transport through ride line for tomorrow with  time of 0900 via [facility].     CM RN met with pt at bedside to discuss discharge plan. Pt verbalized he is agreeable to discharge to [facility] tomorrow and provided a physical signature on COBRA.     Escalations Completed: None    Medically Clear: Yes     Next Steps:  CM RN to follow up with medical team to discuss discharge barriers or needs.     Barriers to Discharge: Pending negative COVID test.     Is the patient up for discharge tomorrow: Yes    Is transport arranged for discharge disposition: Yes

## 2024-06-10 NOTE — DISCHARGE INSTRUCTIONS
HF Patient Discharge Instructions  Monitor your weight daily, and maintain a weight chart, to track your weight changes.   Activity as tolerated, unless your Doctor has ordered otherwise.    Follow a low fat, low cholesterol, low salt diet unless instructed otherwise by your Doctor. Read the labels on the back of food products and track your intake of fat, cholesterol and salt.   Fluid Restriction No. If a Fluid Restriction has been ordered by your Doctor, measure fluids with a measuring cup to ensure that you are not exceeding the restriction.   No smoking.  Oxygen No. If your Doctor has ordered that you wear Oxygen at home, it is important to wear it as ordered.  Did you receive an explanation from staff on the importance of taking each of your medications and why it is necessary to keep taking them unless your doctor says to stop? Yes  Were all of your questions answered about how to manage your heart failure and what to do if you have increased signs and symptoms after you go home? Yes  Do you feel like your heart failure care team involved you in the care treatment plan and allowed you to make decisions regarding your care while in the hospital and addressed any discharge needs you might have? Yes    See the educational handout provided at discharge for more information on monitoring your daily weight, activity and diet. This also explains more about Heart Failure, symptoms of a flare-up and some of the tests that you have undergone.     Warning Signs of a Flare-Up include:  Swelling in the ankles or lower legs.  Shortness of breath, while at rest, or while doing normal activities.   Shortness of breath at night when in bed, or coughing in bed.   Requiring more pillows to sleep at night, or needing to sit up at night to sleep.  Feeling weak, dizzy or fatigued.     When to call your Doctor:  Call your Primary Care Physician or Cardiologist if:   You experience any pain radiating to your jaw or neck.  You have  any difficulty breathing.  You experience weight gain of 3 lbs in a day or 5 lbs in a week.   You feel any palpitations or irregular heartbeats.  You become dizzy or lose consciousness.   If you have had an angiogram or had a pacemaker or AICD placed, and experience:  Bleeding, drainage or swelling at the surgical / puncture site.  Fever greater than 100.0 F  Shock from internal defibrillator.  Cool and / or numb extremities.     Please access the [facility] My HF Guide/Heart Failure Interactive Workbook:   [email or web address]

## 2024-06-10 NOTE — PROGRESS NOTES
Received report and assumed care of patient at change of shift. Patient is A&Ox4, on RA, and denies pain at this time. Wound vac in place to bilateral groin, 125 mmHg. Patient assessment completed, bed in lowest position, and call light and personal belongings are within reach. Patient expressed no further needs at this time.

## 2024-06-10 NOTE — PROGRESS NOTES
Hospital Medicine Daily Progress Note    Date of Service  6/10/2024    Chief Complaint  Groin pain and sob    Hospital Course  Rene Kelley is a 53 y.o. male with morbid obesity, HTN, smoker, CALOS, aortic dissection s/p aortic stent graft, right exernal iliac stent placement and left common iliac. He had a left to right femoral-femoral artery bypass graft.  He was admitted with low grade fever and leg/scrotal swelling admitted 5/30/2024 with a concern of surgical groin wound site drainage/infection.  Vascular surgeon and ID were consulted.     Status post removal of infected femoral-femoral bypass and placement of wound VAC on 5/31.  Or cultures grew Corynebacterium.  MRSA nares negative.  ID recommended Augmentin and vancomycin for 6 weeks.    Per ID note  --- Continue vancomycin and will stop Zosyn, recommend 6 weeks antibiotic course and will continue vancomycin - potentially will do 4 weeks of IV with either vancomycin or daptomycin (end 6/28/24) and then transition to po linezolid 600 mg bid for the final 2 weeks (end 7/12/24)     --- The patient is very deep bilateral groin wounds and has had recent extensive vascular surgery and stenting.  He is at risk for superinfection of the area which could possibly extend into the stenting - due to this will start Augmentin 875/125 twice daily as a preventative measure and continue for a 6-week course, end 7/12/24     Patient did have difficult to control hypertension requiring losartan in addition to his home amlodipine, and carvedilol.  Blood pressure regimen was actively being titrated during admission.  The patient also had some nausea, vomiting, heartburn which was controlled symptomatically.  PT/OT and recommended postacute placement for wound vacs and antibiotic course.    Interval Problem Update    Patient was seen and examined at bedside.  I have personally reviewed and interpreted vitals, labs, and imaging.    6/5.  Afebrile.  Mild hypertension.  On  room air.  Patient feels little groggy.  Just received pain meds for recent dressing changes.  Denies fevers, chills, chest pains, shortness of breath.  Has some intermittent pain around his groin and his back.  Discussed with social work.  Plan for placement to LTAC versus [facility] for antibiotics and wound VAC.  Counseled on smoking cessation  6/6.  Afebrile.  Hypertensive this morning.  On room air.  Patient had blood pressures in the 170s this morning.  Patient states never taking his blood pressure when he was actively nauseous and vomiting.  Denies fever, chills, chest pain, shortness of breath.  Started on losartan in addition to amlodipine and carvedilol.  PICC placed for long-term antibiotics.  Trying to place at [facility] for antibiotics and wound vacs but will need better control of blood pressure.  6/7.  Afebrile.  Hypertension is improved.  On room air.  Per vascular surgery goal systolic less than 140.  Denies fever, chills, chest pain, shortness of breath.  Pain is controlled currently the patient is anxious for wound care plan for today.  Blood pressure is better controlled.  Continue titrating with increasing losartan.  6/8.  Afebrile.  Patient does have intermittent hypertension.  He attributes these to sometimes they check his blood pressure after episodes of heartburn, nausea, vomiting.  This morning he blames the coffee he had which led to sudden onset of nausea and vomiting.  Mostly his blood pressures in the 140-150s.  Increase losartan to 100 mg.  Increased dose of famotidine.  As needed Tums.  Plan for placement to the [facility] on Monday.  6/9.  Afebrile.  Intermittent bradycardia.  Hypertension is improved.  On room air.  Denies fever, chills, chest pains, shortness of breath.  Nausea, vomiting after spicy foods.  Continue IV antibiotics and wound VAC.  Plan for placement.  6/10.  Afebrile.  Has been normotensive.  On room air.  Denies fever, chills, chest pain, shortness of breath.  Nausea,  "vomiting, heartburn is improved.  Tolerated wound VAC change today.  [facility] with [facility] set up for tomorrow.      I have discussed this patient's plan of care and discharge plan at IDT rounds today with Case Management, Nursing, Nursing leadership, and other members of the IDT team.    Consultants/Specialty  infectious disease and vascular surgery    Code Status  Full Code    Disposition  The patient is not medically cleared for discharge to home or a post-acute facility.  Anticipate discharge to: skilled nursing facility    I have placed the appropriate orders for post-discharge needs.    Review of Systems  Review of Systems   Constitutional:  Negative for fever and malaise/fatigue.   Respiratory:  Negative for shortness of breath.    Cardiovascular:  Negative for palpitations and leg swelling.   Gastrointestinal:  Negative for abdominal pain, diarrhea and nausea.   Genitourinary:  Negative for dysuria.   Musculoskeletal:  Positive for myalgias (bilateral groin sites but \"tolerable\").   Neurological:  Negative for sensory change.   Psychiatric/Behavioral:  Negative for substance abuse. The patient is not nervous/anxious.         Physical Exam  Temp:  [36.3 °C (97.4 °F)-36.9 °C (98.4 °F)] 36.5 °C (97.7 °F)  Pulse:  [69-71] 71  Resp:  [18-20] 18  BP: (120-146)/(64-85) 146/66  SpO2:  [95 %-98 %] 95 %    Physical Exam  Vitals reviewed.   Constitutional:       Appearance: Normal appearance. He is obese. He is not diaphoretic.   HENT:      Head: Normocephalic and atraumatic.      Nose: Nose normal.      Mouth/Throat:      Mouth: Mucous membranes are moist.      Pharynx: No oropharyngeal exudate.   Eyes:      General: No scleral icterus.        Right eye: No discharge.         Left eye: No discharge.      Extraocular Movements: Extraocular movements intact.      Conjunctiva/sclera: Conjunctivae normal.   Cardiovascular:      Rate and Rhythm: Normal rate and regular rhythm.      " Pulses:           Radial pulses are 2+ on the right side and 2+ on the left side.        Dorsalis pedis pulses are 2+ on the right side and 2+ on the left side.      Heart sounds: No murmur heard.  Pulmonary:      Effort: Pulmonary effort is normal. No respiratory distress.      Breath sounds: Normal breath sounds. No wheezing or rales.   Abdominal:      General: Bowel sounds are normal. There is no distension.      Palpations: Abdomen is soft.      Tenderness: There is no abdominal tenderness.   Musculoskeletal:         General: No swelling.      Cervical back: Neck supple. No muscular tenderness.      Right lower leg: No edema.      Left lower leg: No edema.   Lymphadenopathy:      Cervical: No cervical adenopathy.   Skin:     Coloration: Skin is not jaundiced or pale.      Comments: Bilateral groin with wound VAC   Neurological:      General: No focal deficit present.      Mental Status: He is alert and oriented to person, place, and time. Mental status is at baseline.      Cranial Nerves: No cranial nerve deficit.   Psychiatric:         Mood and Affect: Mood normal.         Behavior: Behavior normal.         Fluids  No intake or output data in the 24 hours ending 06/10/24 0526      Laboratory  Recent Labs     06/08/24  0150 06/09/24  1030 06/10/24  0038   WBC 7.2 7.9 7.3   RBC 3.78* 3.85* 3.83*   HEMOGLOBIN 12.3* 12.7* 12.6*   HEMATOCRIT 37.4* 37.4* 37.7*   MCV 98.9* 97.1 98.4*   MCH 32.5 33.0 32.9   MCHC 32.9 34.0 33.4   RDW 49.7 49.0 49.5   PLATELETCT 273 274 257   MPV 9.5 9.7 9.4     Recent Labs     06/08/24  0150 06/09/24  1030 06/10/24  0038   SODIUM 135 134* 137   POTASSIUM 4.2 4.1 4.3   CHLORIDE 102 102 104   CO2 25 23 21   GLUCOSE 111* 123* 107*   BUN 19 18 16   CREATININE 1.11 1.03 1.08   CALCIUM 8.6 8.6 8.4*                   Imaging  IR-PICC LINE PLACEMENT W/ GUIDANCE > AGE 5   Final Result                  Ultrasound-guided PICC placement performed by qualified nursing staff as    above.           EC-ECHOCARDIOGRAM COMPLETE W/ CONT   Final Result      EC-ECHOCARDIOGRAM COMPLETE W/O CONT         DX-CHEST-PORTABLE (1 VIEW)   Final Result      1.  Trace LEFT pleural effusion   2.  Decreased LEFT peripheral pulmonary opacity which could be atelectasis or pneumonia      CT-CTA COMPLETE THORACOABDOMINAL AORTA   Final Result      1.  Type B aortic dissection status post thoracic stent graft. Dissection flap is unchanged extending from just beyond the left subclavian artery origin into the bilateral external iliac arteries.   2.  Proximal descending thoracic aortic aneurysm measuring 4.5 x 4.5 cm.   3.  Status post bilateral common iliac artery stent placement and femorofemoral bypass. Stents and distal femoral arteries appear patent.   4.  Mild stranding and multiple foci of gas in the lower anterior abdominal wall without focal drainable fluid collection just chest abscess.   5.  Redemonstrated extension of the abdominal aortic dissection into the celiac artery which supplied by both the true and false lumen. Distal vessels appear patent.   6.  Diffuse bilateral tree-in-bud opacities which could be seen in setting edema and/or infection.   7.  15 mm lingular pulmonary nodule.   8.  Trace left pleural effusion.   9.  Nonobstructive bilateral renal calculi.      [place] Type B aortic dissection      Fleischner Society pulmonary nodule recommendations:   Low and High Risk: Consider CT at 3 months, PET/CT, or tissue sampling.      Low Risk - Minimal or absent history of smoking and of other known risk factors.      High Risk - History of smoking or of other known risk factors.      Note: These recommendations do not apply to lung cancer screening, patients with immunosuppression, or patients with known primary cancer.      Fleischner Society 2017 Guidelines for Management of Incidentally Detected Pulmonary Nodules in Adults               IR-ABDOMINAL AORTA-WITH RUNOFF    (Results Pending)        Assessment/Plan  *  Wound infection- (present on admission)  Assessment & Plan  6/10/2024  Pustular drainage from his groin wound  Staples removed in the ER  Pain control  Vancomycin and augmentin  Follow-up on wound cultures and blood cultures  Vascular surgery following and took patient to OR  Infectious disease consulting     --- Continue vancomycin and will stop Zosyn, recommend 6 weeks antibiotic course and will continue vancomycin - potentially will do 4 weeks of IV with either vancomycin or daptomycin (end 6/28/24) and then transition to po linezolid 600 mg bid for the final 2 weeks (end 7/12/24)     --- The patient is very deep bilateral groin wounds and has had recent extensive vascular surgery and stenting.  He is at risk for superinfection of the area which could possibly extend into the stenting - due to this will start Augmentin 875/125 twice daily as a preventative measure and continue for a 6-week course, end 7/12/24     Acute diastolic heart failure (HCC)- (present on admission)  Assessment & Plan  6/10/2024  Presents decompensated  Near euvolemic.  Switch to oral Lasix.  Strict ins and outs and daily weights    Acute respiratory failure with hypoxia (HCC)- (present on admission)  Assessment & Plan  6/10/2024  On room air  Nocturnal bipap    Tobacco abuse- (present on admission)  Assessment & Plan  Spent approx 5 mins on Tobacco cessation education . Discussed options of nicotine patch, medical treatment with wellbutrin and chantix. Discussed the benefits of quitting smoking and risks of continued smoking including cardiovascular disease, cancer and COPD.   Code 65854  -I have ordered nicotine replacement therapy    Acquired hypothyroidism- (present on admission)  Assessment & Plan  6/10/2024  Continue levothyroxine  5/25/24 TSH:7.4 FT4:0.83  Synthroid 100mcg daily    Morbid obesity due to excess calories (HCC)- (present on admission)  Assessment & Plan  Body mass index is 44.38 kg/m².  Encourage future lifestyle  modifications.    Dissection of aorta (HCC)- (present on admission)  Assessment & Plan  6/10/2024  status post emergent endovascular repair of descending thoracic aortic type B dissection with placement of thoracic stent graft  Patient also underwent iliac stenting and femoral to femoral artery bypass  Pain control  Monitor blood pressure  He has multiple stents and graft placement.    Not on asa at this time.  Control lipids     Hypertension- (present on admission)  Assessment & Plan  6/10/2024  Continue home blood pressure medications  Amlodipine, coreg, lasix  Wean lasix based on renal function and vitals.         VTE prophylaxis: Heparin    I have performed a physical exam and reviewed and updated ROS and Plan today (6/10/2024). In review of yesterday's note (6/9/2024), there are no changes except as documented above.      Greater than 51 minutes spent prepping to see patient (e.g. review of tests) obtaining and/or reviewing separately obtained history. Performing a medically appropriate examination and/ evaluation.  Counseling and educating the patient/family/caregiver.  Ordering medications, tests, or procedures.  Referring and communicating with other health care professionals.  Documenting clinical information in EPIC.  Independently interpreting results and communicating results to patient/family/caregiver.  Care coordination.

## 2024-06-10 NOTE — WOUND TEAM
[facility]  Inpatient Services  Wound and Skin Care Follow-up    Admission Date: 5/30/2024     Last order of IP CONSULT TO WOUND CARE was found on 6/5/2024 from Hospital Encounter on 5/30/2024     HPI, PMH, SH: Reviewed    Past Surgical History:   Procedure Laterality Date    FEMORAL FEMORAL BYPASS N/A 5/31/2024    Procedure: REMOVAL OF FEMORAL-FEMORAL BYPASS GRAFT;  Surgeon: Vishal Alcantara M.D.;  Location: SURGERY [facility];  Service: Vascular    ANGIOGRAM N/A 5/31/2024    Procedure: ANGIOGRAM WITH STENT PLACEMENT;  Surgeon: Vishal Alcantara M.D.;  Location: SURGERY [facility];  Service: Vascular    FLAP GRAFT Right 5/31/2024    Procedure: RIGHT SARTORIUS MUSCLE GRAFT;  Surgeon: Vishal Alcantara M.D.;  Location: SURGERY [facility];  Service: Vascular    APPLICATION OR REPLACEMENT, WOUND VAC Bilateral 5/31/2024    Procedure: APPLICATION OR REPLACEMENT, WOUND VAC;  Surgeon: Vishal Alcantara M.D.;  Location: SURGERY [facility];  Service: Vascular    ABDOMINAL AORTIC ANEURYSM Bilateral 5/21/2024    Procedure: AORTIC DISSECTION REPAIR;  Surgeon: Gagandeep Arnett M.D.;  Location: SURGERY [facility];  Service: General    ECHOCARDIOGRAM, TRANSESOPHAGEAL, INTRAOPERATIVE N/A 5/21/2024    Procedure: ECHOCARDIOGRAM, TRANSESOPHAGEAL, INTRAOPERATIVE;  Surgeon: Gagandeep Arnett M.D.;  Location: SURGERY [facility];  Service: General    FEMORAL FEMORAL BYPASS Bilateral 5/21/2024    Procedure: CREATION, BYPASS, ARTERIAL, FEMORAL TO FEMORAL, USING GRAFT;  Surgeon: Gagandeep Arnett M.D.;  Location: SURGERY [facility];  Service: General     Social History     Tobacco Use    Smoking status: Every Day     Current packs/day: 5.00     Average packs/day: 5.0 packs/day for 0.1 years (0.3 ttl pk-yrs)     Types: Cigarettes     Start date: 5/20/2024    Smokeless tobacco: Former    Tobacco comments:     Pt reports 1-2 cigs per day.    Substance Use Topics    Alcohol use: Yes     Comment: [facility] yash     Chief  Complaint   Patient presents with    Chest Pain     Initial complaint.  Currently 0/10.     Shortness of Breath    Dizziness    Groin Pain     With testicular edema.    Wound Infection     Possible, at surgical site supra pubic region.       Diagnosis: Wound infection [T14.8XXA, L08.9]    Unit where seen by Wound Team: S532/02     WOUND FOLLOW UP RELATED TO:  Bilateral groin NPWT       WOUND TEAM PLAN OF CARE - Frequency of Follow-up:   Nursing to follow dressing orders written for wound care. Contact wound team if area fails to progress, deteriorates or with any questions/concerns if something comes up before next scheduled follow up (See below as to whether wound is following and frequency of wound follow up)  Dressing changes by wound team:                   NPWT change 3 times weekly -      WOUND HISTORY:       Patient had an aortic dissection and surgeons were able to repair.  Bilateral angioplasty and stent placement of common iliac.  Patient discharged home, and returned with groin site infections.  Patient taken to surgery with Dr. Alcantara on 5/31/24 and placed wound vac.      6/6/24 Dr Ahmadi recommending regular VAC for B groin wounds           WOUND ASSESSMENT/LDA  Negative Pressure Wound Therapy 05/31/24  Groin Bilateral (Active)   Placement Date/Time: 05/31/24 (c) 1000   Inserted by: OR  Wound Type: (c)   Location: Groin  Laterality: Bilateral      Assessments 6/10/2024 12:00 PM   NPWT Pump Mode / Pressure Setting 125 mmHg   Dressing Type Medium;Black Foam (Regular)   Number of Foam Pieces Used 4   Canister Changed No   NEXT Dressing Change/Treatment Date 06/12/24   VAC VeraFlo Irrigant Other (Comments)   VAC VeraFlo Soak Time (mins) 0   VAC VeraFlo Instill Volume (ml) 0   VAC VeraFlo - Therapy Time (hrs) 0   VAC VeraFlo Pressure (mm/Hg) 125 mmHg       Wound 06/03/24 Full Thickness Wound Groin Right (Active)   Date First Assessed/Time First Assessed: 06/03/24 1232   Present on Original Admission: No   Hand Hygiene Completed: Yes  Primary Wound Type: Full Thickness Wound  Location: Groin  Laterality: Right      Assessments 6/10/2024 12:00 PM   Wound Image      Site Assessment Red;Yellow   Periwound Assessment Rash;Induration;Intact   Margins Defined edges;Unattached edges   Closure Secondary intention   Drainage Amount Small   Drainage Description Serosanguineous   Treatments Cleansed;Site care;Nonselective debridement   Wound Cleansing Approved Wound Cleanser   Periwound Protectant No-sting Skin Prep;Drape   Dressing Status Clean;Dry;Intact   Dressing Changed Changed   Dressing Cleansing/Solutions Not Applicable   Dressing Options Wound Vac   Dressing Change/Treatment Frequency Monday, Wednesday, Friday, and As Needed   NEXT Dressing Change/Treatment Date 06/12/24   NEXT Weekly Photo (Inpatient Only) 06/17/24   Wound Team Following 3x Weekly   Non-staged Wound Description Full thickness   Wound Length (cm) 2 cm   Wound Width (cm) 12 cm   Wound Depth (cm) 2 cm   Wound Surface Area (cm^2) 24 cm^2   Wound Volume (cm^3) 48 cm^3   Wound Healing % 68   Wound Bed Granulation (%) 70 %   Wound Bed Slough (%) 30 %   Shape oval   Wound Odor None   WOUND NURSE ONLY - Time Spent with Patient (mins) 75       Wound 06/03/24 Full Thickness Wound Groin Left (Active)   Date First Assessed/Time First Assessed: 06/03/24 1233   Present on Original Admission: No  Hand Hygiene Completed: Yes  Primary Wound Type: Full Thickness Wound  Location: Groin  Laterality: Left      Assessments 6/10/2024 12:00 PM   Wound Image      Site Assessment Red;Yellow   Periwound Assessment Rash;Induration;Red   Margins Defined edges;Unattached edges   Closure Secondary intention   Drainage Amount Small   Drainage Description Serosanguineous   Treatments Cleansed;Nonselective debridement;Site care   Wound Cleansing Approved Wound Cleanser   Periwound Protectant No-sting Skin Prep;Drape   Dressing Status Clean;Dry;Intact   Dressing Changed Changed    Dressing Cleansing/Solutions Not Applicable   Dressing Options Wound Vac   Dressing Change/Treatment Frequency Monday, Wednesday, Friday, and As Needed   NEXT Dressing Change/Treatment Date 06/12/24   NEXT Weekly Photo (Inpatient Only) 06/17/24   Wound Team Following 3x Weekly   Non-staged Wound Description Full thickness   Wound Length (cm) 2 cm   Wound Width (cm) 10 cm   Wound Depth (cm) 3 cm   Wound Surface Area (cm^2) 20 cm^2   Wound Volume (cm^3) 60 cm^3   Wound Healing % 61   Tunneling (cm) 3 cm   Tunneling Clock Position of Wound 3 o'clock   Shape oval   Wound Odor None        Vascular:    NIRAJ:   No results found.    Lab Values:    Lab Results   Component Value Date/Time    WBC 7.3 06/10/2024 12:38 AM    RBC 3.83 (L) 06/10/2024 12:38 AM    HEMOGLOBIN 12.6 (L) 06/10/2024 12:38 AM    HEMATOCRIT 37.7 (L) 06/10/2024 12:38 AM    SEDRATEWES 16 05/31/2024 12:27 AM    HBA1C 5.0 05/30/2024 01:05 PM         Culture Results show:  Recent Results (from the past 720 hour(s))   CULTURE WOUND W/ GRAM STAIN    Collection Time: 05/30/24  4:35 PM    Specimen: Exudate; Wound   Result Value Ref Range    Significant Indicator POS (POS)     Source WND     Site LLQ Abdomen     Culture Result Light growth usual skin eliza. (A)     Gram Stain Result Many WBCs.  No organisms seen.       Culture Result (A)      Corynebacterium amycolatum  Moderate growth  Susceptibilities in progress         Pain Level/Medicated:  2% Lidocaine jelly allowed to dwell on wound bed 10 minutes prior, PO pain medications administered by bedside RN 1 hour prior, and IV pain medications administered by bedside RN 10 minutes prior (Pt educated that IV medication will not be available on outpatient basis)       INTERVENTIONS BY WOUND TEAM:  Chart and images reviewed. Discussed with bedside RN. All areas of concern (based on picture review, LDA review and discussion with bedside RN) have been thoroughly assessed. Documentation of areas based on significant  findings. This RN in to assess patient. Performed standard wound care which includes appropriate positioning, dressing removal and non-selective debridement. Pictures and measurements obtained weekly if/when required.    Wound:  Bilateral groins  Cleansed/Non-selectively Debrided with:  Wound cleanser and Gauze  Taylor wound: Cleansed with Wound cleanser and Gauze, Prepped with No Sting and Drape  Primary Dressing:  Black regular foam tucked into wound beds and secured with drape.    Secondary (Outer) Dressing: a window was cut and a button and tac pad applied to each wound. Seal intact.      Advanced Wound Care Discharge Planning  Number of Clinicians necessary to complete wound care: 1  Is patient requiring IV pain medications for dressing changes:  Yes  Length of time for dressing change 60 min. (This does not include chart review, pre-medication time, set up, clean up or time spent charting.)    Interdisciplinary consultation: Patient, Bedside RN (Miguelina), N/A.  Pressure injury and staging reviewed with N/A.    EVALUATION / RATIONALE FOR TREATMENT:     Date:  06/10/24  Wound Status:  Wound progressing as expected    Wound beds are decreasing in size, and less slough present than a week ago.  Added Lidocaine to assist with patient's discomfort during dressing changes.  Continue NPWT.  Patient's taylor-wound appears to have yeast like rash.  New orders placed for PRN vac changes Nystatin crusting.    Date:  06/07/24  Wound Status:      Pt's B wounds with some slough, desicated adipose and some granular tissue.  NPWT will assist with granular tissue formation.      Date:  06/05/24  Wound Status:  Wound progressing as expected    R. Groin with adherent slough, L groin improving with more red tissue.  No odor present.  Changed to normal saline per Dr. Ahmadi and then change patient to regular wound vac Friday.    Date:  06/03/24  Wound Status:  Initial evaluation    Bilateral groins are full thickness, adipose tissue  present.   VF NPWT applied to assist with mechanical debridement, cleansing the wound bed, increase oxygenation and granulation to the area and healing the wound by secondary intention.       Bilateral heels: intact offloaded with adhesive foam  Sacrum: intact            Goals: Steady decrease in wound area and depth weekly.    NURSING PLAN OF CARE ORDERS:  Dressing changes: See Dressing Care orders  Skin care: See Skin Care orders  RN Prevention Protocol    NUTRITION RECOMMENDATIONS   Wound Team Recommendations:  Dietary consult     DIET ORDERS (From admission to next 24h)       Start     Ordered    06/07/24 0354  Diet Early Tray  ONCE        Question:  Early Tray Type  Answer:  1 Time Only    06/07/24 0353    05/31/24 1623  Diet Order Diet: 2 Gram Sodium  ALL MEALS        Question:  Diet:  Answer:  2 Gram Sodium    05/31/24 1622                    PREVENTATIVE INTERVENTIONS:   Q shift Tano - performed per nursing policy  Q shift pressure point assessments - performed per nursing policy    Surface/Positioning  Bariatric JULIET - Currently in Place  Reposition q 2 hours - Currently in Place  TAPs Turning system - Currently in Place    Offloading/Redistribution  N/A      Mobilization      Ambulate      [facility]         Vac Discharge Needs:  Vac Discharge plan is purely a recommendation from wound team and not a requirement for discharge unless otherwise stated by physician.  Regular Vac in use and continued at discharge

## 2024-06-10 NOTE — PROGRESS NOTES
Bedside report received from Ashok COTA at this time. Patient A+Ox4, sitting up at table in common area, wound vac in place and patent with no alarms or leaks noted. On room air, VSS. Reports good effect from oxycodone pain medication. Makes needs known, able to walk around unit independently with steady gait

## 2024-06-10 NOTE — PROGRESS NOTES
Hospital Medicine Daily Progress Note    Date of Service  6/9/2024    Chief Complaint  Groin pain and sob    Hospital Course  Rene Kelley is a 53 y.o. male with morbid obesity, HTN, smoker, CALOS, aortic dissection s/p aortic stent graft, right exernal iliac stent placement and left common iliac. He had a left to right femoral-femoral artery bypass graft.  He was admitted with low grade fever and leg/scrotal swelling admitted 5/30/2024 with a concern of surgical groin wound site drainage/infection.  Vascular surgeon and ID were consulted.     Status post removal of infected femoral-femoral bypass and placement of wound VAC on 5/31.  Or cultures grew Corynebacterium.  MRSA nares negative.  ID recommended Augmentin and vancomycin for 6 weeks.    Patient did have difficult to control hypertension requiring losartan in addition to his home amlodipine, and carvedilol.  Blood pressure regimen was actively being titrated during admission.    Interval Problem Update    Patient was seen and examined at bedside.  I have personally reviewed and interpreted vitals, labs, and imaging.    6/5.  Afebrile.  Mild hypertension.  On room air.  Patient feels little groggy.  Just received pain meds for recent dressing changes.  Denies fevers, chills, chest pains, shortness of breath.  Has some intermittent pain around his groin and his back.  Discussed with social work.  [facility] and wound VAC.  Counseled on smoking cessation  6/6.  Afebrile.  Hypertensive this morning.  On room air.  Patient had blood pressures in the 170s this morning.  Patient states never taking his blood pressure when he was actively nauseous and vomiting.  Denies fever, chills, chest pain, shortness of breath.  Started on losartan in addition to amlodipine and carvedilol.  PICC placed for long-term antibiotics.  [facility] and wound vacs but will need better control of blood  "pressure.  6/7.  Afebrile.  Hypertension is improved.  On room air.  Per vascular surgery goal systolic less than 140.  Denies fever, chills, chest pain, shortness of breath.  Pain is controlled currently the patient is anxious for wound care plan for today.  Blood pressure is better controlled.  Continue titrating with increasing losartan.  6/8.  Afebrile.  Patient does have intermittent hypertension.  He attributes these to sometimes they check his blood pressure after episodes of heartburn, nausea, vomiting.  This morning he blames the coffee he had which led to sudden onset of nausea and vomiting.  Mostly his blood pressures in the 140-150s.  Increase losartan to 100 mg.  Increased dose of famotidine.  As needed Tums.  Plan for placement to the [facility] on Monday.  6/9.  Afebrile.  Intermittent bradycardia.  Hypertension is improved.  On room air.  Denies fever, chills, chest pains, shortness of breath.  Nausea, vomiting after spicy foods.  Continue IV antibiotics and wound VAC.  Plan for placement.    I have discussed this patient's plan of care and discharge plan at IDT rounds today with Case Management, Nursing, Nursing leadership, and other members of the IDT team.    Consultants/Specialty  infectious disease and vascular surgery    Code Status  Full Code    Disposition  The patient is not medically cleared for discharge to home or a post-acute facility.  Anticipate discharge to: skilled nursing facility    I have placed the appropriate orders for post-discharge needs.    Review of Systems  Review of Systems   Constitutional:  Negative for fever and malaise/fatigue.   Respiratory:  Negative for shortness of breath.    Cardiovascular:  Negative for palpitations and leg swelling.   Gastrointestinal:  Negative for abdominal pain, diarrhea and nausea.   Genitourinary:  Negative for dysuria.   Musculoskeletal:  Positive for myalgias (bilateral groin sites but \"tolerable\").   Neurological:  Negative for sensory " change.   Psychiatric/Behavioral:  Negative for substance abuse. The patient is not nervous/anxious.         Physical Exam  Temp:  [36.3 °C (97.4 °F)-37 °C (98.6 °F)] 36.6 °C (97.9 °F)  Pulse:  [58-71] 69  Resp:  [18-20] 20  BP: (120-156)/(67-85) 132/85  SpO2:  [94 %-98 %] 96 %    Physical Exam  Vitals reviewed.   Constitutional:       Appearance: Normal appearance. He is obese. He is not diaphoretic.   HENT:      Head: Normocephalic and atraumatic.      Nose: Nose normal.      Mouth/Throat:      Mouth: Mucous membranes are moist.      Pharynx: No oropharyngeal exudate.   Eyes:      General: No scleral icterus.        Right eye: No discharge.         Left eye: No discharge.      Extraocular Movements: Extraocular movements intact.      Conjunctiva/sclera: Conjunctivae normal.   Cardiovascular:      Rate and Rhythm: Normal rate and regular rhythm.      Pulses:           Radial pulses are 2+ on the right side and 2+ on the left side.        Dorsalis pedis pulses are 2+ on the right side and 2+ on the left side.      Heart sounds: No murmur heard.  Pulmonary:      Effort: Pulmonary effort is normal. No respiratory distress.      Breath sounds: Normal breath sounds. No wheezing or rales.   Abdominal:      General: Bowel sounds are normal. There is no distension.      Palpations: Abdomen is soft.      Tenderness: There is no abdominal tenderness.   Musculoskeletal:         General: No swelling.      Cervical back: Neck supple. No muscular tenderness.      Right lower leg: No edema.      Left lower leg: No edema.   Lymphadenopathy:      Cervical: No cervical adenopathy.   Skin:     Coloration: Skin is not jaundiced or pale.      Comments: Bilateral groin with wound VAC   Neurological:      General: No focal deficit present.      Mental Status: He is alert and oriented to person, place, and time. Mental status is at baseline.      Cranial Nerves: No cranial nerve deficit.   Psychiatric:         Mood and Affect: Mood  normal.         Behavior: Behavior normal.         Fluids    Intake/Output Summary (Last 24 hours) at 6/9/2024 1850  Last data filed at 6/9/2024 0400  Gross per 24 hour   Intake --   Output 1150 ml   Net -1150 ml       Laboratory  Recent Labs     06/07/24  0630 06/08/24  0150 06/09/24  1030   WBC 7.1 7.2 7.9   RBC 3.86* 3.78* 3.85*   HEMOGLOBIN 12.6* 12.3* 12.7*   HEMATOCRIT 37.5* 37.4* 37.4*   MCV 97.2 98.9* 97.1   MCH 32.6 32.5 33.0   MCHC 33.6 32.9 34.0   RDW 49.1 49.7 49.0   PLATELETCT 290 273 274   MPV 9.5 9.5 9.7     Recent Labs     06/07/24  0630 06/08/24  0150 06/09/24  1030   SODIUM 137 135 134*   POTASSIUM 4.2 4.2 4.1   CHLORIDE 103 102 102   CO2 24 25 23   GLUCOSE 100* 111* 123*   BUN 19 19 18   CREATININE 1.08 1.11 1.03   CALCIUM 8.8 8.6 8.6                   Imaging  IR-PICC LINE PLACEMENT W/ GUIDANCE > AGE 5   Final Result                  Ultrasound-guided PICC placement performed by qualified nursing staff as    above.          EC-ECHOCARDIOGRAM COMPLETE W/ CONT   Final Result      EC-ECHOCARDIOGRAM COMPLETE W/O CONT         DX-CHEST-PORTABLE (1 VIEW)   Final Result      1.  Trace LEFT pleural effusion   2.  Decreased LEFT peripheral pulmonary opacity which could be atelectasis or pneumonia      CT-CTA COMPLETE THORACOABDOMINAL AORTA   Final Result      1.  Type B aortic dissection status post thoracic stent graft. Dissection flap is unchanged extending from just beyond the left subclavian artery origin into the bilateral external iliac arteries.   2.  Proximal descending thoracic aortic aneurysm measuring 4.5 x 4.5 cm.   3.  Status post bilateral common iliac artery stent placement and femorofemoral bypass. Stents and distal femoral arteries appear patent.   4.  Mild stranding and multiple foci of gas in the lower anterior abdominal wall without focal drainable fluid collection just chest abscess.   5.  Redemonstrated extension of the abdominal aortic dissection into the celiac artery which supplied  by both the true and false lumen. Distal vessels appear patent.   6.  Diffuse bilateral tree-in-bud opacities which could be seen in setting edema and/or infection.   7.  15 mm lingular pulmonary nodule.   8.  Trace left pleural effusion.   9.  Nonobstructive bilateral renal calculi.      [place] Type B aortic dissection      Fleischner Society pulmonary nodule recommendations:   Low and High Risk: Consider CT at 3 months, PET/CT, or tissue sampling.      Low Risk - Minimal or absent history of smoking and of other known risk factors.      High Risk - History of smoking or of other known risk factors.      Note: These recommendations do not apply to lung cancer screening, patients with immunosuppression, or patients with known primary cancer.      Fleischner Society 2017 Guidelines for Management of Incidentally Detected Pulmonary Nodules in Adults               IR-ABDOMINAL AORTA-WITH RUNOFF    (Results Pending)        Assessment/Plan  * Wound infection- (present on admission)  Assessment & Plan  6/9/2024  Pustular drainage from his groin wound  Staples removed in the ER  Pain control  Vancomycin and augmentin  Follow-up on wound cultures and blood cultures  Vascular surgery following and took patient to OR  Infectious disease consulting     --- Continue vancomycin and will stop Zosyn, recommend 6 weeks antibiotic course and will continue vancomycin - potentially will do 4 weeks of IV with either vancomycin or daptomycin (end 6/28/24) and then transition to po linezolid 600 mg bid for the final 2 weeks (end 7/12/24)     --- The patient is very deep bilateral groin wounds and has had recent extensive vascular surgery and stenting.  He is at risk for superinfection of the area which could possibly extend into the stenting - due to this will start Augmentin 875/125 twice daily as a preventative measure and continue for a 6-week course, end 7/12/24     Acute diastolic heart failure (HCC)  Assessment &  Plan  6/9/2024  Presents decompensated  IV Lasix 40 3x daily  As of 6/4 net negative 14/9 liters since admit.  Strict ins and outs and daily weights    Acute respiratory failure with hypoxia (HCC)  Assessment & Plan  6/9/2024  On room air  Nocturnal bipap    Tobacco abuse- (present on admission)  Assessment & Plan  Spent approx 5 mins on Tobacco cessation education . Discussed options of nicotine patch, medical treatment with wellbutrin and chantix. Discussed the benefits of quitting smoking and risks of continued smoking including cardiovascular disease, cancer and COPD.   Code 80499  -I have ordered nicotine replacement therapy    Acquired hypothyroidism- (present on admission)  Assessment & Plan  6/9/2024  Continue levothyroxine  5/25/24 TSH:7.4 FT4:0.83  Synthroid 100mcg daily    Morbid obesity due to excess calories (HCC)- (present on admission)  Assessment & Plan  Body mass index is 44.38 kg/m².  Encourage future lifestyle modifications.    Dissection of aorta (HCC)- (present on admission)  Assessment & Plan  6/9/2024  status post emergent endovascular repair of descending thoracic aortic type B dissection with placement of thoracic stent graft  Patient also underwent iliac stenting and femoral to femoral artery bypass  Pain control  Monitor blood pressure  He has multiple stents and graft placement.    Not on asa at this time.  Control lipids     Hypertension- (present on admission)  Assessment & Plan  6/9/2024  Continue home blood pressure medications  Amlodipine, coreg, lasix  Wean lasix based on renal function and vitals.         VTE prophylaxis: Heparin    I have performed a physical exam and reviewed and updated ROS and Plan today (6/9/2024). In review of yesterday's note (6/8/2024), there are no changes except as documented above.      Greater than 51 minutes spent prepping to see patient (e.g. review of tests) obtaining and/or reviewing separately obtained history. Performing a medically appropriate  examination and/ evaluation.  Counseling and educating the patient/family/caregiver.  Ordering medications, tests, or procedures.  Referring and communicating with other health care professionals.  Documenting clinical information in EPIC.  Independently interpreting results and communicating results to patient/family/caregiver.  Care coordination.

## 2024-06-10 NOTE — CARE PLAN
The patient is Stable - Low risk of patient condition declining or worsening    Shift Goals  Clinical Goals: Patient will receive IV antibiotics as ordered. Patient will have wound vac in place.  Patient Goals: Patient will be able to sleep comfortably throughout the night.    Progress made toward(s) clinical / shift goals:  Patient received IV antibiotics as ordered to treat infection. Patient had wound vac in place to bilateral groin sites. Patient was up independently and remained free from falls.    Patient is not progressing towards the following goals:

## 2024-06-10 NOTE — DISCHARGE PLANNING
DC Transport Scheduled     Transport Company Scheduled:  [facility]  Spoke with Debra at [facility] to schedule transport.     Scheduled Date: 6/11/2024  Scheduled Time: 0900     Destination: [facility]           Destination address:   [address] [place] [id number]     Notified care team of scheduled transport via [employer].      If there are any changes needed to the DC transportation scheduled, please contact [facility] at ext. [phone] between the hours of 3031-8750 Mon-Fri. If outside those hours, contact the ED Case Manager at ext. [phone].

## 2024-06-11 VITALS
TEMPERATURE: 98.2 F | DIASTOLIC BLOOD PRESSURE: 63 MMHG | HEIGHT: 70 IN | RESPIRATION RATE: 17 BRPM | WEIGHT: 309.31 LBS | HEART RATE: 64 BPM | BODY MASS INDEX: 44.28 KG/M2 | SYSTOLIC BLOOD PRESSURE: 132 MMHG | OXYGEN SATURATION: 100 %

## 2024-06-11 LAB
ANION GAP SERPL CALC-SCNC: 9 MMOL/L (ref 7–16)
BASOPHILS # BLD AUTO: 1.2 % (ref 0–1.8)
BASOPHILS # BLD: 0.08 K/UL (ref 0–0.12)
BUN SERPL-MCNC: 18 MG/DL (ref 8–22)
CALCIUM SERPL-MCNC: 8.4 MG/DL (ref 8.5–10.5)
CHLORIDE SERPL-SCNC: 101 MMOL/L (ref 96–112)
CO2 SERPL-SCNC: 22 MMOL/L (ref 20–33)
CREAT SERPL-MCNC: 1.02 MG/DL (ref 0.5–1.4)
EOSINOPHIL # BLD AUTO: 0.52 K/UL (ref 0–0.51)
EOSINOPHIL NFR BLD: 7.9 % (ref 0–6.9)
ERYTHROCYTE [DISTWIDTH] IN BLOOD BY AUTOMATED COUNT: 48.7 FL (ref 35.9–50)
GFR SERPLBLD CREATININE-BSD FMLA CKD-EPI: 88 ML/MIN/1.73 M 2
GLUCOSE SERPL-MCNC: 95 MG/DL (ref 65–99)
HCT VFR BLD AUTO: 39.4 % (ref 42–52)
HGB BLD-MCNC: 13.1 G/DL (ref 14–18)
IMM GRANULOCYTES # BLD AUTO: 0.04 K/UL (ref 0–0.11)
IMM GRANULOCYTES NFR BLD AUTO: 0.6 % (ref 0–0.9)
LYMPHOCYTES # BLD AUTO: 0.92 K/UL (ref 1–4.8)
LYMPHOCYTES NFR BLD: 13.9 % (ref 22–41)
MAGNESIUM SERPL-MCNC: 1.9 MG/DL (ref 1.5–2.5)
MCH RBC QN AUTO: 32.3 PG (ref 27–33)
MCHC RBC AUTO-ENTMCNC: 33.2 G/DL (ref 32.3–36.5)
MCV RBC AUTO: 97 FL (ref 81.4–97.8)
MONOCYTES # BLD AUTO: 0.88 K/UL (ref 0–0.85)
MONOCYTES NFR BLD AUTO: 13.3 % (ref 0–13.4)
NEUTROPHILS # BLD AUTO: 4.17 K/UL (ref 1.82–7.42)
NEUTROPHILS NFR BLD: 63.1 % (ref 44–72)
NRBC # BLD AUTO: 0 K/UL
NRBC BLD-RTO: 0 /100 WBC (ref 0–0.2)
PHOSPHATE SERPL-MCNC: 3.3 MG/DL (ref 2.5–4.5)
PLATELET # BLD AUTO: 236 K/UL (ref 164–446)
PMV BLD AUTO: 9.4 FL (ref 9–12.9)
POTASSIUM SERPL-SCNC: 4.5 MMOL/L (ref 3.6–5.5)
RBC # BLD AUTO: 4.06 M/UL (ref 4.7–6.1)
SODIUM SERPL-SCNC: 132 MMOL/L (ref 135–145)
TEST NAME 95000: NORMAL
WBC # BLD AUTO: 6.6 K/UL (ref 4.8–10.8)

## 2024-06-11 PROCEDURE — A9270 NON-COVERED ITEM OR SERVICE: HCPCS | Performed by: SURGERY

## 2024-06-11 PROCEDURE — 80048 BASIC METABOLIC PNL TOTAL CA: CPT

## 2024-06-11 PROCEDURE — 700102 HCHG RX REV CODE 250 W/ 637 OVERRIDE(OP): Performed by: SURGERY

## 2024-06-11 PROCEDURE — 700111 HCHG RX REV CODE 636 W/ 250 OVERRIDE (IP): Performed by: SURGERY

## 2024-06-11 PROCEDURE — A9270 NON-COVERED ITEM OR SERVICE: HCPCS | Performed by: INTERNAL MEDICINE

## 2024-06-11 PROCEDURE — 99239 HOSP IP/OBS DSCHRG MGMT >30: CPT | Performed by: INTERNAL MEDICINE

## 2024-06-11 PROCEDURE — 83735 ASSAY OF MAGNESIUM: CPT

## 2024-06-11 PROCEDURE — 700102 HCHG RX REV CODE 250 W/ 637 OVERRIDE(OP): Performed by: HOSPITALIST

## 2024-06-11 PROCEDURE — 700102 HCHG RX REV CODE 250 W/ 637 OVERRIDE(OP): Performed by: INTERNAL MEDICINE

## 2024-06-11 PROCEDURE — 85025 COMPLETE CBC W/AUTO DIFF WBC: CPT

## 2024-06-11 PROCEDURE — 84100 ASSAY OF PHOSPHORUS: CPT

## 2024-06-11 PROCEDURE — A9270 NON-COVERED ITEM OR SERVICE: HCPCS | Performed by: HOSPITALIST

## 2024-06-11 RX ADMIN — LOSARTAN POTASSIUM 100 MG: 50 TABLET, FILM COATED ORAL at 04:55

## 2024-06-11 RX ADMIN — AMLODIPINE BESYLATE 10 MG: 10 TABLET ORAL at 04:55

## 2024-06-11 RX ADMIN — METHOCARBAMOL TABLETS 500 MG: 500 TABLET, COATED ORAL at 08:38

## 2024-06-11 RX ADMIN — OXYCODONE HYDROCHLORIDE 10 MG: 10 TABLET ORAL at 05:43

## 2024-06-11 RX ADMIN — HEPARIN SODIUM 5000 UNITS: 5000 INJECTION, SOLUTION INTRAVENOUS; SUBCUTANEOUS at 04:57

## 2024-06-11 RX ADMIN — FUROSEMIDE 40 MG: 40 TABLET ORAL at 04:55

## 2024-06-11 RX ADMIN — CARVEDILOL 25 MG: 25 TABLET, FILM COATED ORAL at 08:38

## 2024-06-11 RX ADMIN — FLUTICASONE FUROATE, UMECLIDINIUM BROMIDE AND VILANTEROL TRIFENATATE 1 PUFF: 100; 62.5; 25 POWDER RESPIRATORY (INHALATION) at 04:54

## 2024-06-11 RX ADMIN — GABAPENTIN 100 MG: 100 CAPSULE ORAL at 04:55

## 2024-06-11 RX ADMIN — AMOXICILLIN AND CLAVULANATE POTASSIUM 1 TABLET: 875; 125 TABLET, FILM COATED ORAL at 04:55

## 2024-06-11 RX ADMIN — LEVOTHYROXINE SODIUM 100 MCG: 0.1 TABLET ORAL at 04:55

## 2024-06-11 RX ADMIN — FAMOTIDINE 20 MG: 20 TABLET, FILM COATED ORAL at 04:55

## 2024-06-11 ASSESSMENT — PAIN DESCRIPTION - PAIN TYPE
TYPE: ACUTE PAIN

## 2024-06-11 NOTE — PROGRESS NOTES
Pt discharged from unit, transported to facility by [facility]. Wound vac clamped. Home medications returned. All personal belongings with pt, no belongings left in room.

## 2024-06-11 NOTE — CARE PLAN
The patient is Stable - Low risk of patient condition declining or worsening    Shift Goals  Clinical Goals: Pt's wound vac will be in place and in working order this shift, Pt's infection will remain the same or get better this shift  Patient Goals: pain control  Family Goals: STEVE    Progress made toward(s) clinical / shift goals:  Pt's wound-vac was functional and the dressing remained clean and dry this shift. Pt 's pain has been controlled with monitoring and medication per MAR this shift.Utilized proper infection control practices including hand hygiene, PPE, and a clean environment this shift to assist with infection control. Pt's infection did not get worse this shift.    Problem: Pain - Standard  Goal: Alleviation of pain or a reduction in pain to the patient’s comfort goal  Outcome: Progressing     Problem: Fall Risk  Goal: Patient will remain free from falls  Outcome: Progressing     Patient is not progressing towards the following goals:

## 2024-06-11 NOTE — CARE PLAN
The patient is Stable - Low risk of patient condition declining or worsening    Shift Goals  Clinical Goals: Pt will be discharged, report pain < 5 after PRN med admin, HF education  Patient Goals: discharge  Family Goals: STEVE    Progress made toward(s) clinical / shift goals:    Pt to be discharged at 0900 to SNF with transportation via [facility].  Pt reported pain < 5 after PRN administration of pain medication.  HF packet and education provided to patient along with discharge instructions.      Problem: Care Map:  Day of Discharge Optimal Outcome for the Heart Failure Patient  Goal: Day of Discharge:  Optimal Care of the heart failure patient  Outcome: Progressing     Problem: Knowledge Deficit - Standard  Goal: Patient and family/care givers will demonstrate understanding of plan of care, disease process/condition, diagnostic tests and medications  Outcome: Progressing     Problem: Fall Risk  Goal: Patient will remain free from falls  Outcome: Progressing     Problem: Pain - Standard  Goal: Alleviation of pain or a reduction in pain to the patient’s comfort goal  Outcome: Progressing       Patient is not progressing towards the following goals:

## 2024-06-11 NOTE — DISCHARGE SUMMARY
Discharge Summary    CHIEF COMPLAINT ON ADMISSION  Chief Complaint   Patient presents with    Chest Pain     Initial complaint.  Currently 0/10.     Shortness of Breath    Dizziness    Groin Pain     With testicular edema.    Wound Infection     Possible, at surgical site supra pubic region.         Reason for Admission  ems     Admission Date  5/30/2024    CODE STATUS  Full Code    HPI & HOSPITAL COURSE  Rene Kelley is a 53 y.o. male with morbid obesity, HTN, smoker, CALOS, aortic dissection s/p aortic stent graft, right exernal iliac stent placement and left common iliac. He had a left to right femoral-femoral artery bypass graft.  He was admitted with low grade fever and leg/scrotal swelling admitted 5/30/2024 with a concern of surgical groin wound site drainage/infection.  Vascular surgeon and ID were consulted.     Status post removal of infected femoral-femoral bypass and placement of wound VAC on 5/31.  Or cultures grew Corynebacterium.  MRSA nares negative.  ID recommended Augmentin and vancomycin for 6 weeks.    Per ID note  --- Continue vancomycin and will stop Zosyn, recommend 6 weeks antibiotic course and will continue vancomycin - potentially will do 4 weeks of IV with either vancomycin or daptomycin (end 6/28/24) and then transition to po linezolid 600 mg bid for the final 2 weeks (end 7/12/24)     --- The patient is very deep bilateral groin wounds and has had recent extensive vascular surgery and stenting.  He is at risk for superinfection of the area which could possibly extend into the stenting - due to this will start Augmentin 875/125 twice daily as a preventative measure and continue for a 6-week course, end 7/12/24     Patient did have difficult to control hypertension requiring losartan in addition to his home amlodipine, and carvedilol.  Blood pressure regimen was actively being titrated during admission.  The patient also had some nausea, vomiting, heartburn which was controlled  symptomatically.  PT/OT and recommended postacute placement for wound vacs and antibiotic course.    Therefore, he is discharged in fair and stable condition to skilled nursing facility.    The patient met 2-midnight criteria for an inpatient stay at the time of discharge.    Discharge Date  6/10/2024    FOLLOW UP ITEMS POST DISCHARGE  None    DISCHARGE DIAGNOSES  Principal Problem:    Wound infection (POA: Yes)  Active Problems:    Hypertension (POA: Yes)    Dissection of aorta (HCC) (POA: Yes)    Morbid obesity due to excess calories (HCC) (POA: Yes)    Acquired hypothyroidism (POA: Yes)    Tobacco abuse (POA: Yes)    Acute respiratory failure with hypoxia (HCC) (POA: Yes)    Acute diastolic heart failure (HCC) (POA: Yes)  Resolved Problems:    * No resolved hospital problems. *      FOLLOW UP  [facility]  [address] [address]  # [phone]  Go on 6/17/2024  Arrive at 11:00am for ongoing heart failure treatment. This is a walk-in clinic that has times between 11:00am-12:00pm or 2:00pm-4:00pm.    Patients are seen on a first come, first served basis, wait times may vary. This clinic offers a sliding-fee scale.    [address] [address]  [phone]          MEDICATIONS ON DISCHARGE     Medication List        ASK your doctor about these medications        Instructions   amLODIPine 10 MG Tabs  Commonly known as: Norvasc   Take 1 Tablet by mouth every day.  Dose: 10 mg     carvedilol 12.5 MG Tabs  Commonly known as: Coreg   Take 1 Tablet by mouth 2 times a day with meals.  Dose: 12.5 mg     famotidine 20 MG Tabs  Commonly known as: Pepcid   Take 1 Tablet by mouth every day.  Dose: 20 mg     ferrous sulfate 325 (65 Fe) MG tablet   Take 1 Tablet by mouth every morning with breakfast.  Dose: 325 mg     gabapentin 100 MG Caps  Commonly known as: Neurontin   Take 1 Capsule by mouth 3 times a day.  Dose: 100 mg     levothyroxine 100 MCG  Tabs  Commonly known as: Synthroid   Take 1 Tablet by mouth every morning on an empty stomach.  Dose: 100 mcg     methocarbamol 500 MG Tabs  Commonly known as: Robaxin   Take 1 Tablet by mouth 4 times a day.  Dose: 500 mg     nicotine 21 MG/24HR Pt24  Commonly known as: Nicoderm   Place 1 Patch on the skin every 24 hours.  Dose: 1 Patch     oxyCODONE immediate-release 5 MG Tabs  Commonly known as: Roxicodone  Ask about: Should I take this medication?   Take 1 Tablet by mouth every 6 hours as needed for Severe Pain for up to 5 days.  Dose: 5 mg     polyethylene glycol/lytes Pack  Commonly known as: Miralax   Take 1 Packet by mouth every day.  Dose: 17 g     senna-docusate 8.6-50 MG Tabs  Commonly known as: Pericolace Or Senokot S   Take 2 Tablets by mouth 2 times a day.  Dose: 2 Tablet     Trelegy Ellipta 100-62.5-25 MCG/ACT inhaler  Generic drug: fluticasone-umeclidinium-vilanterol   Inhale 1 Puff by mouth every day.  Dose: 1 Puff              Allergies  No Known Allergies    DIET  Orders Placed This Encounter   Procedures    Diet Order Diet: 2 Gram Sodium     Standing Status:   Standing     Number of Occurrences:   1     Order Specific Question:   Diet:     Answer:   2 Gram Sodium [7]       ACTIVITY  As tolerated.  Weight bearing as tolerated    CONSULTATIONS  Vasc    PROCEDURES  Status post removal of infected femoral-femoral bypass and placement of wound VAC on 5/31     LABORATORY  Lab Results   Component Value Date    SODIUM 137 06/10/2024    POTASSIUM 4.3 06/10/2024    CHLORIDE 104 06/10/2024    CO2 21 06/10/2024    GLUCOSE 107 (H) 06/10/2024    BUN 16 06/10/2024    CREATININE 1.08 06/10/2024        Lab Results   Component Value Date    WBC 7.3 06/10/2024    HEMOGLOBIN 12.6 (L) 06/10/2024    HEMATOCRIT 37.7 (L) 06/10/2024    PLATELETCT 257 06/10/2024        Total time of the discharge process exceeds 30 minutes.

## 2024-06-11 NOTE — PROGRESS NOTES
Assumed care of patient at 1900 from day RN. Patient is A&O x 4. Bed locked in the lowest position, 3 side rails up, call light is within reach, belongings at bedside. Pt reports pain level of 5 /10 Declines intervention at this time. Mobility upself. Wound vac to groin in place. Explained plan of care and safety precautions to pt, pt has no questions at this time. Hourly rounding is in place.

## 2024-06-11 NOTE — HOSPITAL COURSE
Rene Kelley is a 53 y.o. male with morbid obesity, HTN, smoker, CALOS, aortic dissection s/p aortic stent graft, right exernal iliac stent placement and left common iliac. He had a left to right femoral-femoral artery bypass graft.  He was admitted with low grade fever and leg/scrotal swelling admitted 5/30/2024 with a concern of surgical groin wound site drainage/infection.  Vascular surgeon and ID were consulted.     Status post removal of infected femoral-femoral bypass and placement of wound VAC on 5/31.  Or cultures grew Corynebacterium.  MRSA nares negative.  ID recommended Augmentin and vancomycin for 6 weeks.    Per ID note  --- Continue vancomycin and will stop Zosyn, recommend 6 weeks antibiotic course and will continue vancomycin - potentially will do 4 weeks of IV with either vancomycin or daptomycin (end 6/28/24) and then transition to po linezolid 600 mg bid for the final 2 weeks (end 7/12/24)     --- The patient is very deep bilateral groin wounds and has had recent extensive vascular surgery and stenting.  He is at risk for superinfection of the area which could possibly extend into the stenting - due to this will start Augmentin 875/125 twice daily as a preventative measure and continue for a 6-week course, end 7/12/24     Patient did have difficult to control hypertension requiring losartan in addition to his home amlodipine, and carvedilol.  Blood pressure regimen was actively being titrated during admission.  The patient also had some nausea, vomiting, heartburn which was controlled symptomatically.  PT/OT and recommended postacute placement for wound vacs and antibiotic course.

## 2024-06-11 NOTE — PROGRESS NOTES
0800: Assumed care of patient at 0700. Received report from night shift RN. Pt is A&O x 4, on RA. Reporting a pain level of 3/10, denies any interventions at this time. Assessment completed and VSS. Bowel sounds are active in all 4 quadrants. Lung sounds are diminished in bases. Pt ambulated to nurses station and back with steady gait. Pt is to discharge to SNF with [facility] at 0900, pt educated and HF packet given. Home medications at bedside. Bed is in lowest position and call light is within reach. Fall precautions are in place.

## 2024-06-11 NOTE — DISCHARGE SUMMARY
Discharge Summary    CHIEF COMPLAINT ON ADMISSION  Chief Complaint   Patient presents with    Chest Pain     Initial complaint.  Currently 0/10.     Shortness of Breath    Dizziness    Groin Pain     With testicular edema.    Wound Infection     Possible, at surgical site supra pubic region.         Reason for Admission  ems     Admission Date  5/30/2024    CODE STATUS  Full Code    HPI & HOSPITAL COURSE  Please review Dr. Jonas Wall M.D. notes for further details of history of present illness, past medical/social/family histories, allergies and medications. Please review Dr. Fernandes, Vascular, Dr. Moss, ID consultation notes.  53 year old morbidly obese male who smokes cigarettes and current methamphetamine use, with a history of HTN, CALOS, aortic dissection s/p aortic stent graft, right exernal iliac stent placement and left common iliac. He had a left to right femoral-femoral artery bypass graft. He was admitted with low grade fever and leg/scrotal swelling admitted 5/30/2024 with a concern of surgical groin wound site drainage/infection. At the ED, afebrile, hemodynamically stable. Mild leukocytosis.  CTA:  1.  Type B aortic dissection status post thoracic stent graft. Dissection flap is unchanged extending from just beyond the left subclavian artery origin into the bilateral external iliac arteries.   2.  Proximal descending thoracic aortic aneurysm measuring 4.5 x 4.5 cm.   3.  Status post bilateral common iliac artery stent placement and femorofemoral bypass. Stents and distal femoral arteries appear patent.   4.  Mild stranding and multiple foci of gas in the lower anterior abdominal wall without focal drainable fluid collection just chest abscess.   5.  Redemonstrated extension of the abdominal aortic dissection into the celiac artery which supplied by both the true and false lumen. Distal vessels appear patent.   6.  Diffuse bilateral tree-in-bud opacities which could be seen in setting edema and/or  infection.   7.  15 mm lingular pulmonary nodule.   8.  Trace left pleural effusion.   9.  Nonobstructive bilateral renal calculi.   Wound cultures reviewed, Corynebacterium amycolatum was the predominant growth. S/p removal of infected femoral-femoral bypass and placement of wound VAC by Dr. Fernandes on 5/31. ID recommended PO Augmentin STOP 7/12, along with IV vancomycin or daptomycin STOP 6/28 followed by PO linezolid for 2 weeks STOP 7/12.     Blood pressure improved with titration of his blood pressure medications.     PT/OT recommend skilled. He will transfer to a SNF.     At discharge date, Rene Kelley afebrile and hemodynamically stable.  Rene Kelley wanted to be discharged today.      Imaging  IR-PICC LINE PLACEMENT W/ GUIDANCE > AGE 5   Final Result                  Ultrasound-guided PICC placement performed by qualified nursing staff as    above.          EC-ECHOCARDIOGRAM COMPLETE W/ CONT   Final Result      EC-ECHOCARDIOGRAM COMPLETE W/O CONT         DX-CHEST-PORTABLE (1 VIEW)   Final Result      1.  Trace LEFT pleural effusion   2.  Decreased LEFT peripheral pulmonary opacity which could be atelectasis or pneumonia      CT-CTA COMPLETE THORACOABDOMINAL AORTA   Final Result      1.  Type B aortic dissection status post thoracic stent graft. Dissection flap is unchanged extending from just beyond the left subclavian artery origin into the bilateral external iliac arteries.   2.  Proximal descending thoracic aortic aneurysm measuring 4.5 x 4.5 cm.   3.  Status post bilateral common iliac artery stent placement and femorofemoral bypass. Stents and distal femoral arteries appear patent.   4.  Mild stranding and multiple foci of gas in the lower anterior abdominal wall without focal drainable fluid collection just chest abscess.   5.  Redemonstrated extension of the abdominal aortic dissection into the celiac artery which supplied by both the true and false lumen. Distal vessels appear  patent.   6.  Diffuse bilateral tree-in-bud opacities which could be seen in setting edema and/or infection.   7.  15 mm lingular pulmonary nodule.   8.  Trace left pleural effusion.   9.  Nonobstructive bilateral renal calculi.      [place] Type B aortic dissection      Fleischner Society pulmonary nodule recommendations:   Low and High Risk: Consider CT at 3 months, PET/CT, or tissue sampling.      Low Risk - Minimal or absent history of smoking and of other known risk factors.      High Risk - History of smoking or of other known risk factors.      Note: These recommendations do not apply to lung cancer screening, patients with immunosuppression, or patients with known primary cancer.      Fleischner Society 2017 Guidelines for Management of Incidentally Detected Pulmonary Nodules in Adults               IR-ABDOMINAL AORTA-WITH RUNOFF    (Results Pending)           Therefore, he is discharged in fair and stable condition to skilled nursing facility.    The patient met 2-midnight criteria for an inpatient stay at the time of discharge.    Discharge Date  6/11/2024    FOLLOW UP ITEMS POST DISCHARGE  None    DISCHARGE DIAGNOSES  Principal Problem:    Wound infection (POA: Yes)  Active Problems:    Hypertension (POA: Yes)    Dissection of aorta (HCC) (POA: Yes)    Morbid obesity due to excess calories (HCC) (POA: Yes)    Acquired hypothyroidism (POA: Yes)    Tobacco abuse (POA: Yes)    Acute respiratory failure with hypoxia (HCC) (POA: Yes)    Acute diastolic heart failure (HCC) (POA: Yes)  Resolved Problems:    * No resolved hospital problems. *      FOLLOW UP  [facility]  [address] [place] [id number]  # [phone]  Go on 6/17/2024  Arrive at 11:00am for ongoing heart failure treatment. This is a walk-in clinic that has times between 11:00am-12:00pm or 2:00pm-4:00pm.    Patients are seen on a first come, first served basis, wait times may vary. This clinic offers a sliding-fee  scale.    [facility]  [address] [place] [address]  [phone]      Follow up with attending at facility upon receipt  Follow up with Dr. Fernandes in 1 week, postop visit  Follow up in ID clinic in 1-2 weeks  NURSING provide resources/pamphlets for  Postoperative instructions from Dr. Fernandes, antibiotic use, narcotic use (Avoid swimming, driving or operating machinery. Treat constipation with prescribed bowel regimen), wound and wound vac care, hypertension (Check and record blood pressures at home at least twice a day for primary provider to review), smoking (NO MORE SMOKING), illicit drug use (NO MORE METH or illicit drugs), obesity (Recommended weight loss advised, 5% through reduced calorie, low carb diet and 150 mins of exercise a week once better  Recommend bariatric surgery evaluation if morbidly obese  Educated on the increase of morbidity and mortality associated with excess weight including DM, Heart Disease, HTN, stroke, and sleep apnea. Recommended outpatient monitoring  of blood sugars, lipid panel, sleep study evaluation for metabolic syndrome).    MEDICATIONS ON DISCHARGE     Medication List        ASK your doctor about these medications        Instructions   amLODIPine 10 MG Tabs  Commonly known as: Norvasc   Take 1 Tablet by mouth every day.  Dose: 10 mg     carvedilol 12.5 MG Tabs  Commonly known as: Coreg   Take 1 Tablet by mouth 2 times a day with meals.  Dose: 12.5 mg     famotidine 20 MG Tabs  Commonly known as: Pepcid   Take 1 Tablet by mouth every day.  Dose: 20 mg     ferrous sulfate 325 (65 Fe) MG tablet   Take 1 Tablet by mouth every morning with breakfast.  Dose: 325 mg     gabapentin 100 MG Caps  Commonly known as: Neurontin   Take 1 Capsule by mouth 3 times a day.  Dose: 100 mg     levothyroxine 100 MCG Tabs  Commonly known as: Synthroid   Take 1 Tablet by mouth every morning on an empty stomach.  Dose: 100 mcg     methocarbamol 500 MG Tabs  Commonly known as:  Robaxin   Take 1 Tablet by mouth 4 times a day.  Dose: 500 mg     nicotine 21 MG/24HR Pt24  Commonly known as: Nicoderm   Place 1 Patch on the skin every 24 hours.  Dose: 1 Patch     oxyCODONE immediate-release 5 MG Tabs  Commonly known as: Roxicodone  Ask about: Should I take this medication?   Take 1 Tablet by mouth every 6 hours as needed for Severe Pain for up to 5 days.  Dose: 5 mg     polyethylene glycol/lytes Pack  Commonly known as: Miralax   Take 1 Packet by mouth every day.  Dose: 17 g     senna-docusate 8.6-50 MG Tabs  Commonly known as: Pericolace Or Senokot S   Take 2 Tablets by mouth 2 times a day.  Dose: 2 Tablet     Trelegy Ellipta 100-62.5-25 MCG/ACT inhaler  Generic drug: fluticasone-umeclidinium-vilanterol   Inhale 1 Puff by mouth every day.  Dose: 1 Puff              Allergies  No Known Allergies    DIET  Orders Placed This Encounter   Procedures    Diet Order Diet: 2 Gram Sodium     Standing Status:   Standing     Number of Occurrences:   1     Order Specific Question:   Diet:     Answer:   2 Gram Sodium [7]       ACTIVITY  As tolerated.  Weight bearing as tolerated    CONSULTATIONS  Vasc    PROCEDURES  Status post removal of infected femoral-femoral bypass and placement of wound VAC on 5/31     LABORATORY  Lab Results   Component Value Date    SODIUM 132 (L) 06/11/2024    POTASSIUM 4.5 06/11/2024    CHLORIDE 101 06/11/2024    CO2 22 06/11/2024    GLUCOSE 95 06/11/2024    BUN 18 06/11/2024    CREATININE 1.02 06/11/2024        Lab Results   Component Value Date    WBC 6.6 06/11/2024    HEMOGLOBIN 13.1 (L) 06/11/2024    HEMATOCRIT 39.4 (L) 06/11/2024    PLATELETCT 236 06/11/2024        Total time of the discharge process exceeds 35 minutes.

## 2024-06-11 NOTE — DISCHARGE PLANNING
Case Management Discharge Planning    Admission Date: 5/30/2024  GMLOS: 5.4  ALOS: 12    6-Clicks ADL Score: 21  6-Clicks Mobility Score: 20      Anticipated Discharge Dispo: Discharge Disposition: D/T to SNF with Medicare cert in anticipation of skilled care (03)    DME Needed: No    Action(s) Taken: Updated Provider/Nurse on Discharge Plan  0750 Contacted Ligia at [facility] to notify of results of covid test. - Negative - not detected.   Packet prepared.  Escalations Completed: None    Medically Clear: Yes    Next Steps: follow for transfer needs    Barriers to Discharge: None    Is the patient up for discharge tomorrow: No

## 2024-06-12 ENCOUNTER — TELEPHONE (OUTPATIENT)
Dept: INFECTIOUS DISEASES | Facility: MEDICAL CENTER | Age: 54
End: 2024-06-12
Payer: COMMERCIAL

## 2024-06-12 LAB
BACTERIA WND AEROBE CULT: ABNORMAL
BACTERIA WND AEROBE CULT: ABNORMAL
GRAM STN SPEC: ABNORMAL
SIGNIFICANT IND 70042: ABNORMAL
SITE SITE: ABNORMAL
SOURCE SOURCE: ABNORMAL

## 2024-06-12 NOTE — TELEPHONE ENCOUNTER
[facility] to schedule [facility]. Patient is hospital follow up however has pending medicaid and [facility]. Need VA Auth to be sent in prior to scheduling follow up, since [facility] can refer [facility] to Dr Jimenez, just need to confirm [facility] will approve visit. [facility] will call back once approval is in place

## 2024-06-18 ENCOUNTER — OFFICE VISIT (OUTPATIENT)
Dept: VASCULAR SURGERY | Facility: MEDICAL CENTER | Age: 54
End: 2024-06-18
Payer: COMMERCIAL

## 2024-06-18 VITALS
SYSTOLIC BLOOD PRESSURE: 144 MMHG | DIASTOLIC BLOOD PRESSURE: 62 MMHG | WEIGHT: 309 LBS | HEIGHT: 71 IN | OXYGEN SATURATION: 95 % | HEART RATE: 77 BPM | TEMPERATURE: 97.6 F | BODY MASS INDEX: 43.26 KG/M2

## 2024-06-18 DIAGNOSIS — T82.7XXD INFECTION OF VASCULAR BYPASS GRAFT, SUBSEQUENT ENCOUNTER: ICD-10-CM

## 2024-06-18 DIAGNOSIS — I71.03 DISSECTION OF THORACOABDOMINAL AORTA (HCC): ICD-10-CM

## 2024-06-18 PROCEDURE — 3077F SYST BP >= 140 MM HG: CPT | Performed by: SURGERY

## 2024-06-18 PROCEDURE — 99024 POSTOP FOLLOW-UP VISIT: CPT | Performed by: SURGERY

## 2024-06-18 PROCEDURE — 3078F DIAST BP <80 MM HG: CPT | Performed by: SURGERY

## 2024-06-18 ASSESSMENT — FIBROSIS 4 INDEX: FIB4 SCORE: 1.25

## 2024-06-18 NOTE — PROGRESS NOTES
"                 VASCULAR SURGERY                    Postop Note  _________________________________    6/18/2024    Mr. Kelley comes to the clinic from [facility] for follow-up, status post stent graft repair of [place] type B dissection and femoral-femoral bypass by Dr. Gagandeep Arnett on May 21, 2024, status post removal of infected femoral-femoral bypass, bilateral iliac stenting, primary closure of left common femoral artery, vein patch angioplasty of right common femoral artery and bilateral sartorius flaps by Dr. Alcantara on May 31, 2024.    On follow-up today, patient has no complaints.  He denies chest or back pain.    Vitals  BP (!) 144/62 (BP Location: Right arm, Patient Position: Sitting, BP Cuff Size: Adult long) Comment (BP Location): forearm  Pulse 77   Temp 36.4 °C (97.6 °F) (Temporal)   Ht 1.803 m (5' 11\")   Wt (!) 140 kg (309 lb)   SpO2 95%   BMI 43.10 kg/m²     Exam  General: Morbidly obese male in nonapparent distress.  Lower extremities: Wound VAC in place.  Multiphasic Doppler flow signals over bilateral pedal arteries.    Assessment: Postop.    Plan: Doing well.  Patient showed me photos of the wounds on his phone.  The wounds appear to be clean and healing well.  Continue wound VAC.  Follow-up in clinic in 3 weeks for wound check.  Patient knows to call the office for any problems at any time.  I answered all of his questions.      Alex Ahmadi MD  [facility]  [phone] [address], [place] [address]    "

## 2024-07-30 ENCOUNTER — TELEPHONE (OUTPATIENT)
Dept: VASCULAR SURGERY | Facility: MEDICAL CENTER | Age: 54
End: 2024-07-30
Payer: COMMERCIAL

## 2024-08-08 ENCOUNTER — TELEPHONE (OUTPATIENT)
Dept: VASCULAR SURGERY | Facility: MEDICAL CENTER | Age: 54
End: 2024-08-08
Payer: COMMERCIAL

## 2024-08-21 ENCOUNTER — DOCUMENTATION (OUTPATIENT)
Dept: VASCULAR LAB | Facility: MEDICAL CENTER | Age: 54
End: 2024-08-21
Payer: COMMERCIAL

## 2024-08-21 NOTE — PROGRESS NOTES
Case has been discussed with vascular surgery.  They have tried on multiple occasions to reach patient to arrange f/u but have been unsuccessful.   Await further patient contact.    Michael Bloch, MD  [facility]

## 2024-12-31 ENCOUNTER — TELEPHONE (OUTPATIENT)
Dept: HEALTH INFORMATION MANAGEMENT | Facility: OTHER | Age: 54
End: 2024-12-31
Payer: COMMERCIAL

## (undated) DEVICE — GUIDEWIRES STARTER (PTFE COATED) J CURVED FIXED CORE 0.035 180CM 10 3 MM J FS

## (undated) DEVICE — TUBE E-T HI-LO CUFF 8.0MM (10EA/PK)

## (undated) DEVICE — Device

## (undated) DEVICE — SODIUM CHL IRRIGATION 0.9% 1000ML (12EA/CA)

## (undated) DEVICE — KIT ANESTHESIA W/CIRCUIT & 3/LT BAG W/FILTER (20EA/CA)

## (undated) DEVICE — CHLORAPREP 26 ML APPLICATOR - ORANGE TINT(25/CA)

## (undated) DEVICE — DRAPE LARGE 3 QUARTER - (20/CA)

## (undated) DEVICE — GOWN SURGEONS X-LARGE - DISP. (30/CA)

## (undated) DEVICE — SHEATH RO 6F 25CM (10EA/BX)

## (undated) DEVICE — KIT INTROPERCUTANEOUS SHEATH - 8.5 FR W/MAX BARRIER AND BIOPATCH  (5/CA)

## (undated) DEVICE — GLOVE BIOGEL SZ 8 SURGICAL PF LTX - (50PR/BX 4BX/CA)

## (undated) DEVICE — SENSOR OXIMETER ADULT SPO2 RD SET (20EA/BX)

## (undated) DEVICE — PAD PREP 24 X 48 CUFFED - (100/CA)

## (undated) DEVICE — SUTURE 0 VICRYL PLUS CT-1 - 36 INCH (36/BX)

## (undated) DEVICE — SHEATH RO 8F 25CM (10EA/BX)

## (undated) DEVICE — SET EXTENSION WITH 2 PORTS (48EA/CA) ***PART #2C8610 IS A SUBSTITUTE*****

## (undated) DEVICE — DEVICE INFLATION DIGITAL BLUE DIAMOND (5EA/BX)

## (undated) DEVICE — DECANTER FLD BLS - (50/CA)

## (undated) DEVICE — DRAPE SURGICAL U 77X120 - (10/CA)

## (undated) DEVICE — SODIUM CHL. INJ. 0.9% 500ML (24EA/CA 50CA/PF)

## (undated) DEVICE — GLOVE BIOGEL PI ORTHO SZ 6 SURGICAL PF LF (40PR/BX)

## (undated) DEVICE — TRAY MULTI-LUMEN 7FR PRESSURE W/MAX BARRIER AND BIOPATCH - (5/CA)

## (undated) DEVICE — STOPCOCK 3-WAY W/SWIVEL LEVER LOCK (50EA/CA)

## (undated) DEVICE — SOD. CHL. INJ. 0.9% 1000 ML - (14EA/CA 60CA/PF)

## (undated) DEVICE — FILTER BLOOD TRANSFUSION - (40/CA) (PALL)

## (undated) DEVICE — CATHETER ANGLED KUMPE 4F 40CM .035 (5EA/BX)"

## (undated) DEVICE — GUIDEWIRE STARTER J CURVED FIXED CORE .035 260CM 10 3MM PTFE/HEPARIN COATED (5EA/BX)

## (undated) DEVICE — SUTURE 3-0 VICRYL PLUS SH - 8X 18 INCH (12/BX)

## (undated) DEVICE — DRAPE IOBAN II 23 IN X 33 IN - (10/BX)

## (undated) DEVICE — SUTURE 4-0 30CM STRATAFIX SPIRAL PS-2 (12EA/BX)

## (undated) DEVICE — SLEEVE, VASO, THIGH, MED

## (undated) DEVICE — GLOVE BIOGEL SZ 6 PF LATEX - (50EA/BX 4BX/CA)

## (undated) DEVICE — SUCTION INSTRUMENT YANKAUER BULBOUS TIP W/O VENT (50EA/CA)

## (undated) DEVICE — NEEDLE THINWALL 19GA X 7CM

## (undated) DEVICE — COVER LIGHT HANDLE ALC PLUS DISP (18EA/BX)

## (undated) DEVICE — BOVIE BLADE COATED - (50/PK)

## (undated) DEVICE — TUBE CONNECT SUCTION CLEAR 120 X 1/4" (50EA/CA)"

## (undated) DEVICE — TUBING CLEARLINK DUO-VENT - C-FLO (48EA/CA)

## (undated) DEVICE — GOWN WARMING STANDARD FLEX - (30/CA)

## (undated) DEVICE — SUTURE CV

## (undated) DEVICE — CLIP MED INTNL HRZN TI ESCP - (25/BX)

## (undated) DEVICE — SHEATH RO 4F 10CM (10EA/BX)

## (undated) DEVICE — LACTATED RINGERS INJ 1000 ML - (14EA/CA 60CA/PF)

## (undated) DEVICE — KIT RADIAL ARTERY 20GA W/MAX BARRIER AND BIOPATCH (5EA/CA) #10740 IS FOR THE SET RADIAL ARTERIAL

## (undated) DEVICE — CLIP SM INTNL HRZN TI ESCP LGT - (24EA/PK 25PK/BX)

## (undated) DEVICE — SUTURE 5-0 PROLENE BLUE C-1 HS 1 X 30 (36EA/BX)"

## (undated) DEVICE — GLOVESZ 8.5 BIOGEL PI MICRO - PF LF (50PR/BX)

## (undated) DEVICE — SUTURE 2-0 VICRYL PLUS CT-1 36 (36PK/BX)"

## (undated) DEVICE — DRAPE MAYO STAND - (30/CA)

## (undated) DEVICE — WIRE GUIDE LUNDERQUIST EXTRA STIFF DC

## (undated) DEVICE — SHEATH

## (undated) DEVICE — COVER LIGHT HANDLE FLEXIBLE - SOFT (2EA/PK 80PK/CA)

## (undated) DEVICE — SHEET CARDIOVASCULAR - (4/CA)

## (undated) DEVICE — VESSELOOP MAXI BLUE STERILE- SURG-I-LOOP (10EA/BX)

## (undated) DEVICE — TRANSDUCER ADULT DISP. SINGLE BONDED STERILE - (10EA/CA)

## (undated) DEVICE — CANISTER SUCTION 3000ML MECHANICAL FILTER AUTO SHUTOFF MEDI-VAC NONSTERILE LF DISP  (40EA/CA)

## (undated) DEVICE — SHEATH RO 6F 10CM (10EA/BX)

## (undated) DEVICE — GUIDEWIRE 1.5MM J TIP GLIDEWIRE 180 CM BABY J

## (undated) DEVICE — SET INTRO MIRCROPUNCTURE - MPIS-501-SST

## (undated) DEVICE — GLOVE BIOGEL PI INDICATOR SZ 8.0 SURGICAL PF LF -(50/BX 4BX/CA)

## (undated) DEVICE — BLADE SURGICAL #11 - (50/BX)

## (undated) DEVICE — SPONGE RADIOPAQUE CTN X-LG - STERILE (50PK/CA) MADE TO ORDER ITEM AND HAS A 4-6 WEEK LEAD TIME

## (undated) DEVICE — DRAPE IOBAN II INCISE 23X17 - (10EA/BX 4BX/CA)

## (undated) DEVICE — CATHETER ANGIO OMNI FLUSH 4FR 65CM - (10/BX)

## (undated) DEVICE — CLIP LG INTNL HRZN TI ESCP LGT - (20/BX)

## (undated) DEVICE — CATHETER ULTRASOUND VISION PV .035

## (undated) DEVICE — CLIP MED LG INTNL HRZN TI ESCP - (20/BX)

## (undated) DEVICE — SET LEADWIRE 5 LEAD BEDSIDE DISPOSABLE ECG (1SET OF 5/EA)

## (undated) DEVICE — DRESSING KIT V.A.C. SENSA T.R.A.C. LARGE (10EA/CA)

## (undated) DEVICE — ELECTRODE DUAL RETURN W/ CORD - (50/PK)

## (undated) DEVICE — SYRINGE 30 ML LL (56/BX)

## (undated) DEVICE — GLIDECATH ANGLE 4F X 70CM (5EA/BX)

## (undated) DEVICE — PACK AV FISTULA (2EA/CA)

## (undated) DEVICE — SUTURE GENERAL

## (undated) DEVICE — BAG RESUSCITATION DISPOSABLE - WITH MASK (10 EA/CA)

## (undated) DEVICE — CATHETER 4FR OMNI FRESH 20CM SIZING

## (undated) DEVICE — PAD LAP STERILE 18 X 18 - (5/PK 40PK/CA)

## (undated) DEVICE — SHEATH RO 7F 10CM

## (undated) DEVICE — KIT SURGIFLO W/OUT THROMBIN - (6EA/CA)

## (undated) DEVICE — TRANSDUCER ADULT DISP. SINGLE BONDED STERILE - (20EA/CA)

## (undated) DEVICE — CANISTER SUCTION 3000ML MECHANICAL FILTER AUTO SHUTOFF MEDI-VAC NONSTERILE LF DISP (40EA/CA)

## (undated) DEVICE — VESSELOOP MINI BLUE STERILE - SURG-I-LOOP (10EA/BX)

## (undated) DEVICE — SUTURE 4-0 PROLENE RB-1 D/A 36 (36PK/BX)"

## (undated) DEVICE — BALLOON 7.0X60 75 CM MUSTANG

## (undated) DEVICE — GLOVE BIOGEL SZ 7 SURGICAL PF LTX - (50PR/BX 4BX/CA)

## (undated) DEVICE — VAC CANISTER W/GEL 500ML - FITS NEW MACHINES (10EA/CA)

## (undated) DEVICE — SUTURE 2-0 SILK 12 X 18" (36PK/BX)"

## (undated) DEVICE — SUCTION TIP STRAIGHT ARGYLE - 50EA/CA

## (undated) DEVICE — PACK ANGIOGRAPHY DRAPE - (2/CA)

## (undated) DEVICE — TOWELS CLOTH SURGICAL - (4/PK 20PK/CA)

## (undated) DEVICE — GUIDEWIRE HYDROPHILIC COATED ANGLED TIP .035 X 260CM (5EA/BX)"

## (undated) DEVICE — GOWN SURGEONS LARGE - (32/CA)

## (undated) DEVICE — SET FLUID WARMING STANDARD FLOW - (10/CA)

## (undated) DEVICE — GLOVE SZ 7.5 BIOGEL PI MICRO - PF LF (50PR/BX)

## (undated) DEVICE — TUBE NG SALEM SUMP 16FR (50EA/CA)

## (undated) DEVICE — DRESSING LEUKOMED STERILE 11.75X4IN - (50/CA)

## (undated) DEVICE — SUTURE 3-0 SILK 12 X 18 IN - (36/BX)

## (undated) DEVICE — KIT SURGIFLO W/OUT THROMBIN - (6EA/BX)

## (undated) DEVICE — STAPLER SKIN DISP - (6/BX 10BX/CA) VISISTAT

## (undated) DEVICE — TRAY SURESTEP FOLEY TEMP SENSING 16FR (10EA/CA) ORDER  #18764 FOR TEMP FOLEY ONLY

## (undated) DEVICE — GLOVE BIOGEL PI INDICATOR SZ 7.5 SURGICAL PF LF -(50/BX 4BX/CA)

## (undated) DEVICE — KIT RADIAL ARTERY 20GA W/MAX BARRIER AND BIOPATCH  (5EA/CA) #10740 IS FOR THE SET RADIAL ARTERIAL

## (undated) DEVICE — BOVIE  BLADE 6 EXTENDED - (50/PK)

## (undated) DEVICE — SPONGE KITTNER DISSECTORS - (5EA/PK 50PK/CA)

## (undated) DEVICE — PENCIL ELECTSURG 10FT BTN SWH - (50/CA)

## (undated) DEVICE — PTFE PLED STER - (250/CA)

## (undated) DEVICE — PACK MAJOR BASIN - (2EA/CA)

## (undated) DEVICE — SHEATH DRYSEAL FLEX INTRODUCER 22FR X 28CM

## (undated) DEVICE — SET BIFURCATED BLOOD - (48EA/CS)